# Patient Record
Sex: MALE | Race: WHITE
[De-identification: names, ages, dates, MRNs, and addresses within clinical notes are randomized per-mention and may not be internally consistent; named-entity substitution may affect disease eponyms.]

---

## 2017-03-17 ENCOUNTER — HOSPITAL ENCOUNTER (EMERGENCY)
Dept: HOSPITAL 75 - ER | Age: 37
Discharge: HOME | End: 2017-03-17
Payer: MEDICARE

## 2017-03-17 VITALS — SYSTOLIC BLOOD PRESSURE: 112 MMHG | DIASTOLIC BLOOD PRESSURE: 69 MMHG

## 2017-03-17 VITALS — HEIGHT: 72 IN | BODY MASS INDEX: 33.05 KG/M2 | WEIGHT: 244 LBS

## 2017-03-17 DIAGNOSIS — R59.0: ICD-10-CM

## 2017-03-17 DIAGNOSIS — R10.31: Primary | ICD-10-CM

## 2017-03-17 DIAGNOSIS — K76.0: ICD-10-CM

## 2017-03-17 DIAGNOSIS — Z87.442: ICD-10-CM

## 2017-03-17 LAB
BASOPHILS # BLD AUTO: 0.1 10^3/UL (ref 0–0.1)
BASOPHILS NFR BLD AUTO: 1 % (ref 0–10)
BILIRUB UR QL STRIP: NEGATIVE
EOSINOPHIL # BLD AUTO: 0.2 10^3/UL (ref 0–0.3)
EOSINOPHIL NFR BLD AUTO: 4 % (ref 0–10)
ERYTHROCYTE [DISTWIDTH] IN BLOOD BY AUTOMATED COUNT: 12.2 % (ref 10–14.5)
KETONES UR QL STRIP: NEGATIVE
LEUKOCYTE ESTERASE UR QL STRIP: (no result)
LYMPHOCYTES # BLD AUTO: 1.6 X 10^3 (ref 1–4)
LYMPHOCYTES NFR BLD AUTO: 26 % (ref 12–44)
MCH RBC QN AUTO: 31 PG (ref 25–34)
MCHC RBC AUTO-ENTMCNC: 34 G/DL (ref 32–36)
MCV RBC AUTO: 92 FL (ref 80–99)
MONOCYTES # BLD AUTO: 0.5 X 10^3 (ref 0–1)
MONOCYTES NFR BLD AUTO: 9 % (ref 0–12)
NEUTROPHILS # BLD AUTO: 3.7 X 10^3 (ref 1.8–7.8)
NEUTROPHILS NFR BLD AUTO: 61 % (ref 42–75)
NITRITE UR QL STRIP: NEGATIVE
PH UR STRIP: 7 [PH] (ref 5–9)
PLATELET # BLD: 277 10^3/UL (ref 130–400)
PMV BLD AUTO: 9.2 FL (ref 7.4–10.4)
PROT UR QL STRIP: NEGATIVE
RBC # BLD AUTO: 5.09 10^6/UL (ref 4.35–5.85)
SP GR UR STRIP: 1.01 (ref 1.02–1.02)
UROBILINOGEN UR-MCNC: NORMAL MG/DL
WBC # BLD AUTO: 6 10^3/UL (ref 4.3–11)
WBC #/AREA URNS HPF: (no result) /HPF

## 2017-03-17 PROCEDURE — 96374 THER/PROPH/DIAG INJ IV PUSH: CPT

## 2017-03-17 PROCEDURE — 74176 CT ABD & PELVIS W/O CONTRAST: CPT

## 2017-03-17 PROCEDURE — 96375 TX/PRO/DX INJ NEW DRUG ADDON: CPT

## 2017-03-17 PROCEDURE — 36415 COLL VENOUS BLD VENIPUNCTURE: CPT

## 2017-03-17 PROCEDURE — 85025 COMPLETE CBC W/AUTO DIFF WBC: CPT

## 2017-03-17 PROCEDURE — 81000 URINALYSIS NONAUTO W/SCOPE: CPT

## 2017-03-17 NOTE — DIAGNOSTIC IMAGING REPORT
PROCEDURE: CT urinary tract, rule out kidney stone.



TECHNIQUE: Multiple contiguous axial images were obtained

through the abdomen and pelvis without the use of intravenous

contrast.



INDICATION: Right flank pain x2 days.



COMPARISON: 10/28/2011



FINDINGS: Included views the lung bases show mild dependent

atelectasis, but are otherwise unremarkable.



CT ABDOMEN:

No renal or ureteral calculi are seen on either side.

Additionally, there is no hydroureteronephrosis or other

evidence of obstruction. No focal renal mass-type lesions are

identified on this noncontrast exam.



The liver is diffusely hypodense. Otherwise, the liver, spleen,

pancreas and adrenal glands have an unremarkable noncontrast CT

appearance, as well. Small bowel loops are nondistended. Normal

appendix is identified. There is no loculated fluid collection,

free fluid or free air within the abdomen. No abnormal

mesenteric or retroperitoneal adenopathy is seen. There is mild

scattered calcified aortic atherosclerosis. Bony structures show

no acute abnormalities.



CT PELVIS:

There is a hazy nodular density within the anterior pelvis just

to the left lateral of midline that measures approximately 1.5 x

1.3 x 1 cm (image 125, series 2 and image 39, series 4).



Prominent right inguinal lymph node measures 2.6 x 1.6 cm. This

is stable compared to 2.7 x 1.8 cm on prior exam. Previously

described second large inguinal lymph node is now much smaller

and measures approximately 5 mm in diameter, in comparison to

2.5 cm previously. No other abnormal adenopathy is seen.



Urinary bladder is unopacified. No calculi are seen within the

urinary bladder. There is no loculated fluid collection, free

fluid or free air. Bony structures show no acute abnormalities.



IMPRESSION:

1. No acute abnormalities in the abdomen or pelvis.

2. Hepatic steatosis.

3. Nodular subtle density in the lower pelvis to the left

lateral of midline adjacent to the sigmoid colon. This is

nonspecific, but may be on the basis of prior omental infarct.

4. Right inguinal adenopathy, as described above.



Dictated by:



Dictated on workstation # [id number]

## 2017-03-17 NOTE — ED GU-MALE
General


Chief Complaint:  -Male


Stated Complaint:  BLOOD IN URINE RIGHT SIDE PAIN FEVER


Nursing Triage Note:  


Pt reports fever and chills since yesterday. Pt reports r sided abdominal pain 


and blood in his urine. Pt states he saw Dr. Sandoval yesterday and was told 

it 


was viral and prescribed zofran.


Source:  patient





History of Present Illness


Time seen by provider:  10:55


Initial Comments


The patient is a 36-year-old white male who presents with a complaint of right 

sided abdominal and flank pain.  He reports that he saw his provider Dr. Sandoval yesterday and was told that this was a viral illness.  He reports 

that when he awakened this morning he felt much worse and when he urinated 

there was red blood.  He has a past history of kidney stones.  He also now has 

some nausea and has vomited.


Timing/Duration:  yesterday


Severity/Quality:  moderate


Location:  right flank, other (right hemiabdomen)


Activities at Onset:  none


Associated Symptoms:   fever/chills nausea/vomiting





Allergies and Home Medications


Allergies


Coded Allergies:  


     Aspirin (Unverified  Allergy, Mild, 11/10/07)


 PT STATES TROUBLE BREATHING AND SWOLLEN GLANDS





Constitutional:   see HPI


EENTM:   no symptoms reported


Respiratory:   no symptoms reported


Cardiovascular:   no symptoms reported


Gastrointestinal:   see HPI


Genitourinary:   see HPI flank pain


Musculoskeletal:   no symptoms reported


Skin:   no symptoms reported


Psychiatric/Neurological:   No Symptoms Reported


Endocrine:   No Symptoms Reported


Hematologic/Lymphatic:   No Symptoms Reported





Past Medical-Social-Family Hx


Patient Social History


Alcohol Use:  Occasionally Uses


Recreational Drug Use:  No


Smoking Status:  Never a Smoker


Recent Foreign Travel:  No


Contact w/Someone Who Travel:  No


Recent Infectious Disease Expo:  No


Recent Hopitalizations:  No





Surgeries


HX Surgeries:  Yes


Surgeries:  Vasectomy





Respiratory


Hx Respiratory Disorders:  No





Cardiovascular


Hx Cardiac Disorders:  No





Neurological


Hx Neurological Disorders:  No





Reproductive System


Hx Reproductive Disorders:  No





Genitourinary


Hx Genitourinary Disorders:  Yes


Genitourinary Disorders:  Kidney Stones





Gastrointestinal


Hx Gastrointestinal Disorders:  No





Musculoskeletal


Hx Musculoskeletal Disorders:  Yes





Endocrine


Hx Endocrine Disorders:  Yes (hypoglycemia)





HEENT


HX ENT Disorders:  No





Cancer


Hx Cancer:  No





Psychosocial


Hx Psychiatric Problems:  No





Blood Transfusions


Hx Blood Disorders:  No





Physical Exam


Vital Signs





 Vital Sign - Last 12Hours








 3/17/17





 09:21


 


Temp 98.7


 


Pulse 68


 


Resp 18


 


B/P 138/97


 


Pulse Ox 98





Capillary Refill : Less Than 3 Seconds


General Appearance:   mild distress


HEENT:   normal ENT inspection


Neck:   full range of motion


Cardiovascular:   normal peripheral pulses regular rate, rhythm no edema no 

gallop no JVD no murmur


Respiratory:   chest non-tender lungs clear normal breath sounds no respiratory 

distress no accessory muscle use


Gastrointestinal:   normal bowel sounds non tender soft no organomegaly no 

pulsatile mass


Back:   CVA tenderness (R)


Extremities:   normal range of motion non-tender normal inspection no pedal 

edema no calf tenderness normal capillary refill pelvis stable


Neurologic/Psychiatric:   CNs II-XII nml as tested no motor/sensory deficits 

alert normal mood/affect oriented x 3


Skin:   normal color warm/dry


Lymphatic:  No no adenopathy, No axilla node tender (R), No axilla node tender (

L), No inguinal node tender (R), No inguinal node tender (L), No other





Progress/Results/Core Measures


Results/Orders


Lab Results





 Laboratory Tests








Test


  3/17/17


09:23 3/17/17


09:31 Range/Units


 


 


Urine Bacteria NEGATIVE    /HPF


 


Urine Bilirubin NEGATIVE   NEGATIVE  


 


Urine Casts NONE    /LPF


 


Urine Clarity CLEAR    


 


Urine Color YELLOW    


 


Urine Crystals NONE    /LPF


 


Urine Culture Indicated NO    


 


Urine Glucose (UA) NEGATIVE   NEGATIVE  


 


Urine Ketones NEGATIVE   NEGATIVE  


 


Urine Leukocyte Esterase 1+ H  NEGATIVE  


 


Urine Mucus SMALL H   /LPF


 


Urine Nitrite NEGATIVE   NEGATIVE  


 


Urine Protein NEGATIVE   NEGATIVE  


 


Urine RBC NONE    /HPF


 


Urine RBC (Auto) NEGATIVE   NEGATIVE  


 


Urine Specific Gravity 1.010 L  1.016-1.022  


 


Urine Squamous Epithelial


Cells  


  


   /HPF


 


 


Urine Urobilinogen NORMAL   NORMAL  MG/DL


 


Urine WBC RARE    /HPF


 


Urine pH 7   5-9  


 


Basophils # (Auto)


  


  0.1 


  0.0-0.1


10^3/uL


 


Basophils (%) (Auto)  1  0-10  %


 


Eosinophils # (Auto)


  


  0.2 


  0.0-0.3


10^3/uL


 


Eosinophils (%) (Auto)  4  0-10  %


 


Hematocrit  47  40-54  %


 


Hemoglobin  15.8  13.3-17.7  G/DL


 


Lymphocytes # (Auto)  1.6  1.0-4.0  X 10^3


 


Lymphocytes (%) (Auto)  26  12-44  %


 


Mean Corpuscular Hemoglobin  31  25-34  PG


 


Mean Corpuscular Hemoglobin


Concent 


  34 


  32-36  G/DL


 


 


Mean Corpuscular Volume  92  80-99  FL


 


Mean Platelet Volume  9.2  7.4-10.4  FL


 


Monocytes # (Auto)  0.5  0.0-1.0  X 10^3


 


Monocytes (%) (Auto)  9  0-12  %


 


Neutrophils # (Auto)  3.7  1.8-7.8  X 10^3


 


Neutrophils (%) (Auto)  61  42-75  %


 


Platelet Count


  


  277 


  130-400


10^3/uL


 


Red Blood Count


  


  5.09 


  4.35-5.85


10^6/uL


 


Red Cell Distribution Width  12.2  10.0-14.5  %


 


White Blood Count


  


  6.0 


  4.3-11.0


10^3/uL








My Orders





 Orders-EUNICE SINGER MD


Cbc With Automated Diff (3/17/17 09:17)


Ua Culture If Indicated (3/17/17 09:17)


Ketorolac Injection (Toradol Injection) (3/17/17 10:45)


Ondansetron Injection (Zofran Injectio (3/17/17 10:45)


Ct Abd/Pelvis Wo(Kidney Stone) (3/17/17 10:41)





Medications Given in ED








 Current Medications








 Medications  Dose


 Ordered  Sig/Duy


 Route  Start Time


 Stop Time Status Last Admin


Dose Admin


 


 Ketorolac


 Tromethamine  30 mg  ONCE  ONCE


 IVP  3/17/17 10:45


 3/17/17 10:46 DC 3/17/17 10:49


30 MG


 


 Ondansetron HCl  8 mg  ONCE  ONCE


 IVP  3/17/17 10:45


 3/17/17 10:46 DC 3/17/17 10:49


8 MG











Vital Signs/I&O





 Vital Sign - Last 12Hours








 3/17/17





 09:21


 


Temp 98.7


 


Pulse 68


 


Resp 18


 


B/P 138/97


 


Pulse Ox 98








Blood Pressure Mean:  111





Departure


Impression


Impression:  


 Primary Impression:  


 Right sided abdominal pain


Disposition:  01 HOME, SELF-CARE


Condition:  Stable/Unchanged


Decision to Admit Reason:  Admit from ER (General)


Decision to Admit/Date:  Mar 17, 2017


Time/Decision to Admit Time:  12:20





Departure-Patient Inst.


Referrals:  


NO,LOCAL PHYSICIAN (PCP/Family)


Primary Care Physician





Add. Discharge Instructions:


All discharge instructions reviewed with patient and/or family. Voiced 

understanding.


Clear liquid diet.


Lortab as prescribed


See your provider if problems


Return to ER if change in condition


Scripts


Hydrocodone/Acetaminophen (Lortab  mg Tablet)1 Each Tablet1 Each PO 4 

TIMES A DAY #20 TAB


   Prov:EUNICE SINGER MD         3/17/17








EUNICE SINGER MD Mar 17, 2017 10:56

## 2017-05-06 ENCOUNTER — HOSPITAL ENCOUNTER (EMERGENCY)
Dept: HOSPITAL 75 - ER | Age: 37
Discharge: HOME | End: 2017-05-06
Payer: MEDICARE

## 2017-05-06 VITALS — DIASTOLIC BLOOD PRESSURE: 81 MMHG | SYSTOLIC BLOOD PRESSURE: 142 MMHG

## 2017-05-06 VITALS — WEIGHT: 242 LBS | HEIGHT: 72 IN | BODY MASS INDEX: 32.78 KG/M2

## 2017-05-06 DIAGNOSIS — L03.116: Primary | ICD-10-CM

## 2017-05-06 LAB
ALBUMIN SERPL-MCNC: 4.3 G/DL (ref 3.2–4.5)
ALT SERPL-CCNC: 27 U/L (ref 0–55)
ANION GAP SERPL CALC-SCNC: 9 MMOL/L (ref 5–14)
AST SERPL-CCNC: 18 U/L (ref 5–34)
BASOPHILS # BLD AUTO: 0 10^3/UL (ref 0–0.1)
BASOPHILS NFR BLD AUTO: 1 % (ref 0–10)
BILIRUB SERPL-MCNC: 0.4 MG/DL (ref 0.1–1)
BILIRUB UR QL STRIP: NEGATIVE
BUN SERPL-MCNC: 7 MG/DL (ref 7–18)
BUN/CREAT SERPL: 5
CALCIUM SERPL-MCNC: 9.1 MG/DL (ref 8.5–10.1)
CHLORIDE SERPL-SCNC: 105 MMOL/L (ref 98–107)
CK SERPL-CCNC: 309 U/L (ref 30–200)
CO2 SERPL-SCNC: 27 MMOL/L (ref 21–32)
CREAT SERPL-MCNC: 1.29 MG/DL (ref 0.6–1.3)
EOSINOPHIL # BLD AUTO: 0.1 10^3/UL (ref 0–0.3)
EOSINOPHIL NFR BLD AUTO: 3 % (ref 0–10)
ERYTHROCYTE [DISTWIDTH] IN BLOOD BY AUTOMATED COUNT: 11.9 % (ref 10–14.5)
GFR SERPLBLD BASED ON 1.73 SQ M-ARVRAT: > 60 ML/MIN
GLUCOSE SERPL-MCNC: 82 MG/DL (ref 70–105)
KETONES UR QL STRIP: NEGATIVE
LEUKOCYTE ESTERASE UR QL STRIP: (no result)
LYMPHOCYTES # BLD AUTO: 1.4 X 10^3 (ref 1–4)
LYMPHOCYTES NFR BLD AUTO: 27 % (ref 12–44)
MCH RBC QN AUTO: 31 PG (ref 25–34)
MCHC RBC AUTO-ENTMCNC: 34 G/DL (ref 32–36)
MCV RBC AUTO: 92 FL (ref 80–99)
MONOCYTES # BLD AUTO: 0.5 X 10^3 (ref 0–1)
MONOCYTES NFR BLD AUTO: 10 % (ref 0–12)
NEUTROPHILS # BLD AUTO: 3 X 10^3 (ref 1.8–7.8)
NEUTROPHILS NFR BLD AUTO: 60 % (ref 42–75)
NITRITE UR QL STRIP: NEGATIVE
PH UR STRIP: 6 [PH] (ref 5–9)
PLATELET # BLD: 256 10^3/UL (ref 130–400)
PMV BLD AUTO: 8.8 FL (ref 7.4–10.4)
POTASSIUM SERPL-SCNC: 3.8 MMOL/L (ref 3.6–5)
PROT SERPL-MCNC: 6.7 G/DL (ref 6.4–8.2)
PROT UR QL STRIP: NEGATIVE
RBC # BLD AUTO: 4.64 10^6/UL (ref 4.35–5.85)
SODIUM SERPL-SCNC: 141 MMOL/L (ref 135–145)
SP GR UR STRIP: 1.01 (ref 1.02–1.02)
SQUAMOUS #/AREA URNS HPF: (no result) /HPF
UROBILINOGEN UR-MCNC: NORMAL MG/DL
WBC # BLD AUTO: 5 10^3/UL (ref 4.3–11)
WBC #/AREA URNS HPF: (no result) /HPF

## 2017-05-06 PROCEDURE — 85025 COMPLETE CBC W/AUTO DIFF WBC: CPT

## 2017-05-06 PROCEDURE — 80053 COMPREHEN METABOLIC PANEL: CPT

## 2017-05-06 PROCEDURE — 81000 URINALYSIS NONAUTO W/SCOPE: CPT

## 2017-05-06 PROCEDURE — 36415 COLL VENOUS BLD VENIPUNCTURE: CPT

## 2017-05-06 PROCEDURE — 82550 ASSAY OF CK (CPK): CPT

## 2017-05-06 NOTE — ED LOWER EXTREMITY
General


Chief Complaint:  Lower Extremity


Stated Complaint:  WEAKNESS, DIZZY, SUDDEN BODYACHES. SPIDER BITE


Nursing Triage Note:  


ARRIVED VIA AMB WITH KNOWN SPIDER BITE ON LEFT LOWER LEG.  WAS SEEN AT [facility] 


AND REALEASED TO GO BACK TO WORK.  AT WORK BECAME DIZZY, PAIN IN LEG, AND NOT 


FEELING WELL.


Nursing Sepsis Screen:  No Definite Risk


Source:  patient


Exam Limitations:  no limitations





History of Present Illness


Time seen by provider:  12:55


Initial Comments


To ER with a suspected insect bite to the left lateral leg.  This began a few 

days ago he was seen at [facility] and given an antibiotic which included 

Flagyl and Levaquin.  Patient states he was released to go back to work today 

and while at work became dizzy and lightheaded and believes this to be 

secondary to the insect bite on his leg.


Onset:  last week


Severity:  moderate


Pain/Injury Location:  left leg


Method of Injury:  unknown





Allergies and Home Medications


Allergies


Coded Allergies:  


     No Known Drug Allergies (Unverified , 5/6/17)





Home Medications


Levofloxacin 750 Mg Tablet, #10 (Reported)


Metronidazole 500 Mg Tablet, #21 (Reported)


Mupirocin 22 Gm Oint...g., 22 GM TP BID, #1


   Prescribed by: BRYAN AGUILAR on 5/6/17 1258


Tramadol HCl 50 Mg Tablet, #40 (Reported)





Constitutional:  see HPI


EENTM:  see HPI


Respiratory:  no symptoms reported


Cardiovascular:  no symptoms reported


Genitourinary:  no symptoms reported


Musculoskeletal:  no symptoms reported


Skin:  no symptoms reported


Psychiatric/Neurological:  No Symptoms Reported





Past Medical-Social-Family Hx


Patient Social History


Alcohol Use:  Denies Use


Recreational Drug Use:  No


Smoking Status:  Never a Smoker


Recent Foreign Travel:  No


Contact w/Someone Who Travel:  No


Recent Infectious Disease Expo:  No


Recent Hopitalizations:  No





Surgeries


HX Surgeries:  Yes (KIDNEY STONES, )


Surgeries:  Tonsillectomy, Vasectomy





Respiratory


Hx Respiratory Disorders:  No





Cardiovascular


Hx Cardiac Disorders:  No





Neurological


Hx Neurological Disorders:  No





Reproductive System


Hx Reproductive Disorders:  No





Genitourinary


Hx Genitourinary Disorders:  Yes


Genitourinary Disorders:  Kidney Stones





Gastrointestinal


Hx Gastrointestinal Disorders:  No





Musculoskeletal


Hx Musculoskeletal Disorders:  Yes





Endocrine


Hx Endocrine Disorders:  Yes (hypoglycemia)





HEENT


HX ENT Disorders:  No





Cancer


Hx Cancer:  No





Psychosocial


Hx Psychiatric Problems:  No





Blood Transfusions


Hx Blood Disorders:  No





Physical Exam


Vital Signs





Vital Sign - Last 12Hours








 5/6/17





 12:30


 


Temp 97.5


 


Pulse 65


 


Resp 16


 


B/P (MAP) 143/94


 


Pulse Ox 93





Capillary Refill : Less Than 3 Seconds


General Appearance:  WD/WN, no apparent distress


HEENT:  PERRL/EOMI, normal ENT inspection


Neck:  non-tender, full range of motion


Respiratory:  no respiratory distress, no accessory muscle use


Gastrointestinal:  normal bowel sounds, non tender, soft


Hips:  bilateral hip non-tender, bilateral hip normal inspection, bilateral hip 

normal range of motion


Legs:  bilateral leg non-tender, bilateral leg normal inspection, bilateral leg 

normal range of motion, left leg other (there is a small area of erythema to 

the lateral left leg without surrounding cellulitis, lymphangitis.  There is no 

fluctuance to this, there is however some induration.)


Knees:  bilateral knee non-tender, bilateral knee normal inspection, bilateral 

knee normal range of motion


Ankles:  bilateral ankle non-tender, bilateral ankle normal inspection


Feet:  bilateral foot non-tender, bilateral foot normal inspection, bilateral 

foot normal range of motion





Progress/Results/Core Measures


Results/Orders


Lab Results





Laboratory Tests








Test


  5/6/17


12:52 5/6/17


13:04 Range/Units


 


 


White Blood Count


  5.0 


  


  4.3-11.0


10^3/uL


 


Red Blood Count


  4.64 


  


  4.35-5.85


10^6/uL


 


Hemoglobin 14.3   13.3-17.7  G/DL


 


Hematocrit 43   40-54  %


 


Mean Corpuscular Volume 92   80-99  FL


 


Mean Corpuscular Hemoglobin 31   25-34  PG


 


Mean Corpuscular Hemoglobin


Concent 34 


  


  32-36  G/DL


 


 


Red Cell Distribution Width 11.9   10.0-14.5  %


 


Platelet Count


  256 


  


  130-400


10^3/uL


 


Mean Platelet Volume 8.8   7.4-10.4  FL


 


Neutrophils (%) (Auto) 60   42-75  %


 


Lymphocytes (%) (Auto) 27   12-44  %


 


Monocytes (%) (Auto) 10   0-12  %


 


Eosinophils (%) (Auto) 3   0-10  %


 


Basophils (%) (Auto) 1   0-10  %


 


Neutrophils # (Auto) 3.0   1.8-7.8  X 10^3


 


Lymphocytes # (Auto) 1.4   1.0-4.0  X 10^3


 


Monocytes # (Auto) 0.5   0.0-1.0  X 10^3


 


Eosinophils # (Auto)


  0.1 


  


  0.0-0.3


10^3/uL


 


Basophils # (Auto)


  0.0 


  


  0.0-0.1


10^3/uL


 


Sodium Level 141   135-145  MMOL/L


 


Potassium Level 3.8   3.6-5.0  MMOL/L


 


Chloride Level 105     MMOL/L


 


Carbon Dioxide Level 27   21-32  MMOL/L


 


Anion Gap 9   5-14  MMOL/L


 


Blood Urea Nitrogen 7   7-18  MG/DL


 


Creatinine


  1.29 


  


  0.60-1.30


MG/DL


 


Estimat Glomerular Filtration


Rate > 60 


  


   


 


 


BUN/Creatinine Ratio 5    


 


Glucose Level 82     MG/DL


 


Calcium Level 9.1   8.5-10.1  MG/DL


 


Total Bilirubin 0.4   0.1-1.0  MG/DL


 


Aspartate Amino Transf


(AST/SGOT) 18 


  


  5-34  U/L


 


 


Alanine Aminotransferase


(ALT/SGPT) 27 


  


  0-55  U/L


 


 


Alkaline Phosphatase 73     U/L


 


Total Creatine Kinase 309 H    U/L


 


Total Protein 6.7   6.4-8.2  G/DL


 


Albumin 4.3   3.2-4.5  G/DL








My Orders





Orders - BRYAN AGUILAR


Cbc With Automated Diff (5/6/17 12:44)


Comprehensive Metabolic Panel (5/6/17 12:44)


Ua Culture If Indicated (5/6/17 12:44)


Creatine Kinase (5/6/17 12:44)





Vital Signs/I&O





Vital Sign - Last 12Hours








 5/6/17





 12:30


 


Temp 97.5


 


Pulse 65


 


Resp 16


 


B/P (MAP) 143/94


 


Pulse Ox 93














Blood Pressure Mean:  110











Departure


Impression


Impression:  


 Primary Impression:  


 Soft tissue infection


Disposition:  01 HOME, SELF-CARE


Condition:  Stable





Departure-Patient Inst.


Decision time for Depature:  12:57


Referrals:  


NO,LOCAL PHYSICIAN (PCP/Family)


Primary Care Physician


Patient Instructions:  NO INSTRUCTIONS GIVEN





Add. Discharge Instructions:  


1.  Stop the antibiotic pills and start the antibiotic ointment


2.  Follow-up with your doctor next week


3.  Drink plenty of fluids


All discharge instructions reviewed with patient and/or family. Voiced 

understanding.


Scripts


Mupirocin (Mupirocin) 22 Gm Oint...g.


22 GM TP BID, #1 TUBE


   Prov: BRYAN AGUILAR         5/6/17


Work/School Note:  Work Release Form   Date Seen in the Emergency Department:  

May 6, 2017


   Return to Work:  May 7, 2017











BRYAN AGUILAR May 6, 2017 12:59

## 2020-08-24 ENCOUNTER — HOSPITAL ENCOUNTER (EMERGENCY)
Dept: HOSPITAL 75 - ER | Age: 40
Discharge: HOME | End: 2020-08-24
Payer: MEDICARE

## 2020-08-24 VITALS — HEIGHT: 71.65 IN | BODY MASS INDEX: 32.91 KG/M2 | WEIGHT: 240.3 LBS

## 2020-08-24 VITALS — SYSTOLIC BLOOD PRESSURE: 126 MMHG | DIASTOLIC BLOOD PRESSURE: 83 MMHG
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
## 2020-08-24 DIAGNOSIS — E87.6: ICD-10-CM

## 2020-08-24 DIAGNOSIS — S90.32XA: Primary | ICD-10-CM

## 2020-08-24 LAB
ALBUMIN SERPL-MCNC: 4 GM/DL (ref 3.2–4.5)
ALP SERPL-CCNC: 93 U/L (ref 40–136)
ALT SERPL-CCNC: 45 U/L (ref 0–55)
APTT BLD: 29 SEC (ref 24–35)
BASOPHILS # BLD AUTO: 0.1 10^3/UL (ref 0–0.1)
BASOPHILS NFR BLD AUTO: 1 % (ref 0–10)
BILIRUB SERPL-MCNC: 0.3 MG/DL (ref 0.1–1)
BUN/CREAT SERPL: 7
CALCIUM SERPL-MCNC: 8.8 MG/DL (ref 8.5–10.1)
CHLORIDE SERPL-SCNC: 104 MMOL/L (ref 98–107)
CO2 SERPL-SCNC: 27 MMOL/L (ref 21–32)
CREAT SERPL-MCNC: 1 MG/DL (ref 0.6–1.3)
D DIMER PPP FEU-MCNC: 0.66 UG/ML (ref 0–0.49)
EOSINOPHIL # BLD AUTO: 0.4 10^3/UL (ref 0–0.3)
EOSINOPHIL NFR BLD AUTO: 5 % (ref 0–10)
ERYTHROCYTE [DISTWIDTH] IN BLOOD BY AUTOMATED COUNT: 13.3 % (ref 10–14.5)
GFR SERPLBLD BASED ON 1.73 SQ M-ARVRAT: > 60 ML/MIN
GLUCOSE SERPL-MCNC: 129 MG/DL (ref 70–105)
HCT VFR BLD CALC: 45 % (ref 40–54)
HGB BLD-MCNC: 14.9 G/DL (ref 13.3–17.7)
INR PPP: 1 (ref 0.8–1.4)
LYMPHOCYTES # BLD AUTO: 1.4 X 10^3 (ref 1–4)
LYMPHOCYTES NFR BLD AUTO: 19 % (ref 12–44)
MANUAL DIFFERENTIAL PERFORMED BLD QL: NO
MCH RBC QN AUTO: 31 PG (ref 25–34)
MCHC RBC AUTO-ENTMCNC: 33 G/DL (ref 32–36)
MCV RBC AUTO: 92 FL (ref 80–99)
MONOCYTES # BLD AUTO: 0.4 X 10^3 (ref 0–1)
MONOCYTES NFR BLD AUTO: 6 % (ref 0–12)
NEUTROPHILS # BLD AUTO: 5 X 10^3 (ref 1.8–7.8)
NEUTROPHILS NFR BLD AUTO: 69 % (ref 42–75)
PLATELET # BLD: 317 10^3/UL (ref 130–400)
PMV BLD AUTO: 8.7 FL (ref 7.4–10.4)
POTASSIUM SERPL-SCNC: 2.9 MMOL/L (ref 3.6–5)
PROT SERPL-MCNC: 7 GM/DL (ref 6.4–8.2)
PROTHROMBIN TIME: 13.1 SEC (ref 12.2–14.7)
SODIUM SERPL-SCNC: 141 MMOL/L (ref 135–145)
WBC # BLD AUTO: 7.1 10^3/UL (ref 4.3–11)

## 2020-08-24 PROCEDURE — 85730 THROMBOPLASTIN TIME PARTIAL: CPT

## 2020-08-24 PROCEDURE — 85379 FIBRIN DEGRADATION QUANT: CPT

## 2020-08-24 PROCEDURE — 36415 COLL VENOUS BLD VENIPUNCTURE: CPT

## 2020-08-24 PROCEDURE — 99283 EMERGENCY DEPT VISIT LOW MDM: CPT

## 2020-08-24 PROCEDURE — 80053 COMPREHEN METABOLIC PANEL: CPT

## 2020-08-24 PROCEDURE — 85025 COMPLETE CBC W/AUTO DIFF WBC: CPT

## 2020-08-24 PROCEDURE — 85610 PROTHROMBIN TIME: CPT

## 2020-08-24 NOTE — ED LOWER EXTREMITY
General


Chief Complaint:  Lower Extremity


Stated Complaint:  time, unlikely. All


Nursing Triage Note:  


Pt here with bruising to his posterior left thigh. Pt states he woke up with the




bruising and denies any trauma or new activity. Pt reports pain 10/10.


Nursing Sepsis Screen:  No Definite Risk


Source:  patient


Exam Limitations:  no limitations





History of Present Illness


Date Seen by Provider:  Aug 24, 2020


Time Seen by Provider:  17:53


Initial Comments


To ER with bruising to the posterior left upper leg. He awakened with this this 

morning. He does report chills but no fever. Concerned he might have had a blood

clot.


Onset:  this morning


Severity:  moderate


Pain/Injury Location:  left leg


Method of Injury:  unknown


Modifying Factors:  Worse With Movement





Allergies and Home Medications


Allergies


Coded Allergies:  


     No Known Drug Allergies (Unverified , 5/6/17)





Home Medications


Mupirocin 22 Gm Oint...g., 22 GM TP BID


   Prescribed by: BRYAN AGUILAR on 5/6/17 1258





Patient Home Medication List


Home Medication List Reviewed:  Yes





Review of Systems


Constitutional:  see HPI


EENTM:  see HPI


Respiratory:  no symptoms reported


Cardiovascular:  no symptoms reported


Genitourinary:  no symptoms reported


Musculoskeletal:  see HPI


Skin:  see HPI





Past Medical-Social-Family Hx


Patient Social History


Recent Foreign Travel:  No


Contact w/Someone Who Travel:  No


Recent Infectious Disease Expo:  No


Recent Hopitalizations:  No





Past Medical History


Tonsillectomy, Vasectomy


Reproductive Disorders:  No


Kidney Stones





Physical Exam


Vital Signs





Vital Signs - First Documented








 8/24/20





 17:30


 


Temp 36.7


 


Pulse 68


 


Resp 18


 


B/P (MAP) 149/102 (118)


 


Pulse Ox 98


 


O2 Delivery Room Air





Capillary Refill : Less Than 3 Seconds


Height, Weight, BMI


Height: 6'"


Weight: 242lbs. oz. 109.245190pu; 32.00 BMI


Method:Stated


General Appearance:  WD/WN, no apparent distress


HEENT:  PERRL/EOMI, normal ENT inspection


Respiratory:  no respiratory distress, no accessory muscle use


Hips:  bilateral hip non-tender, bilateral hip normal inspection, bilateral hip 

normal range of motion


Legs:  bilateral leg non-tender, bilateral leg normal inspection, bilateral leg 

normal range of motion; left leg other (there is some palm size dark purplish 

bruising just above the knee posteriorly on the left. There is no erythema or 

swelling of either extremity. He does report some tenderness to the left 

inguinal region. No wounds or sores on his leg.)


Knees:  bilateral knee non-tender, bilateral knee normal inspection


Ankles:  bilateral ankle non-tender, bilateral ankle normal inspection


Feet:  bilateral foot non-tender, bilateral foot normal inspection, bilateral 

foot normal range of motion; left foot other (equal posterior tibial and 

dorsalis pulses strength 2+ bilaterally)


Neurologic/Psychiatric:  alert, normal mood/affect, oriented x 3


Skin:  normal color, warm/dry





Progress/Results/Core Measures


Results/Orders


Lab Results





Laboratory Tests








Test


 8/24/20


18:13 Range/Units


 


 


White Blood Count


 7.1 


 4.3-11.0


10^3/uL


 


Red Blood Count


 4.87 


 4.35-5.85


10^6/uL


 


Hemoglobin 14.9  13.3-17.7  G/DL


 


Hematocrit 45  40-54  %


 


Mean Corpuscular Volume 92  80-99  FL


 


Mean Corpuscular Hemoglobin 31  25-34  PG


 


Mean Corpuscular Hemoglobin


Concent 33 


 32-36  G/DL





 


Red Cell Distribution Width 13.3  10.0-14.5  %


 


Platelet Count


 317 


 130-400


10^3/uL


 


Mean Platelet Volume 8.7  7.4-10.4  FL


 


Neutrophils (%) (Auto) 69  42-75  %


 


Lymphocytes (%) (Auto) 19  12-44  %


 


Monocytes (%) (Auto) 6  0-12  %


 


Eosinophils (%) (Auto) 5  0-10  %


 


Basophils (%) (Auto) 1  0-10  %


 


Neutrophils # (Auto) 5.0  1.8-7.8  X 10^3


 


Lymphocytes # (Auto) 1.4  1.0-4.0  X 10^3


 


Monocytes # (Auto) 0.4  0.0-1.0  X 10^3


 


Eosinophils # (Auto)


 0.4 H


 0.0-0.3


10^3/uL


 


Basophils # (Auto)


 0.1 


 0.0-0.1


10^3/uL


 


Prothrombin Time 13.1  12.2-14.7  SEC


 


INR Comment 1.0  0.8-1.4  


 


Activated Partial


Thromboplast Time 29 


 24-35  SEC





 


D-Dimer


 0.66 H


 0.00-0.49


UG/ML


 


Sodium Level 141  135-145  MMOL/L


 


Potassium Level 2.9 L 3.6-5.0  MMOL/L


 


Chloride Level 104    MMOL/L


 


Carbon Dioxide Level 27  21-32  MMOL/L


 


Anion Gap 10  5-14  MMOL/L


 


Blood Urea Nitrogen 7  7-18  MG/DL


 


Creatinine


 1.00 


 0.60-1.30


MG/DL


 


Estimat Glomerular Filtration


Rate > 60 


  





 


BUN/Creatinine Ratio 7   


 


Glucose Level 129 H   MG/DL


 


Calcium Level 8.8  8.5-10.1  MG/DL


 


Corrected Calcium 8.8  8.5-10.1  MG/DL


 


Total Bilirubin 0.3  0.1-1.0  MG/DL


 


Aspartate Amino Transf


(AST/SGOT) 23 


 5-34  U/L





 


Alanine Aminotransferase


(ALT/SGPT) 45 


 0-55  U/L





 


Alkaline Phosphatase 93    U/L


 


Total Protein 7.0  6.4-8.2  GM/DL


 


Albumin 4.0  3.2-4.5  GM/DL








My Orders





Orders - BRYAN AGUILAR APRN


Cbc With Automated Diff (8/24/20 17:51)


Protime With Inr (8/24/20 17:51)


Partial Thromboplastin Time (8/24/20 17:51)


Fibrin Degradation Products (8/24/20 17:51)


Comprehensive Metabolic Panel (8/24/20 17:51)


Ed Iv/Invasive Line Start (8/24/20 17:51)


Hydrocodone/Apap 5/325 Tablet (Lortab 5 (8/24/20 18:00)


Potassium Chloride (Tablet) (Klor Con Ta (8/24/20 19:00)


Enoxaparin Injection (Lovenox Injection) (8/24/20 19:00)





Medications Given in ED





Current Medications








 Medications  Dose


 Ordered  Sig/Duy


 Route  Start Time


 Stop Time Status Last Admin


Dose Admin


 


 Acetaminophen/


 Hydrocodone Bitart  1 tab  ONCE  ONCE


 PO  8/24/20 18:00


 8/24/20 18:01 DC 8/24/20 18:07


1 TAB








Vital Signs/I&O











 8/24/20





 17:30


 


Temp 36.7


 


Pulse 68


 


Resp 18


 


B/P (MAP) 149/102 (118)


 


Pulse Ox 98


 


O2 Delivery Room Air














Blood Pressure Mean:                    118











Departure


Communication (Admissions)


His d-dimer is only 0.66, his bruising appears more of just a simple bruise and 

knot a DVT. If this is truly just and ecchymosis I don't want to put him on 

Lovenox and I'm not convinced enough that this is a DVT to order a prophylactic 

dose of Lovenox while awaiting ultrasound results tomorrow.





Impression





   Primary Impression:  


   Hypokalemia


   Additional Impression:  


   ecchymosis of leg


Disposition:  01 HOME, SELF-CARE


Condition:  Stable





Departure-Patient Inst.


Decision time for Depature:  18:53


Referrals:  


MARVA CHOI MD





NO,LOCAL PHYSICIAN (PCP)


Primary Care Physician


Patient Instructions:  Hypokalemia (DC)





Add. Discharge Instructions:  


1. Call Dr. Choi tomorrow to make an appointment to be seen for follow-up. Call

the phone number for scheduling department to find out what time to show up for 

the ultrasound of your left leg. Pain medication as directed. Return to ER for 

any concerns.


Scripts


Potassium Chloride (Potassium Chloride) 20 Meq Tablet.er


40 MEQ PO BID, #6 TAB


   Prov: BRYAN AGUILAR         8/24/20











BRYAN AGUILAR             Aug 24, 2020 17:55

## 2020-08-25 ENCOUNTER — HOSPITAL ENCOUNTER (OUTPATIENT)
Dept: HOSPITAL 75 - RAD | Age: 40
End: 2020-08-25
Attending: NURSE PRACTITIONER
Payer: MEDICARE

## 2020-08-25 DIAGNOSIS — S80.12XA: Primary | ICD-10-CM

## 2020-08-25 NOTE — DIAGNOSTIC IMAGING REPORT
PROCEDURE: US left lower extremity venous.



TECHNIQUE: Multiple real-time grayscale images were obtained over

the left lower extremity in various projections. Additional

duplex Doppler and color Doppler images were also obtained.



INDICATION: Left leg bruising.



FINDINGS: There is no evidence of left lower extremity DVT. Left

lower extremity deep venous system shows normal compressibility

with normal response to augmentation and Valsalva. No fluid

collection or mass is detected.



IMPRESSION: No evidence of left lower extremity DVT.



Dictated by: 



  Dictated on workstation # [id number]

## 2020-08-31 ENCOUNTER — HOSPITAL ENCOUNTER (EMERGENCY)
Dept: HOSPITAL 75 - ER | Age: 40
Discharge: HOME | End: 2020-08-31
Payer: MEDICARE

## 2020-08-31 VITALS — DIASTOLIC BLOOD PRESSURE: 82 MMHG | SYSTOLIC BLOOD PRESSURE: 141 MMHG

## 2020-08-31 VITALS — HEIGHT: 72.01 IN | BODY MASS INDEX: 33.44 KG/M2 | WEIGHT: 246.92 LBS

## 2020-08-31 DIAGNOSIS — F17.210: ICD-10-CM

## 2020-08-31 DIAGNOSIS — N45.1: Primary | ICD-10-CM

## 2020-08-31 DIAGNOSIS — R10.32: ICD-10-CM

## 2020-08-31 DIAGNOSIS — E66.9: ICD-10-CM

## 2020-08-31 LAB
ALBUMIN SERPL-MCNC: 4.2 GM/DL (ref 3.2–4.5)
ALP SERPL-CCNC: 98 U/L (ref 40–136)
ALT SERPL-CCNC: 46 U/L (ref 0–55)
APTT BLD: 29 SEC (ref 24–35)
APTT PPP: YELLOW S
BACTERIA #/AREA URNS HPF: NEGATIVE /HPF
BASOPHILS # BLD AUTO: 0.1 10^3/UL (ref 0–0.1)
BASOPHILS NFR BLD AUTO: 1 % (ref 0–10)
BILIRUB SERPL-MCNC: 0.3 MG/DL (ref 0.1–1)
BILIRUB UR QL STRIP: NEGATIVE
BUN/CREAT SERPL: 6
CALCIUM SERPL-MCNC: 8.9 MG/DL (ref 8.5–10.1)
CHLORIDE SERPL-SCNC: 106 MMOL/L (ref 98–107)
CO2 SERPL-SCNC: 24 MMOL/L (ref 21–32)
CREAT SERPL-MCNC: 1.09 MG/DL (ref 0.6–1.3)
EOSINOPHIL # BLD AUTO: 0.3 10^3/UL (ref 0–0.3)
EOSINOPHIL NFR BLD AUTO: 4 % (ref 0–10)
ERYTHROCYTE [DISTWIDTH] IN BLOOD BY AUTOMATED COUNT: 13.5 % (ref 10–14.5)
FIBRINOGEN PPP-MCNC: CLEAR MG/DL
GFR SERPLBLD BASED ON 1.73 SQ M-ARVRAT: > 60 ML/MIN
GLUCOSE SERPL-MCNC: 110 MG/DL (ref 70–105)
GLUCOSE UR STRIP-MCNC: NEGATIVE MG/DL
HCT VFR BLD CALC: 47 % (ref 40–54)
HGB BLD-MCNC: 15.7 G/DL (ref 13.3–17.7)
INR PPP: 1 (ref 0.8–1.4)
KETONES UR QL STRIP: NEGATIVE
LEUKOCYTE ESTERASE UR QL STRIP: NEGATIVE
LYMPHOCYTES # BLD AUTO: 1.8 X 10^3 (ref 1–4)
LYMPHOCYTES NFR BLD AUTO: 21 % (ref 12–44)
MANUAL DIFFERENTIAL PERFORMED BLD QL: NO
MCH RBC QN AUTO: 31 PG (ref 25–34)
MCHC RBC AUTO-ENTMCNC: 34 G/DL (ref 32–36)
MCV RBC AUTO: 91 FL (ref 80–99)
MONOCYTES # BLD AUTO: 0.6 X 10^3 (ref 0–1)
MONOCYTES NFR BLD AUTO: 7 % (ref 0–12)
NEUTROPHILS # BLD AUTO: 6.1 X 10^3 (ref 1.8–7.8)
NEUTROPHILS NFR BLD AUTO: 69 % (ref 42–75)
NITRITE UR QL STRIP: NEGATIVE
PH UR STRIP: 6 [PH] (ref 5–9)
PLATELET # BLD: 311 10^3/UL (ref 130–400)
PMV BLD AUTO: 9.2 FL (ref 7.4–10.4)
POTASSIUM SERPL-SCNC: 3.4 MMOL/L (ref 3.6–5)
PROT SERPL-MCNC: 7.2 GM/DL (ref 6.4–8.2)
PROT UR QL STRIP: (no result)
PROTHROMBIN TIME: 13.4 SEC (ref 12.2–14.7)
RBC #/AREA URNS HPF: (no result) /HPF
SODIUM SERPL-SCNC: 140 MMOL/L (ref 135–145)
SP GR UR STRIP: >=1.03 (ref 1.02–1.02)
WBC # BLD AUTO: 8.9 10^3/UL (ref 4.3–11)
WBC #/AREA URNS HPF: (no result) /HPF

## 2020-08-31 PROCEDURE — 76870 US EXAM SCROTUM: CPT

## 2020-08-31 PROCEDURE — 36415 COLL VENOUS BLD VENIPUNCTURE: CPT

## 2020-08-31 PROCEDURE — 81000 URINALYSIS NONAUTO W/SCOPE: CPT

## 2020-08-31 PROCEDURE — 80053 COMPREHEN METABOLIC PANEL: CPT

## 2020-08-31 PROCEDURE — 85730 THROMBOPLASTIN TIME PARTIAL: CPT

## 2020-08-31 PROCEDURE — 85610 PROTHROMBIN TIME: CPT

## 2020-08-31 PROCEDURE — 74177 CT ABD & PELVIS W/CONTRAST: CPT

## 2020-08-31 PROCEDURE — 85025 COMPLETE CBC W/AUTO DIFF WBC: CPT

## 2020-08-31 NOTE — DIAGNOSTIC IMAGING REPORT
PROCEDURE: CT abdomen and pelvis with contrast.



TECHNIQUE: Multiple contiguous axial images were obtained through

the abdomen and pelvis after administration of intravenous

contrast. Auto Exposure Controls were utilized during the CT exam

to meet ALARA standards for radiation dose reduction. 



INDICATION: Left testicular and groin pain.



COMPARISON: Comparison is made to prior examination of

02/27/2020.



FINDINGS: The heart size is normal. The lung bases are clear.

Liver is normal in size without focal lesions. There is no

biliary ductal dilatation. Spleen is normal. Pancreas and adrenal

glands are unremarkable. There is no evidence of nephrolithiasis

or obstructive uropathy. The aorta is nonaneurysmal. Bowel gas

pattern is nonspecific. The appendix is normal. There is no free

air. There is no ascites. There are no focal inflammatory

changes. There is no pelvic mass, adenopathy, or free fluid.

There are mild degenerative changes in spine.



IMPRESSION: No acute abnormality in the abdomen or pelvis.



Mild degenerative changes in the spine.



Dictated by: 



  Dictated on workstation # [id number]

## 2020-08-31 NOTE — ED LOWER EXTREMITY
General


Chief Complaint:  Lower Extremity


Stated Complaint:  L LEG/GROIN PAIN


Nursing Triage Note:  


PT AMBULATE TO ROOM 03 WITH C/O LEFT LEG, GROIN PAIN.


Nursing Sepsis Screen:  No Definite Risk


Source:  patient


Exam Limitations:  no limitations





History of Present Illness


Date Seen by Provider:  Aug 31, 2020


Time Seen by Provider:  14:00


Initial Comments


Patient complaints of continues LLE pain. States pain is unchanged since last ED

visit. Pain has traveled up LLE and now includes scrotal pain. Patient reports 

no pain relief from hydrocodone, ice, or heat


Onset:  last week


Severity:  moderate


Pain/Injury Location:  left leg


Method of Injury:  unknown


Modifying Factors:  Worse With Cold Therapy, Worse With Pain Medication





Allergies and Home Medications


Allergies


Coded Allergies:  


     No Known Drug Allergies (Unverified , 5/6/17)





Home Medications


Doxycycline Hyclate 100 Mg Tablet, 100 MG PO BID


   Prescribed by: BRYAN AGUILAR on 8/31/20 1625


Hydrocodone/Acetaminophen 1 Each Tablet, 1 EACH PO Q4-6HR PRN for PAIN-MODERATE


   Prescribed by: BRYAN AGUILAR on 8/24/20 1858


Mupirocin 22 Gm Oint...g., 22 GM TP BID


   Prescribed by: BRYAN AGUILAR on 5/6/17 1258


Potassium Chloride 20 Meq Tablet.er, 40 MEQ PO BID


   Prescribed by: BRYAN AGUILAR on 8/24/20 1857


Tramadol HCl 50 Mg Tablet, 50 MG PO Q6H


   Prescribed by: BRYAN AGUILAR on 8/31/20 1626





Patient Home Medication List


Home Medication List Reviewed:  Yes





Review of Systems


Constitutional:  see HPI, chills, diaphoresis


EENTM:  no symptoms reported; No ear discharge, No hearing loss


Respiratory:  see HPI


Cardiovascular:  no symptoms reported, see HPI


Gastrointestinal:  no symptoms reported, see HPI


Genitourinary:  see HPI, pain


Psychiatric/Neurological:  See HPI, Anxiety





Past Medical-Social-Family Hx


Patient Social History


Alcohol Use:  Occasionally Uses


Recreational Drug Use:  No


Smoking Status:  Current Everyday Smoker


Type Used:  Cigarettes


2nd Hand Smoke Exposure:  Yes


Recent Foreign Travel:  No


Contact w/Someone Who Travel:  No


Recent Infectious Disease Expo:  No


Recent Hopitalizations:  No





Past Medical History


Surgeries:  Yes (KIDNEY STONES, )


Tonsillectomy, Vasectomy


Respiratory:  No


Cardiac:  No


Neurological:  No


Reproductive Disorders:  No


Kidney Stones


Gastrointestinal:  No


Musculoskeletal:  Yes


Endocrine:  Yes (hypoglycemia)


Cancer:  No


Psychosocial:  No


Blood Disorders:  No





Physical Exam


Vital Signs





Vital Signs - First Documented








 8/31/20 8/31/20





 14:59 17:06


 


Temp 36.8 


 


Pulse 86 


 


Resp 21 


 


B/P (MAP) 135/94 (108) 


 


Pulse Ox  99


 


O2 Delivery Room Air 





Capillary Refill : Less Than 3 Seconds


Height, Weight, BMI


Height: 6'"


Weight: 242lbs. oz. 109.521402zh; 33.00 BMI


Method:Stated


General Appearance:  WD/WN, mild distress, obese


Neck:  full range of motion, normal inspection


Cardiovascular:  normal peripheral pulses, no edema


Respiratory:  no accessory muscle use


Gastrointestinal:  non tender, soft


Back:  normal inspection


Legs:  right leg non-tender; bilateral leg normal inspection; left leg 

ecchymosis, left leg pain, left leg soft tissue tenderness


Neurologic/Tendon:  normal sensation, responds to pain


Neurologic/Psychiatric:  alert, oriented x 3


Skin:  warm/dry; No cyanosis, No cool; ecchymosis; No pallor, No rash





Progress/Results/Core Measures


Results/Orders


Lab Results





Laboratory Tests








Test


 8/31/20


15:00 8/31/20


15:22 Range/Units


 


 


White Blood Count


 8.9 


 


 4.3-11.0


10^3/uL


 


Red Blood Count


 5.10 


 


 4.35-5.85


10^6/uL


 


Hemoglobin 15.7   13.3-17.7  G/DL


 


Hematocrit 47   40-54  %


 


Mean Corpuscular Volume 91   80-99  FL


 


Mean Corpuscular Hemoglobin 31   25-34  PG


 


Mean Corpuscular Hemoglobin


Concent 34 


 


 32-36  G/DL





 


Red Cell Distribution Width 13.5   10.0-14.5  %


 


Platelet Count


 311 


 


 130-400


10^3/uL


 


Mean Platelet Volume 9.2   7.4-10.4  FL


 


Neutrophils (%) (Auto) 69   42-75  %


 


Lymphocytes (%) (Auto) 21   12-44  %


 


Monocytes (%) (Auto) 7   0-12  %


 


Eosinophils (%) (Auto) 4   0-10  %


 


Basophils (%) (Auto) 1   0-10  %


 


Neutrophils # (Auto) 6.1   1.8-7.8  X 10^3


 


Lymphocytes # (Auto) 1.8   1.0-4.0  X 10^3


 


Monocytes # (Auto) 0.6   0.0-1.0  X 10^3


 


Eosinophils # (Auto)


 0.3 


 


 0.0-0.3


10^3/uL


 


Basophils # (Auto)


 0.1 


 


 0.0-0.1


10^3/uL


 


Prothrombin Time 13.4   12.2-14.7  SEC


 


INR Comment 1.0   0.8-1.4  


 


Activated Partial


Thromboplast Time 29 


 


 24-35  SEC





 


Sodium Level 140   135-145  MMOL/L


 


Potassium Level 3.4 L  3.6-5.0  MMOL/L


 


Chloride Level 106     MMOL/L


 


Carbon Dioxide Level 24   21-32  MMOL/L


 


Anion Gap 10   5-14  MMOL/L


 


Blood Urea Nitrogen 6 L  7-18  MG/DL


 


Creatinine


 1.09 


 


 0.60-1.30


MG/DL


 


Estimat Glomerular Filtration


Rate > 60 


 


  





 


BUN/Creatinine Ratio 6    


 


Glucose Level 110 H    MG/DL


 


Calcium Level 8.9   8.5-10.1  MG/DL


 


Corrected Calcium 8.7   8.5-10.1  MG/DL


 


Total Bilirubin 0.3   0.1-1.0  MG/DL


 


Aspartate Amino Transf


(AST/SGOT) 30 


 


 5-34  U/L





 


Alanine Aminotransferase


(ALT/SGPT) 46 


 


 0-55  U/L





 


Alkaline Phosphatase 98     U/L


 


Total Protein 7.2   6.4-8.2  GM/DL


 


Albumin 4.2   3.2-4.5  GM/DL


 


Urine Color  YELLOW   


 


Urine Clarity  CLEAR   


 


Urine pH  6.0  5-9  


 


Urine Specific Gravity  >=1.030  1.016-1.022  


 


Urine Protein  TRACE H NEGATIVE  


 


Urine Glucose (UA)  NEGATIVE  NEGATIVE  


 


Urine Ketones  NEGATIVE  NEGATIVE  


 


Urine Nitrite  NEGATIVE  NEGATIVE  


 


Urine Bilirubin  NEGATIVE  NEGATIVE  


 


Urine Urobilinogen  0.2  < = 1.0  MG/DL


 


Urine Leukocyte Esterase  NEGATIVE  NEGATIVE  


 


Urine RBC (Auto)  NEGATIVE  NEGATIVE  


 


Urine RBC  0-2   /HPF


 


Urine WBC  0-2   /HPF


 


Urine Crystals  NONE   /LPF


 


Urine Bacteria  NEGATIVE   /HPF


 


Urine Casts  NONE   /LPF


 


Urine Mucus  SMALL H  /LPF


 


Urine Culture Indicated  NO   








My Orders





Orders - BRYAN AGUILAR


Ua Culture If Indicated (8/31/20 15:05)


Cbc With Automated Diff (8/31/20 15:05)


Comprehensive Metabolic Panel (8/31/20 15:05)


Us Scrotum (Testicle) [id number] (8/31/20 15:05)


Ct Abdomen/Pelvis W (8/31/20 15:05)


Protime With Inr (8/31/20 15:05)


Partial Thromboplastin Time (8/31/20 15:05)


Ketorolac Injection (Toradol Injection) (8/31/20 15:15)


Iohexol Injection (Omnipaque 350 Mg/Ml 1 (8/31/20 15:15)


Di Iv Start (Assessment) .IV start (8/31/20 15:09)


Received Contrast (Hold Metformin- Contr (8/31/20 15:15)


Sodium Chloride Flush (Catheter Flush Sy (8/31/20 15:15)


Ns (Ivpb) (Sodium Chloride 0.9% Ivpb Bag (8/31/20 15:15)


Lactated Ringers (Lr 1000 Ml Iv Solution (8/31/20 15:45)


Ceftriaxone  For Iv Use (Rocephin  For I (8/31/20 16:30)


Azithromycin Tablet (Zithromax Tablet) (8/31/20 16:30)





Medications Given in ED





Current Medications








 Medications  Dose


 Ordered  Sig/Duy


 Route  Start Time


 Stop Time Status Last Admin


Dose Admin


 


 Azithromycin  1,000 mg  ONCE  ONCE


 PO  8/31/20 16:30


 8/31/20 16:31 DC 8/31/20 16:35


1,000 MG


 


 Ceftriaxone


 Sodium 1000 mg/


 Sterile Water  10 ml @ 


 200 mls/hr  ONCE  ONCE


 IV  8/31/20 16:30


 8/31/20 16:32 DC 8/31/20 16:35


200 MLS/HR


 


 Iohexol  100 ml  ONCE  ONCE


 IV  8/31/20 15:15


 8/31/20 15:16 DC 8/31/20 16:05


100 ML


 


 Ketorolac


 Tromethamine  15 mg  ONCE  ONCE


 IVP  8/31/20 15:15


 8/31/20 15:16 DC 8/31/20 15:18


15 MG


 


 Sodium Chloride  100 ml  ONCE  ONCE


 IV  8/31/20 15:15


 8/31/20 15:16 DC 8/31/20 16:05


80 ML








Vital Signs/I&O











 8/31/20 8/31/20





 14:59 17:06


 


Temp 36.8 


 


Pulse 86 78


 


Resp 21 18


 


B/P (MAP) 135/94 (108) 141/82


 


Pulse Ox  99


 


O2 Delivery Room Air Room Air














Blood Pressure Mean:                    108











Departure


Impression





   Primary Impression:  


   Acute epididymitis


   Additional Impression:  


   Left groin pain


Disposition:  01 HOME, SELF-CARE


Condition:  Stable





Departure-Patient Inst.


Decision time for Depature:  16:22


Referrals:  


NO,LOCAL PHYSICIAN (PCP)


Primary Care Physician


Patient Instructions:  Epididymitis (DC)





Add. Discharge Instructions:  


Ice pack to scrotum with scrotal elevation as necessary. Wear tight underwear. 

Follow up with primary care provider as soon as possible





All discharge instructions reviewed with patient and/or family. Voiced 

understanding.


Scripts


Tramadol HCl (Ultram) 50 Mg Tablet


50 MG PO Q6H, #14 TAB


   Prov: BRYAN AGUILAR         8/31/20 


Doxycycline Hyclate (Doxycycline Hyclate) 100 Mg Tablet


100 MG PO BID, #20 TAB 0 Refills


   Prov: BRYAN AGUILAR         8/31/20











BRYAN AGUILAR             Aug 31, 2020 15:13

## 2020-08-31 NOTE — DIAGNOSTIC IMAGING REPORT
PROCEDURE: 

US Scrotum.



TECHNIQUE: 

Multiple Real-time grayscale images were obtained over the

scrotum in various projections bilaterally.



INDICATION: 

Testicular pain.



FINDINGS: 

The right testicle measures 4.7 x 1.9 x 2.5 cm and the left

testicle measures 4.7 x 1.9 x 3 cm. Both testicles demonstrate

homogeneous echogenicity and normal blood flow. The right

epididymis is normal. The left epididymis is mildly enlarged and

heterogeneous. It also appears mildly hyperemic. There are no

hydroceles. There are no varicoceles.



IMPRESSION: 

No evidence of discrete testicular mass or torsion.



Findings suspect for mild epididymitis on the left.



Dictated by: 



  Dictated on workstation # [id number]

## 2020-09-02 ENCOUNTER — HOSPITAL ENCOUNTER (EMERGENCY)
Dept: HOSPITAL 75 - ER | Age: 40
Discharge: HOME | End: 2020-09-02
Payer: MEDICARE

## 2020-09-02 VITALS — DIASTOLIC BLOOD PRESSURE: 69 MMHG | SYSTOLIC BLOOD PRESSURE: 123 MMHG

## 2020-09-02 VITALS — BODY MASS INDEX: 27.38 KG/M2 | HEIGHT: 71.65 IN | WEIGHT: 199.96 LBS

## 2020-09-02 DIAGNOSIS — N45.1: Primary | ICD-10-CM

## 2020-09-02 DIAGNOSIS — Z77.22: ICD-10-CM

## 2020-09-02 DIAGNOSIS — Z88.1: ICD-10-CM

## 2020-09-02 PROCEDURE — 99283 EMERGENCY DEPT VISIT LOW MDM: CPT

## 2020-09-02 NOTE — ED GU-MALE
General


Chief Complaint:  General Problems/Pain


Stated Complaint:  L TESTICLE INFECTION


Nursing Triage Note:  


ARRIVED VIA AMB TO TRIAGE WITH MULTIPLE CONCERNS.  STATES HE WAS SEEN YESTERDAY 


FOR LEFT TESTICLE  PAIN THAT IS STILL HURTING.  THINKS HE IS HAVING A REACTION 


TO THE DOXYCYCLINE BECAUSE IT MAKES HIM SWEAT AND SICK TO HIS STOMACH.  BOSS 


THINKS HE NEEDS OFF MORE DAYS DUE TO THE REACTION TO THE ANTIBIOTIC.





History of Present Illness


Date Seen by Provider:  Sep 2, 2020


Time Seen by Provider:  11:55


Initial Comments


80-year-old  male seen here 2 days ago for epididymitis and started on 

doxycycline returns for continued symptoms and possible reaction to his 

antibiotic. Reports nausea and diaphoresis after taking the doxycycline, no 

swelling of his tongue/lips or rash and no SOA.  He is also concerned about 

trying to return to work. He has had no fevers. He is able to urinate without 

complaints.


Timing/Duration:  changing over time


Severity/Quality:  mild





Allergies and Home Medications


Allergies


Coded Allergies:  


     levofloxacin (Verified  Allergy, Unknown, 9/2/20)


     doxycycline (Verified  Adverse Reaction, Mild, Vomiting, 9/2/20)





Home Medications


Amoxicillin/Potassium Clav 1 Each Tablet, 1 EACH PO BID


   Prescribed by: FAUSTO STEWART on 9/2/20 1244


Doxycycline Hyclate 100 Mg Tablet, 100 MG PO BID


   Prescribed by: BRYAN AGUILAR on 8/31/20 1625


Hydrocodone/Acetaminophen 1 Each Tablet, 1 EACH PO Q4-6HR PRN for PAIN-MODERATE


   Prescribed by: BRYAN AGUILAR on 8/24/20 1858


Levofloxacin 500 Mg Tablet, 500 MG PO DAILY


   Prescribed by: FAUSTO STEWART on 9/2/20 1226


Mupirocin 22 Gm Oint...g., 22 GM TP BID


   Prescribed by: BRYAN AGUILAR on 5/6/17 1258


Potassium Chloride 20 Meq Tablet.er, 40 MEQ PO BID


   Prescribed by: BRYAN AGUILAR on 8/24/20 1857


Tramadol HCl 50 Mg Tablet, 50 MG PO Q6H


   Prescribed by: BRYAN AGUILAR on 8/31/20 1626





Patient Home Medication List


Home Medication List Reviewed:  Yes





Review of Systems


Review of Systems


Constitutional:  no symptoms reported, see HPI


Genitourinary:  see HPI; denies flank pain; pain (left testicle, slight 

improvement); denies urgency





All Other Systemes Reviewed


Negative Unless Noted:  Yes





Past Medical-Social-Family Hx


Past Med/Social Hx:  Reviewed Nursing Past Med/Soc Hx


Patient Social History


Type Used:  Cigarettes


2nd Hand Smoke Exposure:  Yes


Recent Foreign Travel:  No


Contact w/Someone Who Travel:  No


Recent Infectious Disease Expo:  No


Recent Hopitalizations:  No





Past Medical History


Surgeries:  Yes (KIDNEY STONES, )


Tonsillectomy, Vasectomy


Respiratory:  No


Cardiac:  No


Neurological:  No


Reproductive Disorders:  No


Kidney Stones


Gastrointestinal:  No


Musculoskeletal:  Yes


Endocrine:  Yes (hypoglycemia)


Cancer:  No


Psychosocial:  No


Blood Disorders:  No





Physical Exam


Vital Signs





Vital Signs - First Documented








 9/2/20





 11:55


 


Temp 37.0


 


Pulse 120


 


Resp 16


 


B/P (MAP) 123/69 (87)


 


Pulse Ox 96


 


O2 Delivery Room Air





Capillary Refill : Less Than 3 Seconds


Height, Weight, BMI


Height: 6'"


Weight: 242lbs. oz. 109.686102ad; 27.00 BMI


Method:Stated


General Appearance:  WD/WN, no apparent distress


Cardiovascular:  normal peripheral pulses, regular rate, rhythm


Respiratory:  chest non-tender, lungs clear, normal breath sounds


Gastrointestinal:  normal bowel sounds, non tender, soft


Genital/Rectal:  normal genital exam, tenderness (sligth tenderness left 

testicle, no mass or nodules palpated, no swelling. )


Extremities:  normal range of motion, non-tender, normal inspection


Neurologic/Psychiatric:  no motor/sensory deficits, alert, normal mood/affect, 

oriented x 3


Skin:  normal color, warm/dry; No diaphoresis, No rash





Progress/Results/Core Measures


Suspected Sepsis


Recent Fever Within 48 Hours:  No


Infection Criteria Present:  None


New/Unexplained  Altered Menta:  No


Sepsis Screen:  No Definite Risk


SIRS


Temperature: 


Pulse: 120 


Respiratory Rate: 16


 


Blood Pressure 123 /69 


Mean: 87





Results/Orders


My Orders





Orders - FAUSTO STEWART


Hydrocodone/Apap 5/325 Tablet (Lortab 5 (9/2/20 12:30)


Ondansetron  Oral Dissolve Tab (Zofran (9/2/20 12:16)





Medications Given in ED





Current Medications








 Medications  Dose


 Ordered  Sig/Duy


 Route  Start Time


 Stop Time Status Last Admin


Dose Admin


 


 Acetaminophen/


 Hydrocodone Bitart  1 tab  ONCE  ONCE


 PO  9/2/20 12:30


 9/2/20 12:31 DC 9/2/20 12:25


1 TAB








Vital Signs/I&O











 9/2/20





 11:55


 


Temp 37.0


 


Pulse 120


 


Resp 16


 


B/P (MAP) 123/69 (87)


 


Pulse Ox 96


 


O2 Delivery Room Air





Capillary Refill : Less Than 3 Seconds








Blood Pressure Mean:                    87











Departure


Impression





   Primary Impression:  


   Epididymitis, left


Disposition:  01 HOME, SELF-CARE


Condition:  Improved





Departure-Patient Inst.


Decision time for Depature:  12:25


Referrals:  


[facility]





NO,LOCAL PHYSICIAN (PCP)


Primary Care Physician


Patient Instructions:  Epididymitis (DC)





Add. Discharge Instructions:  


Stop taking the doxycycline and take the new antibiotic as directed.


Continue to alternate between heat and ice on your left testicle.


Continue to take tramadol 1 tablet every 8 hours.


Alternate between Tylenol 650 mg and ibuprofen 600 mg every 4 hours for 

additional pain.


Take food with your medication to prevent nausea.


Follow-up with your [facility]Y [facility] at [facility] if symptoms are not improving or 

worsen.


Return to the emergency department for new, urgent health care needs.





All discharge instructions reviewed with patient and/or family. Voiced 

understanding.


Scripts


Amoxicillin/Potassium Clav (Augmentin 875-125 Tablet) 1 Each Tablet


1 EACH PO BID, #20 TAB 0 Refills


   Prov: FAUSTO STEWART         9/2/20 


Levofloxacin (Levaquin) 500 Mg Tablet


500 MG PO DAILY, #10 TAB 0 Refills


   Prov: FAUSTO STEWART         9/2/20


Work/School Note:  Work Release Form   Date Seen in the Emergency Department:  

Sep 2, 2020


   Return to Work:  Sep 4, 2020


   Restrictions:  No Restrictions


   Other Restrictions Listed Below:  break to ice for 15 min every 4-6 hours











FAUSTO STEWART                   Sep 2, 2020 12:22

## 2020-11-04 ENCOUNTER — HOSPITAL ENCOUNTER (EMERGENCY)
Dept: HOSPITAL 75 - ER | Age: 40
Discharge: HOME | End: 2020-11-04
Payer: MEDICARE

## 2020-11-04 VITALS — HEIGHT: 66.93 IN | WEIGHT: 224.87 LBS | BODY MASS INDEX: 35.29 KG/M2

## 2020-11-04 VITALS — DIASTOLIC BLOOD PRESSURE: 92 MMHG | SYSTOLIC BLOOD PRESSURE: 145 MMHG

## 2020-11-04 DIAGNOSIS — Z88.2: ICD-10-CM

## 2020-11-04 DIAGNOSIS — J40: Primary | ICD-10-CM

## 2020-11-04 DIAGNOSIS — Z88.1: ICD-10-CM

## 2020-11-04 DIAGNOSIS — Z20.828: ICD-10-CM

## 2020-11-04 DIAGNOSIS — F17.210: ICD-10-CM

## 2020-11-04 PROCEDURE — 87804 INFLUENZA ASSAY W/OPTIC: CPT

## 2020-11-04 PROCEDURE — 71045 X-RAY EXAM CHEST 1 VIEW: CPT

## 2020-11-04 PROCEDURE — 87635 SARS-COV-2 COVID-19 AMP PRB: CPT

## 2020-11-04 PROCEDURE — 99282 EMERGENCY DEPT VISIT SF MDM: CPT

## 2020-11-04 NOTE — DIAGNOSTIC IMAGING REPORT
INDICATION: Cough, congestion and sore throat 



COMPARISON: 10/28/2011



FINDINGS:



There is some perihilar interstitial opacity greater right with

thickening of the central airways. This may reflect reactive

airway disease or a viral pattern. No airspace or alveolar

consolidation. No effusion or pneumothorax.



IMPRESSION:

Some perihilar airway thickening and bronchial cuffing may

reflect viral pattern or reactive airway disease. No

consolidating pneumonia, pleural pathology or failure pattern.



Dictated by: 



  Dictated on workstation # [id number]

## 2020-11-04 NOTE — ED COUGH/URI
General


Chief Complaint:  Cough/Cold/Flu Symptoms


Stated Complaint:  COUGH,CHILLS, LOSS OF TASTE


Nursing Triage Note:  


ARRIVED VIA AMB TO ROOM 08 AS A PUI.  COMPLAINS OF COUGH, CONGESTION, AND SORE 


THROAT X2 DAYS.


Sepsis Screen:  No Definite Risk


Source:  patient


Exam Limitations:  no limitations





History of Present Illness


Date Seen by Provider:  Nov 4, 2020


Time Seen by Provider:  12:02


Initial Comments


To ER with c/o cough loss of taste congestion and sore throat for two days. 

Smokes. Works at [employer].  Questionable fevers. Has been 

out of his zyprexa x4 months since moving here. States he formerly took it for 

PTSD. Since being off of it he has had worsening of mood and "anger outbreaks".


Timing/Duration:  getting worse


Severity/Quality:  productive cough


Associated Symptoms:  cough





Allergies and Home Medications


Allergies


Coded Allergies:  


     Sulfa (Sulfonamide Antibiotics) (Verified  Allergy, Severe, HIVES, 11/4/20)


     levofloxacin (Verified  Allergy, Unknown, 9/2/20)


     doxycycline (Verified  Adverse Reaction, Mild, Vomiting, 9/2/20)





Patient Home Medication List


Home Medication List Reviewed:  Yes





Review of Systems


Review of Systems


Constitutional:  see HPI


EENTM:  see HPI


Respiratory:  see HPI, cough


Cardiovascular:  no symptoms reported


Genitourinary:  no symptoms reported


Musculoskeletal:  no symptoms reported


Skin:  no symptoms reported


Psychiatric/Neurological:  No Symptoms Reported


Hematologic/Lymphatic:  No Symptoms Reported


Immunological/Allergic:  no symptoms reported





Past Medical-Social-Family Hx


Patient Social History


Alcohol Use:  Occasionally Uses


Recreational Drug Use:  No


Smoking Status:  Current Everyday Smoker


Type Used:  Cigarettes


2nd Hand Smoke Exposure:  Yes


Recent Foreign Travel:  No


Contact w/Someone Who Travel:  No


Recent Infectious Disease Expo:  No


Recent Hopitalizations:  No





Past Medical History


Surgeries:  Yes (KIDNEY STONES, )


Tonsillectomy, Vasectomy


Respiratory:  No


Cardiac:  No


Neurological:  No


Reproductive Disorders:  No


Kidney Stones


Gastrointestinal:  No


Musculoskeletal:  Yes


Endocrine:  Yes (hypoglycemia)


Cancer:  No


Psychosocial:  No


Blood Disorders:  No





Physical Exam





Vital Signs - First Documented








 11/4/20





 11:45


 


Temp 36.2


 


Pulse 69


 


Resp 16


 


B/P (MAP) 145/92 (109)


 


Pulse Ox 97


 


O2 Delivery Room Air





Capillary Refill : Less Than 3 Seconds


Height: 6'"


Weight: 242lbs. oz. 109.912187km; 35.00 BMI


Method:Stated


General Appearance:  WD/WN, no apparent distress, other (no distress, resps 

nonlabored and no tachypnea. lungs without wheezing or crackles. sats 97% room 

air, HR 78. )


Eyes:  Bilateral Eye Normal Inspection, Bilateral Eye PERRL, Bilateral Eye EOMI


HEENT:  PERRL/EOMI, normal ENT inspection


Respiratory:  normal breath sounds, no respiratory distress, no accessory muscle

use


Gastrointestinal:  normal bowel sounds, non tender, soft


Extremities:  normal range of motion, non-tender


Neurologic/Psychiatric:  alert, normal mood/affect, oriented x 3


Skin:  normal color, warm/dry





Progress/Results/Core Measures


Suspected Sepsis


Recent Fever Within 48 Hours:  No


Infection Criteria Present:  None


New/Unexplained  Altered Menta:  No


Sepsis Screen:  No Definite Risk


SIRS


Temperature: 


Pulse: 69 


Respiratory Rate: 16


 


Blood Pressure 145 /92 


Mean: 109





Results/Orders


My Orders





Orders - BRYAN AGUILAR


Coronavirus Sars-Cov-2 So 2019 (11/4/20 12:00)


Influenza A And B Antigens (11/4/20 12:00)


Chest 1 View, Ap/Pa Only (11/4/20 12:00)





Vital Signs/I&O











 11/4/20





 11:45


 


Temp 36.2


 


Pulse 69


 


Resp 16


 


B/P (MAP) 145/92 (109)


 


Pulse Ox 97


 


O2 Delivery Room Air





Capillary Refill : Less Than 3 Seconds








Blood Pressure Mean:                    109











Departure


Communication (Admissions)


will do rx for abx and steroids given his smoking hx and poor mucociliary 

funciton. Will do short rx for zyprexa.





Impression





   Primary Impression:  


   Bronchitis


Disposition:  01 HOME, SELF-CARE


Condition:  Stable





Departure-Patient Inst.


Decision time for Depature:  12:07


Referrals:  


NO,LOCAL PHYSICIAN (PCP/Family)


Primary Care Physician


Patient Instructions:  Acute Bronchitis





Add. Discharge Instructions:  


1. Tylenol and motrin for fevers. 


2. Antibiotics and steroids as directed


3. follow up with your doctor next week. 





All discharge instructions reviewed with patient and/or family. Voiced 

understanding.


Scripts


Prednisone (Prednisone) 20 Mg Tab


40 MG PO DAILY, #4 TAB 0 Refills


   Prov: BRYAN AGUILAR         11/4/20 


Cefuroxime Axetil (Cefuroxime) 250 Mg Tablet


250 MG PO BID, #10 TAB


   Prov: BRYAN AGUILAR         11/4/20 


Olanzapine (Zyprexa) 5 Mg Tablet


5 MG PO DAILY, #10 TAB


   Prov: BRYAN AGUILAR         11/4/20


Work/School Note:  Work Release Form   Date Seen in the Emergency Department:  

Nov 4, 2020


   Return to Work:  Nov 4, 2020


   Restrictions:  Need Release from Doctor











BRYAN AGUILAR              Nov 4, 2020 12:07

## 2020-11-09 ENCOUNTER — HOSPITAL ENCOUNTER (EMERGENCY)
Dept: HOSPITAL 75 - ER | Age: 40
Discharge: HOME | End: 2020-11-09
Payer: MEDICARE

## 2020-11-09 VITALS — DIASTOLIC BLOOD PRESSURE: 90 MMHG | SYSTOLIC BLOOD PRESSURE: 121 MMHG

## 2020-11-09 VITALS — HEIGHT: 71.65 IN | WEIGHT: 229.28 LBS | BODY MASS INDEX: 31.4 KG/M2

## 2020-11-09 DIAGNOSIS — Z79.52: ICD-10-CM

## 2020-11-09 DIAGNOSIS — F43.10: ICD-10-CM

## 2020-11-09 DIAGNOSIS — F41.9: ICD-10-CM

## 2020-11-09 DIAGNOSIS — F17.210: ICD-10-CM

## 2020-11-09 DIAGNOSIS — J40: Primary | ICD-10-CM

## 2020-11-09 DIAGNOSIS — Z88.1: ICD-10-CM

## 2020-11-09 DIAGNOSIS — Z88.2: ICD-10-CM

## 2020-11-09 DIAGNOSIS — Z20.828: ICD-10-CM

## 2020-11-09 LAB
ALBUMIN SERPL-MCNC: 4.1 GM/DL (ref 3.2–4.5)
ALP SERPL-CCNC: 88 U/L (ref 40–136)
ALT SERPL-CCNC: 35 U/L (ref 0–55)
BASOPHILS # BLD AUTO: 0.1 10^3/UL (ref 0–0.1)
BASOPHILS NFR BLD AUTO: 1 % (ref 0–10)
BILIRUB SERPL-MCNC: 0.3 MG/DL (ref 0.1–1)
BUN/CREAT SERPL: 5
CALCIUM SERPL-MCNC: 8.6 MG/DL (ref 8.5–10.1)
CHLORIDE SERPL-SCNC: 102 MMOL/L (ref 98–107)
CO2 SERPL-SCNC: 24 MMOL/L (ref 21–32)
CREAT SERPL-MCNC: 1.01 MG/DL (ref 0.6–1.3)
EOSINOPHIL # BLD AUTO: 0.3 10^3/UL (ref 0–0.3)
EOSINOPHIL NFR BLD AUTO: 4 % (ref 0–10)
ERYTHROCYTE [SEDIMENTATION RATE] IN BLOOD: 9 MM/HR (ref 0–15)
GFR SERPLBLD BASED ON 1.73 SQ M-ARVRAT: > 60 ML/MIN
GLUCOSE SERPL-MCNC: 98 MG/DL (ref 70–105)
HCT VFR BLD CALC: 44 % (ref 40–54)
HGB BLD-MCNC: 14.7 G/DL (ref 13.3–17.7)
LYMPHOCYTES # BLD AUTO: 2.1 10^3/UL (ref 1–4)
LYMPHOCYTES NFR BLD AUTO: 27 % (ref 12–44)
MANUAL DIFFERENTIAL PERFORMED BLD QL: NO
MCH RBC QN AUTO: 32 PG (ref 25–34)
MCHC RBC AUTO-ENTMCNC: 33 G/DL (ref 32–36)
MCV RBC AUTO: 95 FL (ref 80–99)
MONOCYTES # BLD AUTO: 0.6 10^3/UL (ref 0–1)
MONOCYTES NFR BLD AUTO: 7 % (ref 0–12)
NEUTROPHILS # BLD AUTO: 4.7 10^3/UL (ref 1.8–7.8)
NEUTROPHILS NFR BLD AUTO: 61 % (ref 42–75)
PLATELET # BLD: 308 10^3/UL (ref 130–400)
PMV BLD AUTO: 8.7 FL (ref 9–12.2)
POTASSIUM SERPL-SCNC: 3.1 MMOL/L (ref 3.6–5)
PROT SERPL-MCNC: 6.8 GM/DL (ref 6.4–8.2)
SODIUM SERPL-SCNC: 139 MMOL/L (ref 135–145)
WBC # BLD AUTO: 7.7 10^3/UL (ref 4.3–11)

## 2020-11-09 PROCEDURE — 80053 COMPREHEN METABOLIC PANEL: CPT

## 2020-11-09 PROCEDURE — 86141 C-REACTIVE PROTEIN HS: CPT

## 2020-11-09 PROCEDURE — 83605 ASSAY OF LACTIC ACID: CPT

## 2020-11-09 PROCEDURE — 99284 EMERGENCY DEPT VISIT MOD MDM: CPT

## 2020-11-09 PROCEDURE — 93041 RHYTHM ECG TRACING: CPT

## 2020-11-09 PROCEDURE — 71045 X-RAY EXAM CHEST 1 VIEW: CPT

## 2020-11-09 PROCEDURE — 83615 LACTATE (LD) (LDH) ENZYME: CPT

## 2020-11-09 PROCEDURE — 85379 FIBRIN DEGRADATION QUANT: CPT

## 2020-11-09 PROCEDURE — 85025 COMPLETE CBC W/AUTO DIFF WBC: CPT

## 2020-11-09 PROCEDURE — 36415 COLL VENOUS BLD VENIPUNCTURE: CPT

## 2020-11-09 PROCEDURE — 84145 PROCALCITONIN (PCT): CPT

## 2020-11-09 PROCEDURE — 85652 RBC SED RATE AUTOMATED: CPT

## 2020-11-09 PROCEDURE — 87635 SARS-COV-2 COVID-19 AMP PRB: CPT

## 2020-11-09 PROCEDURE — 87040 BLOOD CULTURE FOR BACTERIA: CPT

## 2020-11-09 PROCEDURE — 87804 INFLUENZA ASSAY W/OPTIC: CPT

## 2020-11-09 PROCEDURE — 93005 ELECTROCARDIOGRAM TRACING: CPT

## 2020-11-09 NOTE — DIAGNOSTIC IMAGING REPORT
INDICATION: 

Shortness of breath, cough, and headache.



TECHNIQUE/COMPARISON: 

A frontal chest was obtained at 1:44 PM and compared to

11/04/2020.



FINDINGS:

The heart and mediastinal silhouette are normal in appearance.

There is no acute infiltrate, pneumothorax, or pleural fluid.



IMPRESSION:

No acute process the chest with no change from 11/04/2020.



Dictated by: 



  Dictated on workstation # [id number]

## 2020-11-09 NOTE — ED RESPIRATORY
General


Stated Complaint:  RESP ISSUES


Source:  patient, old records





History of Present Illness


Date Seen by Provider:  Nov 9, 2020


Time Seen by Provider:  10:55


Initial Comments


PT ARRIVES VIA POV FROM HOME


STATES HE HAS BEEN SICK FOR ABOUT A WEEK WITH COUGH/CONGESTION, SORE THROAT, 

LOSS OF TASTE AND SMELL, HEADACHE, BODY ACHES, FEVER UP , CHILLS, 

NAUSEA/VOMITING/DIARRHEA, FATIGUE, GENERALIZED WEAKNESS


BEGAN HAVING SHORTNESS OF BREATH LAST NIGHT


HURTS TO COUGH OR BREATHE





TOOK SOME ASPIRIN TODAY FOR HIS FEVER





SEEN HERE 11/04/20 FOR SAME, BUT ALSO WAS HERE FOR REFILLS ON HIS ZYPREXA.  

COVID TEST NEGATIVE AT THAT TIME, GIVEN RX'S FOR CEFUROXIME AND PREDNISONE. 


STATES HE IS NOT BETTER AND FEELING WORSE


HAS NOT ATTEMPTED TO FOLLOW UP WITH [facility] AT ANY TIME





CLAIMS NO SICK CONTACTS OR KNOWN EXPOSURE TO COVID-19, BUT PT WORKS AT [employer]





PCP: FERNANDO. NP TAMIA JULIAN





Allergies and Home Medications


Allergies


Coded Allergies:  


     Sulfa (Sulfonamide Antibiotics) (Verified  Allergy, Severe, HIVES, 11/4/20)


     levofloxacin (Verified  Allergy, Unknown, 9/2/20)


     doxycycline (Verified  Adverse Reaction, Mild, Vomiting, 9/2/20)





Home Medications


Azithromycin 500 Mg Tablet, 500 MG PO DAILY


   Prescribed by: RIDDHI VAUGHN on 11/9/20 1354


Cefuroxime Axetil 250 Mg Tablet, 250 MG PO BID


   Prescribed by: BRYAN AGUILAR on 11/4/20 1210


Guaifenesin/Dextromethorphan 1 Each Tbmp.12hr, 1 EACH PO BID


   Prescribed by: RIDDHI VAUGHN on 11/9/20 1354


Olanzapine 5 Mg Tablet, 5 MG PO DAILY


   Prescribed by: BRYAN AGUILAR on 11/4/20 1210


Prednisone 20 Mg Tab, 40 MG PO DAILY


   Prescribed by: BRYAN AGUILAR on 11/4/20 1210





Patient Home Medication List


Home Medication List Reviewed:  Yes





Review of Systems


Review of Systems


Constitutional:  see HPI, chills, diaphoresis, fever, malaise, weakness


EENTM:  see HPI, nose congestion, throat pain


Respiratory:  see HPI, cough; No phlegm; short of breath


Cardiovascular:  chest pain (HURTS TO COUGH OR BREATH)


Genitourinary:  no symptoms reported


Musculoskeletal:  see HPI


Skin:  no symptoms reported


Psychiatric/Neurological:  See HPI, Headache


Hematologic/Lymphatic:  No Symptoms Reported


Immunological/Allergic:  no symptoms reported





Past Medical-Social-Family Hx


Past Med/Social Hx:  Reviewed and Corrections made


Patient Social History


Alcohol Use:  Occasionally Uses


Recreational Drug Use:  No (DENIES)


Smoking Status:  Current Everyday Smoker (1 PPD)


Type Used:  Cigarettes


2nd Hand Smoke Exposure:  Yes


Recent Hopitalizations:  No





Past Medical History


Surgeries:  Yes (KIDNEY STONES--BASKET REMOVAL, )


Tonsillectomy, Vasectomy


Respiratory:  No


Cardiac:  No


Neurological:  No


Reproductive Disorders:  No


Genitourinary:  Yes (EPIDIDYMITIS)


Kidney Stones


Gastrointestinal:  No


Musculoskeletal:  Yes


Chronic Back Pain


Endocrine:  Yes (hypoglycemia)


HEENT:  Yes (TONSILLECTOMY)


Tonsilitis


Cancer:  No


Psychosocial:  Yes


Anxiety, PTSD


Integumentary:  No


Blood Disorders:  No





Physical Exam





Vital Signs - First Documented








 11/9/20





 13:00


 


Temp 37.1


 


Pulse 79


 


Resp 16


 


B/P (MAP) 153/100 (117)


 


Pulse Ox 94


 


O2 Delivery Room Air





Capillary Refill :


Height: 6'"


Weight: 242lbs. oz. 109.136619ad; 35.00 BMI


Method:Stated


General Appearance:  WD/WN, no apparent distress, other (DIRTY, MALODOROUS)


HEENT:  PERRL/EOMI


Neck:  normal inspection


Respiratory:  normal breath sounds, no respiratory distress, no accessory muscle

use


Cardiovascular:  regular rate, rhythm, no murmur


Gastrointestinal:  non tender, soft


Extremities:  normal inspection, no pedal edema, normal capillary refill


Neurologic/Psychiatric:  CNs II-XII nml as tested, no motor/sensory deficits, 

alert, normal mood/affect, oriented x 3


Skin:  normal color (FACE FLUSHED), warm/dry





Focused Exam


Lactate Level


11/9/20 13:10: Lactic Acid Level 1.75





Lactic Acid Level





Laboratory Tests








Test


 11/9/20


13:10


 


Lactic Acid Level


 1.75 MMOL/L


(0.50-2.00)











Progress/Results/Core Measures


Suspected Sepsis


SIRS


Temperature: 


Pulse:  


Respiratory Rate: 


 


Laboratory Tests


11/9/20 13:10: White Blood Count 7.7


Blood Pressure  / 


Mean: 


 





11/9/20 13:10: Lactic Acid Level 1.75


Laboratory Tests


11/9/20 13:10: 


Creatinine 1.01, Platelet Count 308, Total Bilirubin 0.3








Results/Orders


Lab Results





Laboratory Tests








Test


 11/9/20


13:10 11/9/20


13:15 11/9/20


14:30 Range/Units


 


 


White Blood Count


 7.7 


 


 


 4.3-11.0


10^3/uL


 


Red Blood Count


 4.64 


 


 


 4.30-5.52


10^6/uL


 


Hemoglobin 14.7    13.3-17.7  g/dL


 


Hematocrit 44    40-54  %


 


Mean Corpuscular Volume 95    80-99  fL


 


Mean Corpuscular Hemoglobin 32    25-34  pg


 


Mean Corpuscular Hemoglobin


Concent 33 


 


 


 32-36  g/dL





 


Red Cell Distribution Width 13.8    10.0-14.5  %


 


Platelet Count


 308 


 


 


 130-400


10^3/uL


 


Mean Platelet Volume 8.7 L   9.0-12.2  fL


 


Immature Granulocyte % (Auto) 0     %


 


Neutrophils (%) (Auto) 61    42-75  %


 


Lymphocytes (%) (Auto) 27    12-44  %


 


Monocytes (%) (Auto) 7    0-12  %


 


Eosinophils (%) (Auto) 4    0-10  %


 


Basophils (%) (Auto) 1    0-10  %


 


Neutrophils # (Auto)


 4.7 


 


 


 1.8-7.8


10^3/uL


 


Lymphocytes # (Auto)


 2.1 


 


 


 1.0-4.0


10^3/uL


 


Monocytes # (Auto)


 0.6 


 


 


 0.0-1.0


10^3/uL


 


Eosinophils # (Auto)


 0.3 


 


 


 0.0-0.3


10^3/uL


 


Basophils # (Auto)


 0.1 


 


 


 0.0-0.1


10^3/uL


 


Immature Granulocyte # (Auto)


 0.0 


 


 


 0.0-0.1


10^3/uL


 


Erythrocyte Sedimentation Rate 9    0-15  MM/HR


 


D-Dimer


 0.32 


 


 


 0.00-0.49


UG/ML


 


Sodium Level 139    135-145  MMOL/L


 


Potassium Level 3.1 L   3.6-5.0  MMOL/L


 


Chloride Level 102      MMOL/L


 


Carbon Dioxide Level 24    21-32  MMOL/L


 


Anion Gap 13    5-14  MMOL/L


 


Blood Urea Nitrogen 5 L   7-18  MG/DL


 


Creatinine


 1.01 


 


 


 0.60-1.30


MG/DL


 


Estimat Glomerular Filtration


Rate > 60 


 


 


  





 


BUN/Creatinine Ratio 5     


 


Glucose Level 98      MG/DL


 


Lactic Acid Level


 1.75 


 


 


 0.50-2.00


MMOL/L


 


Calcium Level 8.6    8.5-10.1  MG/DL


 


Corrected Calcium 8.5    8.5-10.1  MG/DL


 


Total Bilirubin 0.3    0.1-1.0  MG/DL


 


Aspartate Amino Transf


(AST/SGOT) 28 


 


 


 5-34  U/L





 


Alanine Aminotransferase


(ALT/SGPT) 35 


 


 


 0-55  U/L





 


Alkaline Phosphatase 88      U/L


 


Lactate Dehydrogenase 218    125-220  U/L


 


C-Reactive Protein High


Sensitivity 0.41 


 


 


 0.00-0.50


MG/DL


 


Total Protein 6.8    6.4-8.2  GM/DL


 


Albumin 4.1    3.2-4.5  GM/DL


 


Procalcitonin 0.04    <0.10  NG/ML


 


Coronavirus 2019 (AVANI)  Negative   Negative  








Micro Results





Microbiology


11/9/20 Influenza Types A,B Antigen (JOSE EDUARDO) - Final, Complete


          





My Orders





Orders - RIDDHI VAUGHN DO


Ed Iv/Invasive Line Start (11/9/20 12:50)


Ekg Tracing (11/9/20 12:50)


O2 (11/9/20 12:50)


Monitor-Rhythm Ecg Trace Only (11/9/20 12:50)


Cbc With Automated Diff (11/9/20 12:50)


Comprehensive Metabolic Panel (11/9/20 12:50)


Fibrin Degradation Products (11/9/20 12:50)


Procalcitonin (Pct) (11/9/20 12:50)


Hs C Reactive Protein (11/9/20 12:50)


Erythrocyte Sedimentation Rate (11/9/20 12:50)


LDH (11/9/20 12:50)


Blood Culture (11/9/20 12:50)


Influenza A And B Antigens (11/9/20 12:50)


Chest 1 View, Ap/Pa Only (11/9/20 12:50)


Covid 19 Inhouse Test (11/9/20 12:50)


Lactic Acid Analyzer (11/9/20 13:38)


Coronavirus Sars-Cov-2 So 2019 (11/9/20 13:51)





Vital Signs/I&O











 11/9/20 11/9/20





 13:00 14:38


 


Temp 37.1 


 


Pulse 79 74


 


Resp 16 16


 


B/P (MAP) 153/100 (117) 121/90


 


Pulse Ox 94 93


 


O2 Delivery Room Air Room Air





Capillary Refill :


Progress Note :  


Progress Note


PLACED IN ISOLATION ROOM


PPE WORN AT ALL TIMES


COVIDJ-19 TESTING PERFORMED


PT ADVISED OF NEED FOR QUARANTINE 





NO DYSPNEA, NO HYPOXIA OR COUGH NOTED DURING ER STAY


VITALS STABLE





ECG


Initial ECG Impression Date:  Nov 9, 2020


Initial ECG Impression Time:  13:06


Initial ECG Rate:  89


Initial ECG Rhythm:  Normal Sinus





Diagnostic Imaging





Comments


CXR--PER RADIOLOGIST REPORT AT 1350


FINDINGS:


The heart and mediastinal silhouette are normal in appearance.


There is no acute infiltrate, pneumothorax, or pleural fluid.





IMPRESSION:


No acute process the chest with no change from 11/04/2020.


   Reviewed:  Reviewed by Me





Departure


Impression





   Primary Impression:  


   Bronchitis


   Additional Impression:  


   Person under investigation for COVID-19


Disposition:  01 HOME, SELF-CARE


Condition:  Stable





Departure-Patient Inst.


Referrals:  


NO,LOCAL PHYSICIAN (PCP)


Primary Care Physician








[facility]


Patient Instructions:  Preventing the Spread of an Infectious Disease, 

Coronavirus Disease 2019 (COVID-19) Overview, Acute Bronchitis, Adult (DC)





Add. Discharge Instructions:  


FINISH CEFUROXIME ANTIBIOTIC AND PREDNISONE AS PRESCRIBED





TYLENOL 1 GRAM/ MOTRIN 800 MG 4 TIMES A DAY FOR PAIN AND FEVER OVER 101





LOTS OF CLEAR LIQUIDS





QUARANTINE FOR YOURSELF AND ALL HOUSEHOLD MEMBERS AND CLOSE CONTACTS UNTIL 

CLEARED BY  OR HEALTH DEPT. 





[facility]-5 DAYS IF NO BETTER


Scripts


Guaifenesin/Dextromethorphan (Mucinex Dm ER 1,200-60 mg Tab) 1 Each Tbmp.12hr


1 EACH PO BID, #20 EA


   Prov: RIDDHI VAUGHN DO         11/9/20 


Azithromycin (Zithromax) 500 Mg Tablet


500 MG PO DAILY for 5 Days, #5 TAB


   Prov: RIDDHI VAUGHN DO         11/9/20


Work/School Note:  Work Release Form   Date Seen in the Emergency Department:  

Nov 9, 2020


   Return to Work:  Nov 23, 2020











RIDDHI VAUGHN DO                  Nov 9, 2020 13:19

## 2020-11-27 ENCOUNTER — HOSPITAL ENCOUNTER (EMERGENCY)
Dept: HOSPITAL 75 - ER | Age: 40
Discharge: HOME | End: 2020-11-27
Payer: MEDICARE

## 2020-11-27 VITALS — WEIGHT: 222.89 LBS | HEIGHT: 69.61 IN | BODY MASS INDEX: 32.27 KG/M2

## 2020-11-27 VITALS — SYSTOLIC BLOOD PRESSURE: 125 MMHG | DIASTOLIC BLOOD PRESSURE: 87 MMHG

## 2020-11-27 DIAGNOSIS — R07.9: Primary | ICD-10-CM

## 2020-11-27 DIAGNOSIS — Z88.2: ICD-10-CM

## 2020-11-27 DIAGNOSIS — F17.210: ICD-10-CM

## 2020-11-27 DIAGNOSIS — F41.9: ICD-10-CM

## 2020-11-27 DIAGNOSIS — Z79.52: ICD-10-CM

## 2020-11-27 DIAGNOSIS — Z88.1: ICD-10-CM

## 2020-11-27 DIAGNOSIS — R20.2: ICD-10-CM

## 2020-11-27 LAB
ALBUMIN SERPL-MCNC: 3.9 GM/DL (ref 3.2–4.5)
ALP SERPL-CCNC: 77 U/L (ref 40–136)
ALT SERPL-CCNC: 25 U/L (ref 0–55)
AMYLASE SERPL-CCNC: 39 U/L (ref 25–125)
APTT BLD: 31 SEC (ref 24–35)
APTT PPP: YELLOW S
BACTERIA #/AREA URNS HPF: NEGATIVE /HPF
BASOPHILS # BLD AUTO: 0.1 10^3/UL (ref 0–0.1)
BASOPHILS NFR BLD AUTO: 1 % (ref 0–10)
BILIRUB SERPL-MCNC: 0.8 MG/DL (ref 0.1–1)
BILIRUB UR QL STRIP: NEGATIVE
BUN/CREAT SERPL: 8
CALCIUM SERPL-MCNC: 8.2 MG/DL (ref 8.5–10.1)
CHLORIDE SERPL-SCNC: 106 MMOL/L (ref 98–107)
CK MB SERPL-MCNC: 1.2 NG/ML (ref ?–6.6)
CK SERPL-CCNC: 280 U/L (ref 30–200)
CO2 SERPL-SCNC: 23 MMOL/L (ref 21–32)
CREAT SERPL-MCNC: 1.07 MG/DL (ref 0.6–1.3)
EOSINOPHIL # BLD AUTO: 0.3 10^3/UL (ref 0–0.3)
EOSINOPHIL NFR BLD AUTO: 5 % (ref 0–10)
FIBRINOGEN PPP-MCNC: CLEAR MG/DL
GFR SERPLBLD BASED ON 1.73 SQ M-ARVRAT: > 60 ML/MIN
GLUCOSE SERPL-MCNC: 96 MG/DL (ref 70–105)
GLUCOSE UR STRIP-MCNC: NEGATIVE MG/DL
HCT VFR BLD CALC: 43 % (ref 40–54)
HGB BLD-MCNC: 14.1 G/DL (ref 13.3–17.7)
INR PPP: 1 (ref 0.8–1.4)
KETONES UR QL STRIP: NEGATIVE
LEUKOCYTE ESTERASE UR QL STRIP: NEGATIVE
LIPASE SERPL-CCNC: 13 U/L (ref 8–78)
LYMPHOCYTES # BLD AUTO: 1.5 10^3/UL (ref 1–4)
LYMPHOCYTES NFR BLD AUTO: 24 % (ref 12–44)
MAGNESIUM SERPL-MCNC: 2.2 MG/DL (ref 1.6–2.4)
MANUAL DIFFERENTIAL PERFORMED BLD QL: NO
MCH RBC QN AUTO: 32 PG (ref 25–34)
MCHC RBC AUTO-ENTMCNC: 33 G/DL (ref 32–36)
MCV RBC AUTO: 98 FL (ref 80–99)
MONOCYTES # BLD AUTO: 0.5 10^3/UL (ref 0–1)
MONOCYTES NFR BLD AUTO: 8 % (ref 0–12)
NEUTROPHILS # BLD AUTO: 3.7 10^3/UL (ref 1.8–7.8)
NEUTROPHILS NFR BLD AUTO: 61 % (ref 42–75)
NITRITE UR QL STRIP: NEGATIVE
PH UR STRIP: 7 [PH] (ref 5–9)
PLATELET # BLD: 277 10^3/UL (ref 130–400)
PMV BLD AUTO: 9 FL (ref 9–12.2)
POTASSIUM SERPL-SCNC: 3.6 MMOL/L (ref 3.6–5)
PROT SERPL-MCNC: 6.5 GM/DL (ref 6.4–8.2)
PROT UR QL STRIP: NEGATIVE
PROTHROMBIN TIME: 13.1 SEC (ref 12.2–14.7)
RBC #/AREA URNS HPF: (no result) /HPF
SODIUM SERPL-SCNC: 139 MMOL/L (ref 135–145)
SP GR UR STRIP: 1.01 (ref 1.02–1.02)
SQUAMOUS #/AREA URNS HPF: (no result) /HPF
WBC # BLD AUTO: 6.1 10^3/UL (ref 4.3–11)
WBC #/AREA URNS HPF: (no result) /HPF

## 2020-11-27 PROCEDURE — 82150 ASSAY OF AMYLASE: CPT

## 2020-11-27 PROCEDURE — 83690 ASSAY OF LIPASE: CPT

## 2020-11-27 PROCEDURE — 83880 ASSAY OF NATRIURETIC PEPTIDE: CPT

## 2020-11-27 PROCEDURE — 71045 X-RAY EXAM CHEST 1 VIEW: CPT

## 2020-11-27 PROCEDURE — 70450 CT HEAD/BRAIN W/O DYE: CPT

## 2020-11-27 PROCEDURE — 85730 THROMBOPLASTIN TIME PARTIAL: CPT

## 2020-11-27 PROCEDURE — 82550 ASSAY OF CK (CPK): CPT

## 2020-11-27 PROCEDURE — 82553 CREATINE MB FRACTION: CPT

## 2020-11-27 PROCEDURE — 81000 URINALYSIS NONAUTO W/SCOPE: CPT

## 2020-11-27 PROCEDURE — 85610 PROTHROMBIN TIME: CPT

## 2020-11-27 PROCEDURE — 36415 COLL VENOUS BLD VENIPUNCTURE: CPT

## 2020-11-27 PROCEDURE — 85025 COMPLETE CBC W/AUTO DIFF WBC: CPT

## 2020-11-27 PROCEDURE — 83735 ASSAY OF MAGNESIUM: CPT

## 2020-11-27 PROCEDURE — 93041 RHYTHM ECG TRACING: CPT

## 2020-11-27 PROCEDURE — 80053 COMPREHEN METABOLIC PANEL: CPT

## 2020-11-27 PROCEDURE — 84484 ASSAY OF TROPONIN QUANT: CPT

## 2020-11-27 PROCEDURE — 83874 ASSAY OF MYOGLOBIN: CPT

## 2020-11-27 NOTE — DIAGNOSTIC IMAGING REPORT
EXAM: CHEST 1 VIEW, AP/PA ONLY



INDICATION: Chest pain.



COMPARISON: Chest radiograph 11/09/2020.



FINDINGS: Normal heart size and pulmonary vascularity. No dense

consolidation, pleural effusion or pneumothorax. No acute osseous

findings. No significant change.



IMPRESSION: No acute cardiopulmonary findings.



Dictated by: 



  Dictated on workstation # [id number]

## 2020-11-27 NOTE — DIAGNOSTIC IMAGING REPORT
PROCEDURE: CT head without contrast.



TECHNIQUE: Multiple contiguous axial images were obtained through

the brain without the use of intravenous contrast. Auto Exposure

Controls were utilized during the CT exam to meet ALARA standards

for radiation dose reduction. 



INDICATION:  Left-sided numbness. 



COMPARISON: None.



FINDINGS: No intracranial hemorrhage, mass effect, hydrocephalus

or extra-axial fluid collections. No CT evidence of a territorial

infarction. Small air-fluid level in the left maxillary sinus is

partially visualized. The mastoids are clear. Osseous structures

are intact.



IMPRESSION: 

1. No acute intracranial CT findings.

2. Small air-fluid level in the left maxillary sinus is partially

visualized.



Dictated by: 



  Dictated on workstation # [id number]

## 2020-11-27 NOTE — ED CHEST PAIN
General


Chief Complaint:  Cardiac/General Problems


Stated Complaint:  CP


Nursing Triage Note:  


COMPLAINT OF PAIN STARTED AT MIDNIGHT STATES LEFT ARM PAIN.


Nursing Sepsis Screen:  No Definite Risk


Source:  patient


Exam Limitations:  no limitations





History of Present Illness


Date Seen by Provider:  2020


Time Seen by Provider:  06:00


Initial Comments


Patient arrives ER by EMS from home with chief complaint that he started having 

some chest pain at 11:30 last night. He went to bed and tried to sleep but he 

said the pain was too great. He did not take anything for it. He states the year

ago he was in [place] at [place] via [place] and had a small stroke. He has

no lingering deficits. Did not follow up with any neurologist afterwards. Does 

not have a cardiologist. No history of heart disease coronary angiograms. He 

does not have diabetes but claims he has hypoglycemia sometimes. He denies 

hypertension, hyperlipidemia or primary family history of early-onset coronary 

disease. He does smoke 5 cigarettes a day and occasionally drinks beer. He bayron

es any recreational drug use. He does take Zyprexa for his PTSD. He also claims 

that he started having numbness all over the left side of his body left face 

left arm and left leg at the same time. He does not take any other medications 

besides the Zyprexa. He did not try anything for the chest pain. EMS gave him 

aspirin en route and a dose of nitroglycerin which did nothing for his symptoms.

Patient is under the care of Mike Echeverria at [facility].





Allergies and Home Medications


Allergies


Coded Allergies:  


     Sulfa (Sulfonamide Antibiotics) (Verified  Allergy, Severe, HIVES, 20)


     levofloxacin (Verified  Allergy, Unknown, 20)


     doxycycline (Verified  Adverse Reaction, Mild, Vomiting, 20)





Home Medications


Azithromycin 500 Mg Tablet, 500 MG PO DAILY


   Prescribed by: RIDDHI VAUGHN on 20 1354


Cefuroxime Axetil 250 Mg Tablet, 250 MG PO BID


   Prescribed by: BRYAN AGUILAR on 20 1210


Guaifenesin/Dextromethorphan 1 Each Tbmp.12hr, 1 EACH PO BID


   Prescribed by: RIDDHI VAUGHN on 20 1354


Olanzapine 5 Mg Tablet, 5 MG PO DAILY


   Prescribed by: BRYAN AGUILAR on 20 1210


Prednisone 20 Mg Tab, 40 MG PO DAILY


   Prescribed by: BRYAN AGUILAR on 20 1210





Patient Home Medication List


Home Medication List Reviewed:  Yes





Review of Systems


Review of Systems


Constitutional:  No chills, No fever


EENTM:  No Blurred Vision, No Double Vision


Respiratory:  Cough (baseline); Denies Shortness of Air


Cardiovascular:  See HPI, Chest Pain; Denies Palpitations, Denies Syncope


Gastrointestinal:  Denies Abdominal Pain, Denies Constipated, Denies Diarrhea, 

Denies Nausea


Genitourinary:  Denies Burning, Denies Discharge


Musculoskeletal:  No back pain, No joint pain





All Other Systems Reviewed


Negative Unless Noted:  Yes





Past Medical-Social-Family Hx


Patient Social History


Alcohol Use:  Occasionally Uses


Alcohol Beverage of Choice:  Beer


Recreational Drug Use:  No


Smoking Status:  Current Everyday Smoker


Type Used:  Cigarettes


2nd Hand Smoke Exposure:  Yes


Recent Foreign Travel:  No


Contact w/Someone Who Travel:  No


Recent Infectious Disease Expo:  No


Recent Hopitalizations:  No


Physical Abuse:  No


Sexual Abuse:  No


Mistreated:  No


Fear:  No





Immunizations Up To Date


Date of Influenza Vaccine:  Oct 1, 2020





Past Medical History


Surgeries:  Yes (KIDNEY STONES--BASKET REMOVAL, )


Tonsillectomy, Vasectomy


Respiratory:  No


Cardiac:  No


Neurological:  No


Reproductive Disorders:  No


Genitourinary:  Yes (EPIDIDYMITIS)


Kidney Stones


Gastrointestinal:  No


Musculoskeletal:  Yes


Chronic Back Pain


Endocrine:  Yes (hypoglycemia)


HEENT:  Yes (TONSILLECTOMY)


Tonsilitis


Cancer:  No


Psychosocial:  Yes


Anxiety, PTSD


Integumentary:  No


Blood Disorders:  No





Physical Exam


Vital Signs





Vital Signs - First Documented








 20





 05:42


 


Pulse 54


 


Resp 18


 


B/P (MAP) 127/93 (104)


 


Pulse Ox 98


 


O2 Delivery Room Air





Capillary Refill : Less Than 3 Seconds


Height, Weight, BMI


Height: 6'"


Weight: 242lbs. oz. 109.747221op; 32.00 BMI


Method:Stated


General Appearance:  Anxious, Mild Distress


HEENT:  PERRL/EOMI, Pharynx Normal, Moist Mucous Membranes


Neck:  Full Range of Motion, Normal Inspection


Respiratory:  Chest Non Tender, Lungs Clear, Normal Breath Sounds, No Accessory 

Muscle Use, No Respiratory Distress


Cardiovascular:  Regular Rate, Rhythm, Normal Peripheral Pulses


Gastrointestinal:  Normal Bowel Sounds, Non Tender, Soft


Extremity:  Normal Capillary Refill, Normal Inspection, Non Tender, No Pedal 

Edema


Neurologic/Psychiatric:  Alert, Oriented x3, Sensory Deficit (decreased 

sensation left side of his face left arm. States he cannot feel anything in his 

left leg but when distracted he does follow commands to move his left leg with 

his eyes closed by tapping his left leg only)


Skin:  Normal Color, Warm/Dry





Progress/Results/Core Measures


Results/Orders


Lab Results





Laboratory Tests








Test


 20


05:45 20


07:40 20


08:18 Range/Units


 


 


White Blood Count


 6.1 


 


 


 4.3-11.0


10^3/uL


 


Red Blood Count


 4.40 


 


 


 4.30-5.52


10^6/uL


 


Hemoglobin 14.1    13.3-17.7  g/dL


 


Hematocrit 43    40-54  %


 


Mean Corpuscular Volume 98    80-99  fL


 


Mean Corpuscular Hemoglobin 32    25-34  pg


 


Mean Corpuscular Hemoglobin


Concent 33 


 


 


 32-36  g/dL





 


Red Cell Distribution Width 13.2    10.0-14.5  %


 


Platelet Count


 277 


 


 


 130-400


10^3/uL


 


Mean Platelet Volume 9.0    9.0-12.2  fL


 


Immature Granulocyte % (Auto) 0     %


 


Neutrophils (%) (Auto) 61    42-75  %


 


Lymphocytes (%) (Auto) 24    12-44  %


 


Monocytes (%) (Auto) 8    0-12  %


 


Eosinophils (%) (Auto) 5    0-10  %


 


Basophils (%) (Auto) 1    0-10  %


 


Neutrophils # (Auto)


 3.7 


 


 


 1.8-7.8


10^3/uL


 


Lymphocytes # (Auto)


 1.5 


 


 


 1.0-4.0


10^3/uL


 


Monocytes # (Auto)


 0.5 


 


 


 0.0-1.0


10^3/uL


 


Eosinophils # (Auto)


 0.3 


 


 


 0.0-0.3


10^3/uL


 


Basophils # (Auto)


 0.1 


 


 


 0.0-0.1


10^3/uL


 


Immature Granulocyte # (Auto)


 0.0 


 


 


 0.0-0.1


10^3/uL


 


Prothrombin Time 13.1    12.2-14.7  SEC


 


INR Comment 1.0    0.8-1.4  


 


Activated Partial


Thromboplast Time 31 


 


 


 24-35  SEC





 


Sodium Level 139    135-145  MMOL/L


 


Potassium Level 3.6    3.6-5.0  MMOL/L


 


Chloride Level 106      MMOL/L


 


Carbon Dioxide Level 23    21-32  MMOL/L


 


Anion Gap 10    5-14  MMOL/L


 


Blood Urea Nitrogen 9    7-18  MG/DL


 


Creatinine


 1.07 


 


 


 0.60-1.30


MG/DL


 


Estimat Glomerular Filtration


Rate > 60 


 


 


  





 


BUN/Creatinine Ratio 8     


 


Glucose Level 96      MG/DL


 


Calcium Level 8.2 L   8.5-10.1  MG/DL


 


Corrected Calcium 8.3 L   8.5-10.1  MG/DL


 


Magnesium Level 2.2    1.6-2.4  MG/DL


 


Total Bilirubin 0.8    0.1-1.0  MG/DL


 


Aspartate Amino Transf


(AST/SGOT) 18 


 


 


 5-34  U/L





 


Alanine Aminotransferase


(ALT/SGPT) 25 


 


 


 0-55  U/L





 


Alkaline Phosphatase 77      U/L


 


Total Creatine Kinase 280 H     U/L


 


Creatine Kinase MB 1.2    <6.6  NG/ML


 


Myoglobin


 52.4 


 


 


 10.0-92.0


NG/ML


 


Troponin I < 0.028   < 0.028  <0.028  NG/ML


 


B-Type Natriuretic Peptide 45.0    <100.0  PG/ML


 


Total Protein 6.5    6.4-8.2  GM/DL


 


Albumin 3.9    3.2-4.5  GM/DL


 


Amylase Level 39      U/L


 


Lipase 13    8-78  U/L


 


Urine Color  YELLOW    


 


Urine Clarity  CLEAR    


 


Urine pH  7.0   5-9  


 


Urine Specific Gravity  1.010 L  1.016-1.022  


 


Urine Protein  NEGATIVE   NEGATIVE  


 


Urine Glucose (UA)  NEGATIVE   NEGATIVE  


 


Urine Ketones  NEGATIVE   NEGATIVE  


 


Urine Nitrite  NEGATIVE   NEGATIVE  


 


Urine Bilirubin  NEGATIVE   NEGATIVE  


 


Urine Urobilinogen  1.0   < = 1.0  MG/DL


 


Urine Leukocyte Esterase  NEGATIVE   NEGATIVE  


 


Urine RBC (Auto)  NEGATIVE   NEGATIVE  


 


Urine RBC  NONE    /HPF


 


Urine WBC  RARE    /HPF


 


Urine Squamous Epithelial


Cells 


 NONE 


 


  /HPF





 


Urine Crystals  NONE    /LPF


 


Urine Bacteria  NEGATIVE    /HPF


 


Urine Casts  NONE    /LPF


 


Urine Mucus  NEGATIVE    /LPF


 


Urine Culture Indicated  NO    








My Orders





Orders - PRESTON ROD


Ct Head Wo (20 06:14)


Morphine  Injection (Morphine  Injection (20 07:34)


Lorazepam Injection (Ativan Injection) (20 08:15)


Troponin I (20 08:12)





Medications Given in ED





Current Medications








 Medications  Dose


 Ordered  Sig/Duy


 Route  Start Time


 Stop Time Status Last Admin


Dose Admin


 


 Lorazepam  0.5 mg  ONCE  ONCE


 IVP  20 08:15


 20 08:16 DC 20 08:16


0.5 MG








Vital Signs/I&O











 20





 05:42


 


Pulse 54


 


Resp 18


 


B/P (MAP) 127/93 (104)


 


Pulse Ox 98


 


O2 Delivery Room Air














Blood Pressure Mean:                    104











Progress


Progress Note #1:  


   Time:  06:27


Progress Note


Patient has response to light tapping of his left leg when he is distracted with

his eyes closed casting some doubt on the totality of the numbness. His 

paresthesias could be related to intracranial pathology versus 

anxiety/conversion. Plan to scan his head but he is well outside the window if 

he was having a stroke. Chest pain did not respond to nitroglycerin. Normal EKG.


Progress Note #2:  


   Time:  08:08


Progress Note


Suspect the patient's symptoms may be more related to anxiety certainly give him

half a milligram IV Ativan and reassess him. We will repeat a troponin now which

would be 2.5 hours after his first one.


He says his numbness and neurologic symptoms have resolved. Still having the 

chest pain and the morphine while he felt it did not help his chest pain.


Progress Note #3:  


   Time:  09:25


Progress Note


The patient's second troponin is still negative. He has total resolution of 

symptoms with Ativan. We are going to allow him to transport home with 

instructions to follow-up with [facility]ne and discuss psychiatric referral. We have 

also given referral to cardiology for outpatient workup. He is in agreement with

this plan.


Initial ECG Impression Date:  2020


Initial ECG Impression Time:  05:45


Initial ECG Rate:  49


Initial ECG Rhythm:  Normal Sinus


Initial ECG Intervals:  Normal


Initial ECG Impression:  Normal


Comment


Physiologic sinus bradycardia without clinically relevant ST changes.





Diagnostic Imaging





   Diagonstic Imaging:  Xray


   Plain Films/CT/US/NM/MRI:  chest


Comments


                 [facility] [place], [place]





NAME:   DALLASTABATHA W 


MED REC#:   [id number]


ACCOUNT#:   [id number]


PT STATUS:   REG ER


:   1980


PHYSICIAN:   RIDDHI VAUGHN DO


ADMIT DATE:   20/ER


                                   ***Draft***


Date of Exam:20





CHEST 1 VIEW, AP/PA ONLY








EXAM: CHEST 1 VIEW, AP/PA ONLY





INDICATION: Chest pain.





COMPARISON: Chest radiograph 2020.





FINDINGS: Normal heart size and pulmonary vascularity. No dense


consolidation, pleural effusion or pneumothorax. No acute osseous


findings. No significant change.





IMPRESSION: No acute cardiopulmonary findings.





  Dictated on workstation # [id number]








Dict:   [id number] [id number]


Trans:   [id number]


[facility] [id number]





Interpreted by:     PATY CARLOS MD


Electronically signed by:


   Reviewed:  Reviewed by Me








   Diagonstic Imaging:  CT


   Plain Films/CT/US/NM/MRI:  c-spine, head


Comments


                 [facility] [place], [place]





NAME:   TABATHA HAYNES 


MED REC#:   [id number]


ACCOUNT#:   [id number]


PT STATUS:   REG ER


:   1980


PHYSICIAN:   PRESTON ROD MD


ADMIT DATE:   20/ER


                                   ***Draft***


Date of Exam:20





CT HEAD WO








PROCEDURE: CT head without contrast.





TECHNIQUE: Multiple contiguous axial images were obtained through


the brain without the use of intravenous contrast. Auto Exposure


Controls were utilized during the CT exam to meet ALARA standards


for radiation dose reduction. 





INDICATION:  Left-sided numbness. 





COMPARISON: None.





FINDINGS: No intracranial hemorrhage, mass effect, hydrocephalus


or extra-axial fluid collections. No CT evidence of a territorial


infarction. Small air-fluid level in the left maxillary sinus is


partially visualized. The mastoids are clear. Osseous structures


are intact.





IMPRESSION: 


1. No acute intracranial CT findings.


2. Small air-fluid level in the left maxillary sinus is partially


visualized.





  Dictated on workstation # [id number]








Dict:   20 [id number]


Trans:   [id number]


[facility] [id number]





Interpreted by:     PATY CARLOS MD


Electronically signed by:


   Reviewed:  Reviewed by Me


Consults :  


   Consulting Physician:  BRENDA STONE MD


Consults Notes


Heart score 2 points. Low risk; 0.9-1.7% 30-day MACE. 


We discussed the persistent chest pain and bradycardia is probably physiologic. 

He says he agrees there could be something going on but it does not sound like 

it is cardiac related today. He would recommend seeing the patient in the clinic

next week early.





Departure


Impression





   Primary Impression:  


   Chest pain


   Qualified Codes:  R07.9 - Chest pain, unspecified


   Additional Impressions:  


   Complaint of paresthesia


   History of anxiety disorder


Disposition:  01 HOME, SELF-CARE


Condition:  Stable





Departure-Patient Inst.


Decision time for Depature:  09:26


Referrals:  


[facility]


Primary Care Physician





BRENDA STONE MD


Patient Instructions:  Chest Pain (DC), Paresthesia (DC)





Add. Discharge Instructions:  


We could not find anything dangerous about your chest pain today however there 

may be something going on with your heart that can be discovered by the 

cardiologist.


I would like to call Dr. Stone's clinic Monday morning and request early follow-

up.


Return to the ER if you have recurring, persistent chest pain, shortness of air 

or other worrisome symptoms.


Talk to your primary care doctor about a referral to psychiatry to discuss 

medication management.


All discharge instructions reviewed with patient and/or family. Voiced 

understanding.


Work/School Note:  Work Release Form   Date Seen in the Emergency Department:  

2020


   Return to Work:  2020


   Restrictions:  No Restrictions





Copy


Copies To 1:   YEE PATEL DO; BRENDA STONE MD, TITUS J                 2020 06:21

## 2020-11-28 ENCOUNTER — HOSPITAL ENCOUNTER (EMERGENCY)
Dept: HOSPITAL 75 - ER | Age: 40
Discharge: HOME | End: 2020-11-28
Payer: MEDICARE

## 2020-11-28 VITALS — DIASTOLIC BLOOD PRESSURE: 92 MMHG | SYSTOLIC BLOOD PRESSURE: 139 MMHG

## 2020-11-28 VITALS — HEIGHT: 72.01 IN | WEIGHT: 223.11 LBS | BODY MASS INDEX: 30.22 KG/M2

## 2020-11-28 DIAGNOSIS — F17.210: ICD-10-CM

## 2020-11-28 DIAGNOSIS — L02.11: Primary | ICD-10-CM

## 2020-11-28 DIAGNOSIS — Z88.2: ICD-10-CM

## 2020-11-28 DIAGNOSIS — Z88.1: ICD-10-CM

## 2020-11-28 DIAGNOSIS — Z79.52: ICD-10-CM

## 2020-11-28 DIAGNOSIS — F41.9: ICD-10-CM

## 2020-11-28 PROCEDURE — 99284 EMERGENCY DEPT VISIT MOD MDM: CPT

## 2020-11-28 PROCEDURE — 87205 SMEAR GRAM STAIN: CPT

## 2020-11-28 PROCEDURE — 87070 CULTURE OTHR SPECIMN AEROBIC: CPT

## 2020-11-28 NOTE — ED GENERAL
General


Chief Complaint:  Bite-Animal/Human/Insect


Stated Complaint:  BUG BITE - NECK


Nursing Triage Note:  


PT AMBULATE TO TRIAGE WITH C/O POSSIBLE BUG BITE TO RIGHT SIDE NECK. PT REPORTS 


PAIN TO RIGHT SIDE HEAD AND NECK.


Nursing Sepsis Screen:  No Definite Risk





History of Present Illness


Date Seen by Provider:  Nov 28, 2020


Time Seen by Provider:  20:20


Initial Comments


This is a healthy-appearing 40-year-old male who presents to the ER with 

complaints of right-sided neck pain from what he believes to be an insect bite. 

States he has been having increasing pain and tenderness on the right side of 

his neck for 2 days, however, it is worse today. Rates 10/10, sharp, constant 

and localized to the raised area on his neck. Intermittent episodes of nausea 

without emesis. States he has been taking Tylenol and ibuprofen without relief. 

Denies fevers, chills, cough, shortness of breath, vomiting, abdominal pain.





Allergies and Home Medications


Allergies


Coded Allergies:  


     Sulfa (Sulfonamide Antibiotics) (Verified  Allergy, Severe, HIVES, 11/4/20)


     levofloxacin (Verified  Allergy, Unknown, 9/2/20)


     doxycycline (Verified  Adverse Reaction, Mild, Vomiting, 9/2/20)





Home Medications


Azithromycin 500 Mg Tablet, 500 MG PO DAILY


   Prescribed by: RIDDHI VAUGHN on 11/9/20 1354


Cefuroxime Axetil 250 Mg Tablet, 250 MG PO BID


   Prescribed by: BRYAN AGUILAR on 11/4/20 1210


Cephalexin 500 Mg Tablet, 500 MG PO TID


   Prescribed by: CARMELA ALBERTO on 11/28/20 2036


Guaifenesin/Dextromethorphan 1 Each Tbmp.12hr, 1 EACH PO BID


   Prescribed by: RIDDHI VAUGHN on 11/9/20 1354


Olanzapine 5 Mg Tablet, 5 MG PO DAILY


   Prescribed by: BRYAN AGUILAR on 11/4/20 1210


Prednisone 20 Mg Tab, 40 MG PO DAILY


   Prescribed by: BRYAN AGUILAR on 11/4/20 1210





Patient Home Medication List


Home Medication List Reviewed:  Yes





Review of Systems


Review of Systems


Constitutional:  no symptoms reported


EENTM:  see HPI


Respiratory:  no symptoms reported


Cardiovascular:  no symptoms reported


Gastrointestinal:  no symptoms reported


Genitourinary:  no symptoms reported


Musculoskeletal:  no symptoms reported


Skin:  see HPI


Psychiatric/Neurological:  No Symptoms Reported


Hematologic/Lymphatic:  No Symptoms Reported


Immunological/Allergic:  no symptoms reported





Past Medical-Social-Family Hx


Patient Social History


Alcohol Use:  Occasionally Uses


Number of Drinks Today:  AA


Alcohol Beverage of Choice:  Beer


Recreational Drug Use:  No


Smoking Status:  Current Everyday Smoker


Type Used:  Cigarettes


2nd Hand Smoke Exposure:  Yes


Recent Foreign Travel:  No


Contact w/Someone Who Travel:  No


Recent Infectious Disease Expo:  No


Recent Hopitalizations:  No


Physical Abuse:  No


Sexual Abuse:  No


Mistreated:  No


Fear:  No





Immunizations Up To Date


Date of Influenza Vaccine:  Oct 1, 2020





Past Medical History


Surgeries:  Yes (KIDNEY STONES--BASKET REMOVAL, )


Tonsillectomy, Vasectomy


Respiratory:  No


Cardiac:  No


Neurological:  No


Reproductive Disorders:  No


Genitourinary:  Yes (EPIDIDYMITIS)


Kidney Stones


Gastrointestinal:  No


Musculoskeletal:  Yes


Chronic Back Pain


Endocrine:  Yes (hypoglycemia)


HEENT:  Yes (TONSILLECTOMY)


Tonsilitis


Cancer:  No


Psychosocial:  Yes


Anxiety, PTSD


Integumentary:  No


Blood Disorders:  No





Physical Exam


Vital Signs





Vital Signs - First Documented








 11/28/20





 20:09


 


Temp 36.7


 


Pulse 67


 


Resp 17


 


B/P (MAP) 139/92 (108)


 


O2 Delivery Room Air





Capillary Refill : Less Than 3 Seconds


Height, Weight, BMI


Height: 6'"


Weight: 242lbs. oz. 109.431191dl; 30.00 BMI


Method:Stated


General Appearance:  No Apparent Distress, WD/WN


Eyes:  Bilateral Eye Normal Inspection, Bilateral Eye PERRL, Bilateral Eye EOMI


HEENT:  PERRL/EOMI, TMs Normal, Pharynx Normal


Neck:  Full Range of Motion, Supple, Other (small 1.5 x 1 cm abscess on right 

side of neck, fluctuant)


Respiratory:  Lungs Clear, Normal Breath Sounds


Cardiovascular:  Regular Rate, Rhythm, No Murmur


Gastrointestinal:  Normal Bowel Sounds, Non Tender, Soft


Neurologic/Psychiatric:  Alert, Oriented x3, No Motor/Sensory Deficits, Normal 

Mood/Affect


Skin:  Normal Color, Warm/Dry


Lymphatic:  No Adenopathy





Progress/Results/Core Measures


Suspected Sepsis


Recent Fever Within 48 Hours:  No


Infection Criteria Present:  None


New/Unexplained  Altered Menta:  No


Sepsis Screen:  No Definite Risk


SIRS


Temperature: 


Pulse: 67 


Respiratory Rate: 17


 


Blood Pressure 139 /92 


Mean: 108





Results/Orders


My Orders





Orders - CARMELA ALBERTO


Wound Culture (11/28/20 20:28)


Cephalexin Capsule (Keflex Capsule) (11/28/20 20:30)


Ketorolac Injection (Toradol Injection) (11/28/20 20:30)


Cephalexin Capsule (Keflex Capsule) (11/28/20 20:45)


Rx-Ondansetron Po (Rx-Zofran Po) (11/28/20 20:34)





Medications Given in ED





Current Medications








 Medications  Dose


 Ordered  Sig/Duy


 Route  Start Time


 Stop Time Status Last Admin


Dose Admin


 


 Cephalexin HCl  500 mg  ONCE  ONCE


 PO  11/28/20 20:45


 11/28/20 20:46 DC 11/28/20 20:40


500 MG


 


 Ketorolac


 Tromethamine  30 mg  ONCE  ONCE


 IM  11/28/20 20:30


 11/28/20 20:31 DC 11/28/20 20:39


30 MG


 


 Ondansetron HCl  4 mg  STK-MED ONCE


 .ROUTE  11/28/20 20:34


 11/28/20 20:37 DC 11/28/20 20:42


4 MG








Vital Signs/I&O











 11/28/20





 20:09


 


Temp 36.7


 


Pulse 67


 


Resp 17


 


B/P (MAP) 139/92 (108)


 


O2 Delivery Room Air





Capillary Refill : Less Than 3 Seconds








Blood Pressure Mean:                    108








Progress Note :  


Progress Note


Small 1.5 cm x 1 cm abscess noted on the right side of his neck. Upon arrival 

was rating pain 10/10. However, he was relaxed, resting in bed during 

examination. Area was fluctuant, with white head. Was able to open with pressure

and express moderate amount of white/gray purulent discharge, culture obtained. 

Site cleansed with alcohol, covered with RAND. Tolerated procedure well. Reviewed

discharge plan of care with him and he is agreeable with plan.





First dose of Keflex in ED, Rx provided for Keflex instead of Bactrim due to 

sulfa allergy.





Departure


Impression





   Primary Impression:  


   Abscess


Disposition:  01 HOME, SELF-CARE


Condition:  Improved





Departure-Patient Inst.


Decision time for Depature:  20:31


Referrals:  


NO,LOCAL PHYSICIAN (PCP/Family)


Primary Care Physician


Patient Instructions:  Abscess Drainage, Percutaneous (DC)





Add. Discharge Instructions:  


Plan: 


1. May use ice 20 minutes at a time as needed for discomfort. 


2. Tylenol or Ibuprofen as needed per package for pain. 


3. Keflex 500mg by mouth three times a day as directed. Complete full course. 


4. May take Zofran 4mg by mouth every 4 hours as needed for nausea. 


5. Return for any new, concerning, or worsening symptoms. 





All discharge instructions reviewed with patient and/or family. Voiced 

understanding.


Scripts


Cephalexin (Cephalexin) 500 Mg Tablet


500 MG PO TID for 7 Days, #20 TAB 0 Refills


   Prov: CARMELA ALBERTO         11/28/20











CARMELA ALBERTO           Nov 28, 2020 20:33

## 2020-12-07 ENCOUNTER — HOSPITAL ENCOUNTER (EMERGENCY)
Dept: HOSPITAL 75 - ER | Age: 40
Discharge: HOME | End: 2020-12-07
Payer: MEDICARE

## 2020-12-07 VITALS — HEIGHT: 71.65 IN | WEIGHT: 222.67 LBS | BODY MASS INDEX: 30.49 KG/M2

## 2020-12-07 VITALS — DIASTOLIC BLOOD PRESSURE: 72 MMHG | SYSTOLIC BLOOD PRESSURE: 117 MMHG

## 2020-12-07 DIAGNOSIS — Z88.2: ICD-10-CM

## 2020-12-07 DIAGNOSIS — Z77.22: ICD-10-CM

## 2020-12-07 DIAGNOSIS — Z88.1: ICD-10-CM

## 2020-12-07 DIAGNOSIS — Z20.828: ICD-10-CM

## 2020-12-07 DIAGNOSIS — R07.9: ICD-10-CM

## 2020-12-07 DIAGNOSIS — R07.89: Primary | ICD-10-CM

## 2020-12-07 LAB
ALBUMIN SERPL-MCNC: 3.9 GM/DL (ref 3.2–4.5)
ALP SERPL-CCNC: 75 U/L (ref 40–136)
ALT SERPL-CCNC: 22 U/L (ref 0–55)
APTT BLD: 30 SEC (ref 24–35)
BASOPHILS # BLD AUTO: 0.1 10^3/UL (ref 0–0.1)
BASOPHILS NFR BLD AUTO: 1 % (ref 0–10)
BILIRUB SERPL-MCNC: 0.5 MG/DL (ref 0.1–1)
BUN/CREAT SERPL: 5
CALCIUM SERPL-MCNC: 8.3 MG/DL (ref 8.5–10.1)
CHLORIDE SERPL-SCNC: 106 MMOL/L (ref 98–107)
CO2 SERPL-SCNC: 23 MMOL/L (ref 21–32)
CREAT SERPL-MCNC: 1.02 MG/DL (ref 0.6–1.3)
EOSINOPHIL # BLD AUTO: 0.2 10^3/UL (ref 0–0.3)
EOSINOPHIL NFR BLD AUTO: 3 % (ref 0–10)
GFR SERPLBLD BASED ON 1.73 SQ M-ARVRAT: > 60 ML/MIN
GLUCOSE SERPL-MCNC: 100 MG/DL (ref 70–105)
HCT VFR BLD CALC: 42 % (ref 40–54)
HGB BLD-MCNC: 13.9 G/DL (ref 13.3–17.7)
INR PPP: 1 (ref 0.8–1.4)
LYMPHOCYTES # BLD AUTO: 1.1 10^3/UL (ref 1–4)
LYMPHOCYTES NFR BLD AUTO: 18 % (ref 12–44)
MAGNESIUM SERPL-MCNC: 2.1 MG/DL (ref 1.6–2.4)
MANUAL DIFFERENTIAL PERFORMED BLD QL: NO
MCH RBC QN AUTO: 33 PG (ref 25–34)
MCHC RBC AUTO-ENTMCNC: 33 G/DL (ref 32–36)
MCV RBC AUTO: 98 FL (ref 80–99)
MONOCYTES # BLD AUTO: 0.4 10^3/UL (ref 0–1)
MONOCYTES NFR BLD AUTO: 7 % (ref 0–12)
NEUTROPHILS # BLD AUTO: 4.3 10^3/UL (ref 1.8–7.8)
NEUTROPHILS NFR BLD AUTO: 71 % (ref 42–75)
PLATELET # BLD: 288 10^3/UL (ref 130–400)
PMV BLD AUTO: 9.3 FL (ref 9–12.2)
POTASSIUM SERPL-SCNC: 3.7 MMOL/L (ref 3.6–5)
PROT SERPL-MCNC: 6.5 GM/DL (ref 6.4–8.2)
PROTHROMBIN TIME: 14 SEC (ref 12.2–14.7)
SODIUM SERPL-SCNC: 140 MMOL/L (ref 135–145)
WBC # BLD AUTO: 6.1 10^3/UL (ref 4.3–11)

## 2020-12-07 PROCEDURE — 36415 COLL VENOUS BLD VENIPUNCTURE: CPT

## 2020-12-07 PROCEDURE — 85610 PROTHROMBIN TIME: CPT

## 2020-12-07 PROCEDURE — 71045 X-RAY EXAM CHEST 1 VIEW: CPT

## 2020-12-07 PROCEDURE — 83735 ASSAY OF MAGNESIUM: CPT

## 2020-12-07 PROCEDURE — 85025 COMPLETE CBC W/AUTO DIFF WBC: CPT

## 2020-12-07 PROCEDURE — 80053 COMPREHEN METABOLIC PANEL: CPT

## 2020-12-07 PROCEDURE — 93005 ELECTROCARDIOGRAM TRACING: CPT

## 2020-12-07 PROCEDURE — 84484 ASSAY OF TROPONIN QUANT: CPT

## 2020-12-07 PROCEDURE — 86141 C-REACTIVE PROTEIN HS: CPT

## 2020-12-07 PROCEDURE — 83874 ASSAY OF MYOGLOBIN: CPT

## 2020-12-07 PROCEDURE — 85730 THROMBOPLASTIN TIME PARTIAL: CPT

## 2020-12-07 PROCEDURE — 87635 SARS-COV-2 COVID-19 AMP PRB: CPT

## 2020-12-07 PROCEDURE — 87804 INFLUENZA ASSAY W/OPTIC: CPT

## 2020-12-07 NOTE — ED CHEST PAIN
General


Stated Complaint:  CP


Source:  patient, EMS


Exam Limitations:  no limitations





History of Present Illness


Date Seen by Provider:  Dec 7, 2020


Time Seen by Provider:  08:28


Initial Comments


40-year-old male presents with chest pain. Patient reports it started around 7. 

Patient works at the [employer], he was moving boxes when the pain started. 

Reports that he's got pain in his left chest wall that has been constant for 

about the last hour or hour and a half. He has some numbness to his left side of

his face numbness to left arm. Reports maybe some mild shortness of breath, 

cough, feels chilled. He reports that those symptoms just started in route. 

Patient was seen with similar symptoms about a week ago and it was recommended a

follow-up with Dr. Ferro for an outpatient stress test. Patient reports he's 

called but has not heard back to schedule the stress test.. Patient reports he 

had a stroke around a year ago and was in the hospital for 14 days.





Allergies and Home Medications


Allergies


Coded Allergies:  


     Sulfa (Sulfonamide Antibiotics) (Verified  Allergy, Severe, HIVES, 11/4/20)


     levofloxacin (Verified  Allergy, Unknown, 9/2/20)


     doxycycline (Verified  Adverse Reaction, Mild, Vomiting, 9/2/20)





Patient Home Medication List


Home Medication List Reviewed:  Yes





Review of Systems


Review of Systems


Constitutional:  chills; No dizziness, No fever; malaise


Respiratory:  Cough; Denies Shortness of Air


Cardiovascular:  Chest Pain; Denies Irregular Heart Rate


Gastrointestinal:  Denies Abdominal Pain, Denies Diarrhea, Denies Nausea, Denies

Vomiting


Genitourinary:  No Symptoms Reported


Musculoskeletal:  see HPI


Skin:  no symptoms reported


Psychiatric/Neurological:  No Symptoms Reported


Endocrine:  No Symptoms Reported





Past Medical-Social-Family Hx


Past Med/Social Hx:  Reviewed Nursing Past Med/Soc Hx


Patient Social History


Alcohol Beverage of Choice:  Beer


Type Used:  Cigarettes


2nd Hand Smoke Exposure:  Yes


Recent Hopitalizations:  No





Immunizations Up To Date


Date of Influenza Vaccine:  Oct 1, 2020





Past Medical History


Surgeries:  Yes (KIDNEY STONES--BASKET REMOVAL, )


Tonsillectomy, Vasectomy


Respiratory:  No


Cardiac:  No


Neurological:  No


Reproductive Disorders:  No


Genitourinary:  Yes (EPIDIDYMITIS)


Kidney Stones


Gastrointestinal:  No


Musculoskeletal:  Yes


Chronic Back Pain


Endocrine:  Yes (hypoglycemia)


HEENT:  Yes (TONSILLECTOMY)


Tonsilitis


Cancer:  No


Psychosocial:  Yes


Anxiety, PTSD


Integumentary:  No


Blood Disorders:  No





Physical Exam


Vital Signs





Vital Signs - First Documented








 12/7/20





 08:26


 


Temp 36.0


 


Pulse 56


 


Resp 14


 


B/P (MAP) 138/88 (105)


 


Pulse Ox 98


 


O2 Delivery Room Air





Capillary Refill :


Height, Weight, BMI


Height: 6'"


Weight: 242lbs. oz. 109.250786zr; 30.00 BMI


Method:Stated


General Appearance:  No Apparent Distress, WD/WN


Neck:  Supple, Tender Lateral (left lateral neck, posterior trapezious, TTP )


Respiratory:  Lungs Clear, Normal Breath Sounds, No Accessory Muscle Use, No 

Respiratory Distress, Other (anterior left chest wall, TTP )


Cardiovascular:  Regular Rate, Rhythm, No Edema


Gastrointestinal:  Non Tender, Soft


Extremity:  Normal Capillary Refill, Normal Range of Motion


Neurologic/Psychiatric:  Alert, Oriented x3, Normal Mood/Affect, CNs II-XII Norm

as Tested


Skin:  Normal Color, Warm/Dry; No Diaphoresis





Progress/Results/Core Measures


Results/Orders


Lab Results





Laboratory Tests








Test


 12/7/20


08:36 12/7/20


08:40 12/7/20


10:18 Range/Units


 


 


White Blood Count


 6.1 


 


 


 4.3-11.0


10^3/uL


 


Red Blood Count


 4.28 L


 


 


 4.30-5.52


10^6/uL


 


Hemoglobin 13.9    13.3-17.7  g/dL


 


Hematocrit 42    40-54  %


 


Mean Corpuscular Volume 98    80-99  fL


 


Mean Corpuscular Hemoglobin 33    25-34  pg


 


Mean Corpuscular Hemoglobin


Concent 33 


 


 


 32-36  g/dL





 


Red Cell Distribution Width 12.9    10.0-14.5  %


 


Platelet Count


 288 


 


 


 130-400


10^3/uL


 


Mean Platelet Volume 9.3    9.0-12.2  fL


 


Immature Granulocyte % (Auto) 0     %


 


Neutrophils (%) (Auto) 71    42-75  %


 


Lymphocytes (%) (Auto) 18    12-44  %


 


Monocytes (%) (Auto) 7    0-12  %


 


Eosinophils (%) (Auto) 3    0-10  %


 


Basophils (%) (Auto) 1    0-10  %


 


Neutrophils # (Auto)


 4.3 


 


 


 1.8-7.8


10^3/uL


 


Lymphocytes # (Auto)


 1.1 


 


 


 1.0-4.0


10^3/uL


 


Monocytes # (Auto)


 0.4 


 


 


 0.0-1.0


10^3/uL


 


Eosinophils # (Auto)


 0.2 


 


 


 0.0-0.3


10^3/uL


 


Basophils # (Auto)


 0.1 


 


 


 0.0-0.1


10^3/uL


 


Immature Granulocyte # (Auto)


 0.0 


 


 


 0.0-0.1


10^3/uL


 


Prothrombin Time 14.0    12.2-14.7  SEC


 


INR Comment 1.0    0.8-1.4  


 


Activated Partial


Thromboplast Time 30 


 


 


 24-35  SEC





 


Sodium Level 140    135-145  MMOL/L


 


Potassium Level 3.7    3.6-5.0  MMOL/L


 


Chloride Level 106      MMOL/L


 


Carbon Dioxide Level 23    21-32  MMOL/L


 


Anion Gap 11    5-14  MMOL/L


 


Blood Urea Nitrogen 5 L   7-18  MG/DL


 


Creatinine


 1.02 


 


 


 0.60-1.30


MG/DL


 


Estimat Glomerular Filtration


Rate > 60 


 


 


  





 


BUN/Creatinine Ratio 5     


 


Glucose Level 100      MG/DL


 


Calcium Level 8.3 L   8.5-10.1  MG/DL


 


Corrected Calcium 8.4 L   8.5-10.1  MG/DL


 


Magnesium Level 2.1    1.6-2.4  MG/DL


 


Total Bilirubin 0.5    0.1-1.0  MG/DL


 


Aspartate Amino Transf


(AST/SGOT) 20 


 


 


 5-34  U/L





 


Alanine Aminotransferase


(ALT/SGPT) 22 


 


 


 0-55  U/L





 


Alkaline Phosphatase 75      U/L


 


Myoglobin


 79.0 


 


 


 10.0-92.0


NG/ML


 


Troponin I < 0.028   < 0.028  <0.028  NG/ML


 


C-Reactive Protein High


Sensitivity 0.27 


 


 


 0.00-0.50


MG/DL


 


Total Protein 6.5    6.4-8.2  GM/DL


 


Albumin 3.9    3.2-4.5  GM/DL


 


Coronavirus 2019 ([facility])  Negative   Negative  








Micro Results





Microbiology


12/7/20 Influenza Types A,B Antigen (JOSE EDUARDO) - Final, Complete


          





My Orders





Orders - REGINA CONDE DO


Cbc With Automated Diff (12/7/20 08:28)


Magnesium (12/7/20 08:28)


Chest 1 View, Ap/Pa Only (12/7/20 08:28)


Ekg Tracing (12/7/20 08:28)


Comprehensive Metabolic Panel (12/7/20 08:28)


Myoglobin Serum (12/7/20 08:28)


Protime With Inr (12/7/20 08:28)


Partial Thromboplastin Time (12/7/20 08:28)


Ed Iv/Invasive Line Start (12/7/20 08:28)


Troponin I (12/7/20 08:28)


Hs C Reactive Protein (12/7/20 08:28)


Influenza A And B Antigens (12/7/20 08:28)


Covid 19 Inhouse Test (12/7/20 08:28)


Ketorolac Injection (Toradol Injection) (12/7/20 08:28)


Coronavirus Sars-Cov-2 So 2019 (12/7/20 08:40)


Orphenadrine Inj (Ed Only) (Norflex Inje (12/7/20 09:33)


Troponin I (12/7/20 10:15)





Medications Given in ED





Current Medications








 Medications  Dose


 Ordered  Sig/Duy


 Route  Start Time


 Stop Time Status Last Admin


Dose Admin


 


 Orphenadrine


 Citrate  60 mg  STK-MED ONCE


 .ROUTE  12/7/20 09:33


 12/7/20 09:36 DC 12/7/20 09:41


60 MG








Vital Signs/I&O











 12/7/20 12/7/20





 08:26 08:26


 


Temp  36.0


 


Pulse  56


 


Resp  14


 


B/P (MAP)  138/88 (105)


 


Pulse Ox  98


 


O2 Delivery Room Air Room Air











Progress


Progress Note :  


   Time:  10:57


Progress Note


Patient with negative EKG, negative troponin 2. Patient symptoms are much more 

consistent with a overuse/strain versus cardiac pain. Pain is reproducible and 

the chest wall. I recommend however he continues to follow-up with cardiologist 

and obtain an outpatient stress test. I will prescribe him meloxicam and 

Flexeril. Patient stable only discharged home


Initial ECG Impression Date:  Dec 7, 2020


Initial ECG Impression Time:  08:32


Initial ECG Rate:  56


Initial ECG Rhythm:  Normal Sinus


Initial ECG Intervals:  Normal


Initial ECG Impression:  Normal


Initial ECG Comparisson:  Unchanged


Comment


HR 56, NSR, no acute findings





Departure


Impression





   Primary Impression:  


   Chest wall pain


   Additional Impression:  


   Chest pain


   Qualified Codes:  R07.9 - Chest pain, unspecified


Disposition:  01 HOME, SELF-CARE


Condition:  Stable





Departure-Patient Inst.


Referrals:  


NO,LOCAL PHYSICIAN (PCP/Family)


Primary Care Physician


Patient Instructions:  Chest Pain That Is Not Caused by the Heart (DC), Chest 

Pain





Add. Discharge Instructions:  


Please follow up with cardiologist and schedule a stress test as recommended and

her previous visit


Scripts


Meloxicam (Meloxicam) 7.5 Mg Tablet


7.5 MG PO DAILY, #10 TAB


   Prov: REGINA CONDE DO         12/7/20 


Cyclobenzaprine HCl (Cyclobenzaprine HCl) 10 Mg Tablet


10 MG PO Q8H PRN for SPASMS, #15 TAB 0 Refills


   Prov: REGINA CONDE DO         12/7/20











REGINA CONDE DO                Dec 7, 2020 08:37

## 2020-12-08 ENCOUNTER — HOSPITAL ENCOUNTER (EMERGENCY)
Dept: HOSPITAL 75 - ER | Age: 40
Discharge: HOME | End: 2020-12-08
Payer: MEDICARE

## 2020-12-08 VITALS — BODY MASS INDEX: 30.25 KG/M2 | HEIGHT: 71.97 IN | WEIGHT: 223.33 LBS

## 2020-12-08 VITALS — SYSTOLIC BLOOD PRESSURE: 125 MMHG | DIASTOLIC BLOOD PRESSURE: 75 MMHG

## 2020-12-08 DIAGNOSIS — Z88.2: ICD-10-CM

## 2020-12-08 DIAGNOSIS — Z77.22: ICD-10-CM

## 2020-12-08 DIAGNOSIS — Z88.1: ICD-10-CM

## 2020-12-08 DIAGNOSIS — R07.9: Primary | ICD-10-CM

## 2020-12-08 LAB
ALBUMIN SERPL-MCNC: 4 GM/DL (ref 3.2–4.5)
ALP SERPL-CCNC: 85 U/L (ref 40–136)
ALT SERPL-CCNC: 25 U/L (ref 0–55)
APTT BLD: 29 SEC (ref 24–35)
BASOPHILS # BLD AUTO: 0.1 10^3/UL (ref 0–0.1)
BASOPHILS NFR BLD AUTO: 1 % (ref 0–10)
BILIRUB SERPL-MCNC: 0.2 MG/DL (ref 0.1–1)
BUN/CREAT SERPL: 6
CALCIUM SERPL-MCNC: 8.3 MG/DL (ref 8.5–10.1)
CHLORIDE SERPL-SCNC: 106 MMOL/L (ref 98–107)
CO2 SERPL-SCNC: 22 MMOL/L (ref 21–32)
CREAT SERPL-MCNC: 0.95 MG/DL (ref 0.6–1.3)
EOSINOPHIL # BLD AUTO: 0.3 10^3/UL (ref 0–0.3)
EOSINOPHIL NFR BLD AUTO: 6 % (ref 0–10)
GFR SERPLBLD BASED ON 1.73 SQ M-ARVRAT: > 60 ML/MIN
GLUCOSE SERPL-MCNC: 97 MG/DL (ref 70–105)
HCT VFR BLD CALC: 43 % (ref 40–54)
HGB BLD-MCNC: 14.2 G/DL (ref 13.3–17.7)
INR PPP: 1 (ref 0.8–1.4)
LYMPHOCYTES # BLD AUTO: 1.5 10^3/UL (ref 1–4)
LYMPHOCYTES NFR BLD AUTO: 23 % (ref 12–44)
MAGNESIUM SERPL-MCNC: 2.2 MG/DL (ref 1.6–2.4)
MANUAL DIFFERENTIAL PERFORMED BLD QL: NO
MCH RBC QN AUTO: 32 PG (ref 25–34)
MCHC RBC AUTO-ENTMCNC: 33 G/DL (ref 32–36)
MCV RBC AUTO: 98 FL (ref 80–99)
MONOCYTES # BLD AUTO: 0.4 10^3/UL (ref 0–1)
MONOCYTES NFR BLD AUTO: 7 % (ref 0–12)
NEUTROPHILS # BLD AUTO: 3.9 10^3/UL (ref 1.8–7.8)
NEUTROPHILS NFR BLD AUTO: 63 % (ref 42–75)
PLATELET # BLD: 281 10^3/UL (ref 130–400)
PMV BLD AUTO: 8.7 FL (ref 9–12.2)
POTASSIUM SERPL-SCNC: 3.5 MMOL/L (ref 3.6–5)
PROT SERPL-MCNC: 6.7 GM/DL (ref 6.4–8.2)
PROTHROMBIN TIME: 13.3 SEC (ref 12.2–14.7)
SODIUM SERPL-SCNC: 140 MMOL/L (ref 135–145)
WBC # BLD AUTO: 6.2 10^3/UL (ref 4.3–11)

## 2020-12-08 PROCEDURE — 93041 RHYTHM ECG TRACING: CPT

## 2020-12-08 PROCEDURE — 85025 COMPLETE CBC W/AUTO DIFF WBC: CPT

## 2020-12-08 PROCEDURE — 80053 COMPREHEN METABOLIC PANEL: CPT

## 2020-12-08 PROCEDURE — 36415 COLL VENOUS BLD VENIPUNCTURE: CPT

## 2020-12-08 PROCEDURE — 85610 PROTHROMBIN TIME: CPT

## 2020-12-08 PROCEDURE — 83880 ASSAY OF NATRIURETIC PEPTIDE: CPT

## 2020-12-08 PROCEDURE — 83874 ASSAY OF MYOGLOBIN: CPT

## 2020-12-08 PROCEDURE — 84484 ASSAY OF TROPONIN QUANT: CPT

## 2020-12-08 PROCEDURE — 82962 GLUCOSE BLOOD TEST: CPT

## 2020-12-08 PROCEDURE — 93005 ELECTROCARDIOGRAM TRACING: CPT

## 2020-12-08 PROCEDURE — 85730 THROMBOPLASTIN TIME PARTIAL: CPT

## 2020-12-08 PROCEDURE — 83735 ASSAY OF MAGNESIUM: CPT

## 2020-12-08 PROCEDURE — 71045 X-RAY EXAM CHEST 1 VIEW: CPT

## 2020-12-08 NOTE — ED CHEST PAIN
General


Stated Complaint:  SOA/CHEST PAIN/DIZZINESS/NECK & L SIDE PAIN


Source:  patient


Exam Limitations:  no limitations





History of Present Illness


Date Seen by Provider:  Dec 8, 2020


Time Seen by Provider:  16:40


Initial Comments


To ER with persistent left-sided neck pain and left-sided chest pain.  This is 

been intermittent since yesterday.  Worsened by deep breathing and movement.


Timing/Duration:  intermittent


Severity/Quality:  moderate


Activities at Onset:  none


ASA po PTA:  No


NTG SL PTA:  No





Allergies and Home Medications


Allergies


Coded Allergies:  


     Sulfa (Sulfonamide Antibiotics) (Verified  Allergy, Severe, HIVES, 11/4/20)


     levofloxacin (Verified  Allergy, Unknown, 9/2/20)


     doxycycline (Verified  Adverse Reaction, Mild, Vomiting, 9/2/20)





Home Medications


Cyclobenzaprine HCl 10 Mg Tablet, 10 MG PO Q8H PRN for SPASMS


   Prescribed by: REGINA CONDE on 12/7/20 1101


Meloxicam 7.5 Mg Tablet, 7.5 MG PO DAILY


   Prescribed by: REGINA CONDE on 12/7/20 1101





Patient Home Medication List


Home Medication List Reviewed:  Yes





Review of Systems


Review of Systems


Constitutional:  see HPI


EENTM:  No Symptoms Reported


Respiratory:  No Symptoms Reported


Cardiovascular:  See HPI, Chest Pain


Gastrointestinal:  No Symptoms Reported


Genitourinary:  No Symptoms Reported


Musculoskeletal:  no symptoms reported


Skin:  no symptoms reported


Psychiatric/Neurological:  No Symptoms Reported


Endocrine:  No Symptoms Reported


Hematologic/Lymphatic:  No Symptoms Reported





Past Medical-Social-Family Hx


Patient Social History


Alcohol Beverage of Choice:  Beer


Type Used:  Cigarettes


2nd Hand Smoke Exposure:  Yes


Recent Foreign Travel:  No


Contact w/Someone Who Travel:  No


Recent Hopitalizations:  No





Immunizations Up To Date


Date of Influenza Vaccine:  Oct 1, 2020





Past Medical History


Surgeries:  Yes (KIDNEY STONES--BASKET REMOVAL, )


Tonsillectomy, Vasectomy


Respiratory:  No


Cardiac:  No


Neurological:  No


Reproductive Disorders:  No


Genitourinary:  Yes (EPIDIDYMITIS)


Kidney Stones


Gastrointestinal:  No


Musculoskeletal:  Yes


Chronic Back Pain


Endocrine:  Yes (hypoglycemia)


HEENT:  Yes (TONSILLECTOMY)


Tonsilitis


Cancer:  No


Psychosocial:  Yes


Anxiety, PTSD


Integumentary:  No


Blood Disorders:  No





Physical Exam


Vital Signs





Vital Signs - First Documented








 12/8/20





 16:34


 


Temp 36.5


 


Pulse 76


 


Resp 18


 


B/P (MAP) 146/99 (115)


 


Pulse Ox 95


 


O2 Delivery Room Air





Capillary Refill :


Height, Weight, BMI


Height: 6'"


Weight: 242lbs. oz. 109.179238hl; 30.00 BMI


Method:Stated


General Appearance:  No Apparent Distress, WD/WN


HEENT:  PERRL/EOMI, TMs Normal


Neck:  Full Range of Motion, Normal Inspection


Respiratory:  Normal Breath Sounds, No Accessory Muscle Use, No Respiratory 

Distress


Cardiovascular:  Regular Rate, Rhythm, Normal Peripheral Pulses


Gastrointestinal:  Non Tender, Soft


Extremity:  Normal Capillary Refill, Normal Inspection


Neurologic/Psychiatric:  Alert, Oriented x3, No Motor/Sensory Deficits


Skin:  Normal Color, Warm/Dry





Progress/Results/Core Measures


Results/Orders


Lab Results





Laboratory Tests








Test


 12/8/20


16:47 12/8/20


17:10 Range/Units


 


 


Glucometer 118 H    MG/DL


 


White Blood Count


 


 6.2 


 4.3-11.0


10^3/uL


 


Red Blood Count


 


 4.39 


 4.30-5.52


10^6/uL


 


Hemoglobin  14.2  13.3-17.7  g/dL


 


Hematocrit  43  40-54  %


 


Mean Corpuscular Volume  98  80-99  fL


 


Mean Corpuscular Hemoglobin  32  25-34  pg


 


Mean Corpuscular Hemoglobin


Concent 


 33 


 32-36  g/dL





 


Red Cell Distribution Width  12.6  10.0-14.5  %


 


Platelet Count


 


 281 


 130-400


10^3/uL


 


Mean Platelet Volume  8.7 L 9.0-12.2  fL


 


Immature Granulocyte % (Auto)  0   %


 


Neutrophils (%) (Auto)  63  42-75  %


 


Lymphocytes (%) (Auto)  23  12-44  %


 


Monocytes (%) (Auto)  7  0-12  %


 


Eosinophils (%) (Auto)  6  0-10  %


 


Basophils (%) (Auto)  1  0-10  %


 


Neutrophils # (Auto)


 


 3.9 


 1.8-7.8


10^3/uL


 


Lymphocytes # (Auto)


 


 1.5 


 1.0-4.0


10^3/uL


 


Monocytes # (Auto)


 


 0.4 


 0.0-1.0


10^3/uL


 


Eosinophils # (Auto)


 


 0.3 


 0.0-0.3


10^3/uL


 


Basophils # (Auto)


 


 0.1 


 0.0-0.1


10^3/uL


 


Immature Granulocyte # (Auto)


 


 0.0 


 0.0-0.1


10^3/uL


 


Prothrombin Time  13.3  12.2-14.7  SEC


 


INR Comment  1.0  0.8-1.4  


 


Activated Partial


Thromboplast Time 


 29 


 24-35  SEC





 


Sodium Level  140  135-145  MMOL/L


 


Potassium Level  3.5 L 3.6-5.0  MMOL/L


 


Chloride Level  106    MMOL/L


 


Carbon Dioxide Level  22  21-32  MMOL/L


 


Anion Gap  12  5-14  MMOL/L


 


Blood Urea Nitrogen  6 L 7-18  MG/DL


 


Creatinine


 


 0.95 


 0.60-1.30


MG/DL


 


Estimat Glomerular Filtration


Rate 


 > 60 


  





 


BUN/Creatinine Ratio  6   


 


Glucose Level  97    MG/DL


 


Calcium Level  8.3 L 8.5-10.1  MG/DL


 


Corrected Calcium  8.3 L 8.5-10.1  MG/DL


 


Magnesium Level  2.2  1.6-2.4  MG/DL


 


Total Bilirubin  0.2  0.1-1.0  MG/DL


 


Aspartate Amino Transf


(AST/SGOT) 


 19 


 5-34  U/L





 


Alanine Aminotransferase


(ALT/SGPT) 


 25 


 0-55  U/L





 


Alkaline Phosphatase  85    U/L


 


Myoglobin


 


 49.7 


 10.0-92.0


NG/ML


 


Troponin I  < 0.028  <0.028  NG/ML


 


B-Type Natriuretic Peptide  36.5  <100.0  PG/ML


 


Total Protein  6.7  6.4-8.2  GM/DL


 


Albumin  4.0  3.2-4.5  GM/DL








My Orders





Orders - BRYAN AGUILAR APRN


Cbc With Automated Diff (12/8/20 16:34)


Magnesium (12/8/20 16:34)


Chest 1 View, Ap/Pa Only (12/8/20 16:34)


Ekg Tracing (12/8/20 16:34)


Comprehensive Metabolic Panel (12/8/20 16:34)


Myoglobin Serum (12/8/20 16:34)


Protime With Inr (12/8/20 16:34)


Partial Thromboplastin Time (12/8/20 16:34)


O2 (12/8/20 16:34)


Monitor-Rhythm Ecg Trace Only (12/8/20 16:34)


Lipid Panel (12/9/20 06:00)


Ed Iv/Invasive Line Start (12/8/20 16:34)


BNP (12/8/20 16:34)


Troponin I (12/8/20 16:34)


Aspirin Chewable Tablet (Baby Aspirin Ch (12/8/20 16:45)


Ketorolac Injection (Toradol Injection) (12/8/20 16:45)


Hydrocodone/Apap 5/325 Tablet (Lortab 5 (12/8/20 18:00)





Medications Given in ED





Current Medications








 Medications  Dose


 Ordered  Sig/Duy


 Route  Start Time


 Stop Time Status Last Admin


Dose Admin


 


 Aspirin  324 mg  ONCE  ONCE


 PO  12/8/20 16:45


 12/8/20 16:46 DC 12/8/20 17:04


324 MG


 


 Ketorolac


 Tromethamine  15 mg  ONCE  ONCE


 IVP  12/8/20 16:45


 12/8/20 16:46 DC 12/8/20 17:04


15 MG








Vital Signs/I&O











 12/8/20





 16:34


 


Temp 36.5


 


Pulse 76


 


Resp 18


 


B/P (MAP) 146/99 (115)


 


Pulse Ox 95


 


O2 Delivery Room Air











Departure


Impression





   Primary Impression:  


   Chest pain


Disposition:  01 HOME, SELF-CARE


Condition:  Stable





Departure-Patient Inst.


Decision time for Depature:  17:43


Referrals:  


[facility] (PCP)


Primary Care Physician








HENOK JULIAN (Family)


Primary Care Physician





DELL ROSEN MD FACP FACC CCDS





BRENDA HIRSCH MD


Patient Instructions:  Chest Pain





Add. Discharge Instructions:  


1.  Call one of the cardiologist to make an appointment to be seen.  If that 

appointment is 2 or 3 weeks down the road that is fine, go ahead and make it.


Work/School Note:  Work Release Form   Date Seen in the Emergency Department:  

Dec 8, 2020


   Return to Work:  Dec 9, 2020


      Other Restrictions Listed Below:  no lifting over 5 lbs x7 days.











BRYAN AGUILAR APRN              Dec 8, 2020 16:42

## 2020-12-08 NOTE — DIAGNOSTIC IMAGING REPORT
INDICATION: Chest wall pain.



TIME OF EXAM: 05:22 p.m.



COMPARISON: Correlation is made with prior chest from one day

earlier.



The heart size is normal. The pulmonary vascularity is

unremarkable. The lungs are clear. No infiltrate, effusion or

pneumothorax is detected.



IMPRESSION: No acute cardiopulmonary process is detected.



Dictated by: 



  Dictated on workstation # [id number]

## 2020-12-14 ENCOUNTER — HOSPITAL ENCOUNTER (OUTPATIENT)
Dept: HOSPITAL 75 - ER | Age: 40
Setting detail: OBSERVATION
LOS: 1 days | Discharge: HOME | End: 2020-12-15
Attending: FAMILY MEDICINE | Admitting: INTERNAL MEDICINE
Payer: MEDICARE

## 2020-12-14 VITALS — WEIGHT: 229.5 LBS | HEIGHT: 72.01 IN | BODY MASS INDEX: 31.08 KG/M2

## 2020-12-14 DIAGNOSIS — Z88.2: ICD-10-CM

## 2020-12-14 DIAGNOSIS — M54.5: ICD-10-CM

## 2020-12-14 DIAGNOSIS — F43.10: ICD-10-CM

## 2020-12-14 DIAGNOSIS — F41.9: ICD-10-CM

## 2020-12-14 DIAGNOSIS — Z88.8: ICD-10-CM

## 2020-12-14 DIAGNOSIS — R07.9: Primary | ICD-10-CM

## 2020-12-14 DIAGNOSIS — F17.210: ICD-10-CM

## 2020-12-14 DIAGNOSIS — G89.29: ICD-10-CM

## 2020-12-14 DIAGNOSIS — Z88.1: ICD-10-CM

## 2020-12-14 DIAGNOSIS — Z79.899: ICD-10-CM

## 2020-12-14 DIAGNOSIS — Z86.73: ICD-10-CM

## 2020-12-14 LAB
ALBUMIN SERPL-MCNC: 4.2 GM/DL (ref 3.2–4.5)
ALP SERPL-CCNC: 100 U/L (ref 40–136)
ALT SERPL-CCNC: 34 U/L (ref 0–55)
AMORPH SED URNS QL MICRO: (no result) /LPF
AMYLASE SERPL-CCNC: 52 U/L (ref 25–125)
APTT BLD: 30 SEC (ref 24–35)
APTT PPP: YELLOW S
BACTERIA #/AREA URNS HPF: NEGATIVE /HPF
BARBITURATES UR QL: NEGATIVE
BASOPHILS # BLD AUTO: 0.1 10^3/UL (ref 0–0.1)
BASOPHILS NFR BLD AUTO: 1 % (ref 0–10)
BENZODIAZ UR QL SCN: NEGATIVE
BILIRUB SERPL-MCNC: 0.3 MG/DL (ref 0.1–1)
BILIRUB UR QL STRIP: NEGATIVE
BUN/CREAT SERPL: 7
CALCIUM SERPL-MCNC: 8.7 MG/DL (ref 8.5–10.1)
CHLORIDE SERPL-SCNC: 103 MMOL/L (ref 98–107)
CK MB SERPL-MCNC: 1.1 NG/ML (ref ?–6.6)
CK SERPL-CCNC: 169 U/L (ref 30–200)
CO2 SERPL-SCNC: 24 MMOL/L (ref 21–32)
COCAINE UR QL: NEGATIVE
CREAT SERPL-MCNC: 1.03 MG/DL (ref 0.6–1.3)
D DIMER PPP FEU-MCNC: < 0.27 UG/ML (ref 0–0.49)
EOSINOPHIL # BLD AUTO: 0.4 10^3/UL (ref 0–0.3)
EOSINOPHIL NFR BLD AUTO: 5 % (ref 0–10)
ERYTHROCYTE [SEDIMENTATION RATE] IN BLOOD: 8 MM/HR (ref 0–15)
FIBRINOGEN PPP-MCNC: (no result) MG/DL
GFR SERPLBLD BASED ON 1.73 SQ M-ARVRAT: > 60 ML/MIN
GLUCOSE SERPL-MCNC: 106 MG/DL (ref 70–105)
GLUCOSE UR STRIP-MCNC: NEGATIVE MG/DL
HCT VFR BLD CALC: 46 % (ref 40–54)
HGB BLD-MCNC: 15.2 G/DL (ref 13.3–17.7)
INR PPP: 1 (ref 0.8–1.4)
KETONES UR QL STRIP: NEGATIVE
LEUKOCYTE ESTERASE UR QL STRIP: NEGATIVE
LIPASE SERPL-CCNC: 31 U/L (ref 8–78)
LYMPHOCYTES # BLD AUTO: 2.1 10^3/UL (ref 1–4)
LYMPHOCYTES NFR BLD AUTO: 29 % (ref 12–44)
MAGNESIUM SERPL-MCNC: 2 MG/DL (ref 1.6–2.4)
MANUAL DIFFERENTIAL PERFORMED BLD QL: NO
MCH RBC QN AUTO: 32 PG (ref 25–34)
MCHC RBC AUTO-ENTMCNC: 33 G/DL (ref 32–36)
MCV RBC AUTO: 98 FL (ref 80–99)
METHADONE UR QL SCN: NEGATIVE
METHAMPHETAMINE SCREEN URINE S: NEGATIVE
MONOCYTES # BLD AUTO: 0.6 10^3/UL (ref 0–1)
MONOCYTES NFR BLD AUTO: 8 % (ref 0–12)
NEUTROPHILS # BLD AUTO: 4.2 10^3/UL (ref 1.8–7.8)
NEUTROPHILS NFR BLD AUTO: 57 % (ref 42–75)
NITRITE UR QL STRIP: NEGATIVE
OPIATES UR QL SCN: NEGATIVE
OXYCODONE UR QL: NEGATIVE
PH UR STRIP: 7 [PH] (ref 5–9)
PLATELET # BLD: 289 10^3/UL (ref 130–400)
PMV BLD AUTO: 9.3 FL (ref 9–12.2)
POTASSIUM SERPL-SCNC: 3.4 MMOL/L (ref 3.6–5)
PROPOXYPH UR QL: NEGATIVE
PROT SERPL-MCNC: 7.4 GM/DL (ref 6.4–8.2)
PROT UR QL STRIP: NEGATIVE
PROTHROMBIN TIME: 13.3 SEC (ref 12.2–14.7)
RBC #/AREA URNS HPF: (no result) /HPF
SODIUM SERPL-SCNC: 138 MMOL/L (ref 135–145)
SP GR UR STRIP: 1.02 (ref 1.02–1.02)
SQUAMOUS #/AREA URNS HPF: (no result) /HPF
TRICYCLICS UR QL SCN: NEGATIVE
WBC # BLD AUTO: 7.3 10^3/UL (ref 4.3–11)
WBC #/AREA URNS HPF: (no result) /HPF

## 2020-12-14 PROCEDURE — 83874 ASSAY OF MYOGLOBIN: CPT

## 2020-12-14 PROCEDURE — 80306 DRUG TEST PRSMV INSTRMNT: CPT

## 2020-12-14 PROCEDURE — 93041 RHYTHM ECG TRACING: CPT

## 2020-12-14 PROCEDURE — 81000 URINALYSIS NONAUTO W/SCOPE: CPT

## 2020-12-14 PROCEDURE — 70498 CT ANGIOGRAPHY NECK: CPT

## 2020-12-14 PROCEDURE — 80320 DRUG SCREEN QUANTALCOHOLS: CPT

## 2020-12-14 PROCEDURE — 82550 ASSAY OF CK (CPK): CPT

## 2020-12-14 PROCEDURE — 83735 ASSAY OF MAGNESIUM: CPT

## 2020-12-14 PROCEDURE — 83615 LACTATE (LD) (LDH) ENZYME: CPT

## 2020-12-14 PROCEDURE — 87635 SARS-COV-2 COVID-19 AMP PRB: CPT

## 2020-12-14 PROCEDURE — 71045 X-RAY EXAM CHEST 1 VIEW: CPT

## 2020-12-14 PROCEDURE — 99284 EMERGENCY DEPT VISIT MOD MDM: CPT

## 2020-12-14 PROCEDURE — 87804 INFLUENZA ASSAY W/OPTIC: CPT

## 2020-12-14 PROCEDURE — 85730 THROMBOPLASTIN TIME PARTIAL: CPT

## 2020-12-14 PROCEDURE — 82150 ASSAY OF AMYLASE: CPT

## 2020-12-14 PROCEDURE — 36415 COLL VENOUS BLD VENIPUNCTURE: CPT

## 2020-12-14 PROCEDURE — 80053 COMPREHEN METABOLIC PANEL: CPT

## 2020-12-14 PROCEDURE — 87081 CULTURE SCREEN ONLY: CPT

## 2020-12-14 PROCEDURE — 83880 ASSAY OF NATRIURETIC PEPTIDE: CPT

## 2020-12-14 PROCEDURE — 93458 L HRT ARTERY/VENTRICLE ANGIO: CPT

## 2020-12-14 PROCEDURE — 70450 CT HEAD/BRAIN W/O DYE: CPT

## 2020-12-14 PROCEDURE — 97161 PT EVAL LOW COMPLEX 20 MIN: CPT

## 2020-12-14 PROCEDURE — 85025 COMPLETE CBC W/AUTO DIFF WBC: CPT

## 2020-12-14 PROCEDURE — 85652 RBC SED RATE AUTOMATED: CPT

## 2020-12-14 PROCEDURE — 82553 CREATINE MB FRACTION: CPT

## 2020-12-14 PROCEDURE — 70496 CT ANGIOGRAPHY HEAD: CPT

## 2020-12-14 PROCEDURE — 84484 ASSAY OF TROPONIN QUANT: CPT

## 2020-12-14 PROCEDURE — 84145 PROCALCITONIN (PCT): CPT

## 2020-12-14 PROCEDURE — 83690 ASSAY OF LIPASE: CPT

## 2020-12-14 PROCEDURE — 86141 C-REACTIVE PROTEIN HS: CPT

## 2020-12-14 PROCEDURE — 85610 PROTHROMBIN TIME: CPT

## 2020-12-14 PROCEDURE — 93005 ELECTROCARDIOGRAM TRACING: CPT

## 2020-12-14 PROCEDURE — 85379 FIBRIN DEGRADATION QUANT: CPT

## 2020-12-15 VITALS — SYSTOLIC BLOOD PRESSURE: 103 MMHG | DIASTOLIC BLOOD PRESSURE: 76 MMHG

## 2020-12-15 VITALS — SYSTOLIC BLOOD PRESSURE: 127 MMHG | DIASTOLIC BLOOD PRESSURE: 75 MMHG

## 2020-12-15 VITALS — DIASTOLIC BLOOD PRESSURE: 75 MMHG | SYSTOLIC BLOOD PRESSURE: 127 MMHG

## 2020-12-15 VITALS — SYSTOLIC BLOOD PRESSURE: 99 MMHG | DIASTOLIC BLOOD PRESSURE: 78 MMHG

## 2020-12-15 VITALS — SYSTOLIC BLOOD PRESSURE: 91 MMHG | DIASTOLIC BLOOD PRESSURE: 77 MMHG

## 2020-12-15 VITALS — SYSTOLIC BLOOD PRESSURE: 122 MMHG | DIASTOLIC BLOOD PRESSURE: 79 MMHG

## 2020-12-15 VITALS — SYSTOLIC BLOOD PRESSURE: 105 MMHG | DIASTOLIC BLOOD PRESSURE: 73 MMHG

## 2020-12-15 VITALS — SYSTOLIC BLOOD PRESSURE: 116 MMHG | DIASTOLIC BLOOD PRESSURE: 81 MMHG

## 2020-12-15 VITALS — SYSTOLIC BLOOD PRESSURE: 116 MMHG | DIASTOLIC BLOOD PRESSURE: 80 MMHG

## 2020-12-15 VITALS — SYSTOLIC BLOOD PRESSURE: 112 MMHG | DIASTOLIC BLOOD PRESSURE: 85 MMHG

## 2020-12-15 VITALS — DIASTOLIC BLOOD PRESSURE: 68 MMHG | SYSTOLIC BLOOD PRESSURE: 92 MMHG

## 2020-12-15 VITALS — DIASTOLIC BLOOD PRESSURE: 71 MMHG | SYSTOLIC BLOOD PRESSURE: 119 MMHG

## 2020-12-15 VITALS — DIASTOLIC BLOOD PRESSURE: 75 MMHG | SYSTOLIC BLOOD PRESSURE: 117 MMHG

## 2020-12-15 VITALS — DIASTOLIC BLOOD PRESSURE: 77 MMHG | SYSTOLIC BLOOD PRESSURE: 91 MMHG

## 2020-12-15 RX ADMIN — NITROGLYCERIN PRN MG: 0.4 TABLET SUBLINGUAL at 00:46

## 2020-12-15 RX ADMIN — NITROGLYCERIN PRN MG: 0.4 TABLET SUBLINGUAL at 00:34

## 2020-12-15 RX ADMIN — MORPHINE SULFATE PRN MG: 4 INJECTION, SOLUTION INTRAMUSCULAR; INTRAVENOUS at 10:16

## 2020-12-15 RX ADMIN — MORPHINE SULFATE PRN MG: 4 INJECTION, SOLUTION INTRAMUSCULAR; INTRAVENOUS at 08:03

## 2020-12-15 RX ADMIN — NITROGLYCERIN PRN MG: 0.4 TABLET SUBLINGUAL at 00:22

## 2020-12-15 RX ADMIN — ASPIRIN SCH MG: 81 TABLET ORAL at 08:04

## 2020-12-15 RX ADMIN — ASPIRIN SCH MG: 81 TABLET ORAL at 08:03

## 2020-12-15 NOTE — DIAGNOSTIC IMAGING REPORT
PROCEDURE: CT head wo r/o stroke.



TECHNIQUE: Multiple contiguous axial images were obtained through

the brain without the use of intravenous contrast. Auto Exposure

Controls were utilized during the CT exam to meet ALARA standards

for radiation dose reduction. 



INDICATION: Left-sided weakness and chest pain.



COMPARISON: 11/27/2020



FINDINGS:



The ventricles and cortical sulci are age-appropriate. There is

no midline shift or mass effect. No acute intracranial hemorrhage

is seen. There is no CT evidence of acute territorial ischemia.

The calvarium appears intact. Visualized paranasal sinuses

demonstrate a small mucous retention cyst of the left maxillary

sinus.



IMPRESSION:

1. No acute intracranial hemorrhage or CT evidence of acute

territorial ischemia.



Findings reported to Dr. Thomas by the preliminary radiology

service at 2337 hours on 12/14/2020.



Dictated by: 



  Dictated on workstation # [id number]

## 2020-12-15 NOTE — PHYSICAL THERAPY PROGRESS NOTE
Therapy Progress Note


Patient having heart cath on this date.  PT to begin in [place] PT              Dec 15, 2020 11:49

## 2020-12-15 NOTE — ED GENERAL
General


Chief Complaint:  Chest Pain


Stated Complaint:  CP L SIDE WEAKNESS


Nursing Triage Note:  


PT ARRIVED BY [facility] WITH CHIEF COMPLAINT OF CHEST PAIN AND LEFT 


SIDE WEAKNESS. PT WAS ALERT, ORIENTED X 4 AT ARRIVAL. PT MOVED FROM THE 


STRETCHER TO THE BED. PT'S VITAL SIGNS WERE TAKEN, EKG WAS DONE AND BLOOD WAS 


DRAWN FROM IV SITE STARTED BY EMS (20 GAUGE IV IN LEFT AC). PT STATED THAT CHEST




PAIN STARTED 40 MINUTES PRIOR TO ARRIVAL WHEN HE WAS SITTING ON THE COUCH. PT 


STATED THAT IT WAS TRAVELING UP TO NECK INTO LEFT SIDE OF FACE CAUSING WEAKNESS.




PT STATED WEAKNESS STARTED AROUND 6521-2575 TOODAY WHEN PLAYING WITH HIS DOG. PT




STATED HE WAS SUPPOSED TO HAVE HEART CATH DONE TOMORROW WITH JESSIKA, BUT DUE TO 


INSURANCE THEY WANTED TO DO  A STRESS TEST FIRST. HE STATED DR. ROSEN THINKS 


HIS HEART WONT BE ABLE TO HANDLE A STRESS TEST. PT STATED HE HAD COVID SWAB DONE




FOR HEART CATH LAST WEEK HERE. PT STATED HE WAS HAVING SLURRED SPEECH, BUT THIS 


RN DOES NOT HEAR ANY SLURRED SPEECH AT ARRIVAL. PT HAD RASH ON CHEST AND THIS RN




ASKED WHAT THE RASH WAS FROM AND HE STATED IT WAS A HEAT RASH.


Nursing Sepsis Screen:  No Definite Risk


Source of Information:  Patient, Old Records





History of Present Illness


Date Seen by Provider:  Dec 14, 2020


Time Seen by Provider:  23:04


Initial Comments


PT ARRIVES VIA EMS FROM HOME


C/O CHEST PAIN THAT BEGAN 45 MINUTES AGO WHILE SITTING AND WATCHING TV


PAIN IS IN LEFT UPPER CHEST


TELLS ME THAT THERE IS NO RADIATION OF PAIN. REPORTS TO RN THAT PAIN GOES UP 

LEFT SIDE OF NECK TO LEFT SIDE OF FACE AND IT IS MAKING HIS FACE NUMB. 


NOTHING WORSENS OR IMPROVES PAIN


HAS NOT TAKEN ANYTHING FOR PAIN





+ COUGH


NO FEVER


NO SHORTNESS OF BREATH


+ NAUSEA, NO VOMITING. STATES HE HAS NOT HAD ANYTHING TO DRINK TODAY--STATES "IT

MAKES ME SICK". HOWEVER, PT HAS BEEN ABLE TO EAT, AND ATE "PIZZA OR CHIPS" 4 

HOURS AGO WITHOUT ANY PROBLEMS. 


NO ABDOMINAL PAIN 


NO DIARRHEA


NO LOSS OF TASTE OR SMELL


NO SORE THROAT





ADDITIONALLY, PT ALSO C/O NUMBNESS AND WEAKNESS TO ENTIRE LEFT SIDE OF 

BODY--LEFT SIDE OF FACE, LEFT ARM AND LEFT LEG


STATES THIS STARTED AROUND 1300 TODAY WHILE PLAYING INSIDE WITH HIS DOG. 


NO DIFFICULTY SWALLOWING


THINKS HIS SPEECH IS SLURRED, HOWEVER, SPEECH APPEARS CLEAR AND NOT SLURRED AT 

THIS TIME. 


STATES HE FEELS LIKE IT IS A LITTLE BIT DIFFICULTY TO WALK. 


THESE SYMPTOMS ARE NO DIFFERENT NOW





PT HAD STROKE ABOUT A YEAR AGO, WITH THESE EXACT SAME SYMPTOMS


STATES HE SPENT 13 DAYS IN THE HOSPITAL AT [facility]





PT WITH A MULTITUDE OF VISITS HERE--10 VISITS HERE SINCE AUGUST--VARIOUS 

COMPLAINTS


PT WAS SEEN HERE 12/07 AND 12/08 FOR CHEST PAIN


PT STATES HE WAS SUPPOSED TO HAVE A CARDIAC CATH DONE IN THE MORNING BY DR. ROSEN, BUT INSURANCE WANTED HIM TO HAVE A STRESS TEST FIRST--THIS HAS NOT BEEN 

SCHEDULED AT THIS TIME, AND DOES NOT HAVE A FOLLOW UP APPOINTMENT WITH HIM AT 

THIS TIME.





Allergies and Home Medications


Allergies


Coded Allergies:  


     Sulfa (Sulfonamide Antibiotics) (Verified  Allergy, Severe, HIVES, 11/4/20)


     levofloxacin (Verified  Allergy, Unknown, 9/2/20)


     doxycycline (Verified  Adverse Reaction, Mild, Vomiting, 9/2/20)





Home Medications


Cyclobenzaprine HCl 10 Mg Tablet, 10 MG PO Q8H PRN for SPASMS


   Prescribed by: REGINA CONDE on 12/7/20 1101


Meloxicam 7.5 Mg Tablet, 7.5 MG PO DAILY


   Prescribed by: REGINA CONDE on 12/7/20 1101





Past Medical-Social-Family Hx


Patient Social History


Alcohol Use:  Denies Use


Number of Drinks Today:  AA


Alcohol Beverage of Choice:  Beer


Recreational Drug Use:  No


Type Used:  Cigarettes


2nd Hand Smoke Exposure:  Yes


Recent Foreign Travel:  No


Contact w/Someone Who Travel:  No


Recent Infectious Disease Expo:  No


Recent Hopitalizations:  No


Physical Abuse:  No


Sexual Abuse:  No


Mistreated:  No


Fear:  No





Immunizations Up To Date


Date of Influenza Vaccine:  Oct 1, 2020





Past Medical History


Surgeries:  Yes (KIDNEY STONES--BASKET REMOVAL, )


Tonsillectomy, Vasectomy


Respiratory:  No


Cardiac:  No


Neurological:  No


Reproductive Disorders:  No


Genitourinary:  Yes (EPIDIDYMITIS)


Kidney Stones


Gastrointestinal:  No


Musculoskeletal:  Yes


Chronic Back Pain


Endocrine:  Yes (hypoglycemia)


HEENT:  Yes (TONSILLECTOMY)


Tonsilitis


Cancer:  No


Psychosocial:  Yes


Anxiety, PTSD


Integumentary:  No


Blood Disorders:  No





Physical Exam


Vital Signs





Vital Signs - First Documented








 12/14/20 12/15/20





 23:05 05:45


 


Temp 35.7 


 


Pulse 68 


 


Resp 20 


 


B/P (MAP) 145/98 (114) 


 


Pulse Ox  98


 


O2 Delivery Room Air 





Capillary Refill : Less Than 3 Seconds


Height, Weight, BMI


Height: 6'"


Weight: 242lbs. oz. [id number]; 30.00 BMI


Method:Stated


General Appearance:  No Apparent Distress, WD/WN, Other (FLAT AFFECT. )


HEENT:  PERRL/EOMI


Neck:  Full Range of Motion, Normal Inspection, Non Tender, Supple; No Carotid 

Bruit, No JVD


Respiratory:  Normal Breath Sounds, No Accessory Muscle Use, No Respiratory 

Distress, Other (TENDERNESS TO LEFT UPPER  CHEST AND LOWER STERNUM--PALPATION 

REPRODUCES PAIN)


Cardiovascular:  Regular Rate, Rhythm, No Edema, No Gallop, No JVD, No Murmur, 

Normal Peripheral Pulses


Gastrointestinal:  Normal Bowel Sounds, No Organomegaly, No Pulsatile Mass, Non 

Tender, Soft


Back:  Normal Inspection


Extremity:  Normal Capillary Refill, Normal Inspection, Normal Range of Motion, 

Non Tender, No Calf Tenderness, No Pedal Edema


Neurologic/Psychiatric:  Alert, Oriented x3, Normal Mood/Affect, CNs II-XII Norm

as Tested; No Abnormal Cerebellar Tests; Sensory Deficit (HAS SOME DECREASED 

SENSATION TO LEFT FACE, LEFT ARM AND LEFT LEG. )


Skin:  Normal Color, Warm/Dry; No Rash





Progress/Results/Core Measures


Suspected Sepsis


Recent Fever Within 48 Hours:  No


Infection Criteria Present:  None


New/Unexplained  Altered Menta:  No


Sepsis Screen:  No Definite Risk


SIRS


Temperature: 


Pulse: 68 


Respiratory Rate: 20


 


Laboratory Tests


12/14/20 23:05: White Blood Count 7.3


Blood Pressure 145 /98 


Mean: 114


 


Laboratory Tests


12/14/20 23:05: 


Creatinine 1.03, INR Comment 1.0, Platelet Count 289, Total Bilirubin 0.3








Results/Orders


Lab Results





Laboratory Tests








Test


 12/14/20


23:05 12/14/20


23:33 12/14/20


23:37 12/15/20


02:50 Range/Units


 


 


White Blood Count


 7.3 


 


 


 


 4.3-11.0


10^3/uL


 


Red Blood Count


 4.75 


 


 


 


 4.30-5.52


10^6/uL


 


Hemoglobin 15.2     13.3-17.7  g/dL


 


Hematocrit 46     40-54  %


 


Mean Corpuscular Volume 98     80-99  fL


 


Mean Corpuscular Hemoglobin 32     25-34  pg


 


Mean Corpuscular Hemoglobin


Concent 33 


 


 


 


 32-36  g/dL





 


Red Cell Distribution Width 12.4     10.0-14.5  %


 


Platelet Count


 289 


 


 


 


 130-400


10^3/uL


 


Mean Platelet Volume 9.3     9.0-12.2  fL


 


Immature Granulocyte % (Auto) 0      %


 


Neutrophils (%) (Auto) 57     42-75  %


 


Lymphocytes (%) (Auto) 29     12-44  %


 


Monocytes (%) (Auto) 8     0-12  %


 


Eosinophils (%) (Auto) 5     0-10  %


 


Basophils (%) (Auto) 1     0-10  %


 


Neutrophils # (Auto)


 4.2 


 


 


 


 1.8-7.8


10^3/uL


 


Lymphocytes # (Auto)


 2.1 


 


 


 


 1.0-4.0


10^3/uL


 


Monocytes # (Auto)


 0.6 


 


 


 


 0.0-1.0


10^3/uL


 


Eosinophils # (Auto)


 0.4 H


 


 


 


 0.0-0.3


10^3/uL


 


Basophils # (Auto)


 0.1 


 


 


 


 0.0-0.1


10^3/uL


 


Immature Granulocyte # (Auto)


 0.0 


 


 


 


 0.0-0.1


10^3/uL


 


Erythrocyte Sedimentation Rate 8     0-15  MM/HR


 


Prothrombin Time 13.3     12.2-14.7  SEC


 


INR Comment 1.0     0.8-1.4  


 


Activated Partial


Thromboplast Time 30 


 


 


 


 24-35  SEC





 


D-Dimer


 < 0.27 


 


 


 


 0.00-0.49


UG/ML


 


Sodium Level 138     135-145  MMOL/L


 


Potassium Level 3.4 L    3.6-5.0  MMOL/L


 


Chloride Level 103       MMOL/L


 


Carbon Dioxide Level 24     21-32  MMOL/L


 


Anion Gap 11     5-14  MMOL/L


 


Blood Urea Nitrogen 7     7-18  MG/DL


 


Creatinine


 1.03 


 


 


 


 0.60-1.30


MG/DL


 


Estimat Glomerular Filtration


Rate > 60 


 


 


 


  





 


BUN/Creatinine Ratio 7      


 


Glucose Level 106 H      MG/DL


 


Calcium Level 8.7     8.5-10.1  MG/DL


 


Corrected Calcium 8.5     8.5-10.1  MG/DL


 


Magnesium Level 2.0     1.6-2.4  MG/DL


 


Total Bilirubin 0.3     0.1-1.0  MG/DL


 


Aspartate Amino Transf


(AST/SGOT) 20 


 


 


 


 5-34  U/L





 


Alanine Aminotransferase


(ALT/SGPT) 34 


 


 


 


 0-55  U/L





 


Alkaline Phosphatase 100       U/L


 


Lactate Dehydrogenase 224 H    125-220  U/L


 


Total Creatine Kinase 169       U/L


 


Creatine Kinase MB 1.1     <6.6  NG/ML


 


Myoglobin


 40.2 


 


 


 


 10.0-92.0


NG/ML


 


Troponin I < 0.028    < 0.028  <0.028  NG/ML


 


C-Reactive Protein High


Sensitivity 0.26 


 


 


 


 0.00-0.50


MG/DL


 


B-Type Natriuretic Peptide 15.0     <100.0  PG/ML


 


Total Protein 7.4     6.4-8.2  GM/DL


 


Albumin 4.2     3.2-4.5  GM/DL


 


Amylase Level 52       U/L


 


Lipase 31     8-78  U/L


 


Procalcitonin 0.03     <0.10  NG/ML


 


Serum Alcohol < 10     <10  MG/DL


 


Coronavirus 2019 (AVANI)  Negative    Negative  


 


Urine Color   YELLOW    


 


Urine Clarity   CLOUDY    


 


Urine pH   7.0   5-9  


 


Urine Specific Gravity   1.020   1.016-1.022  


 


Urine Protein   NEGATIVE   NEGATIVE  


 


Urine Glucose (UA)   NEGATIVE   NEGATIVE  


 


Urine Ketones   NEGATIVE   NEGATIVE  


 


Urine Nitrite   NEGATIVE   NEGATIVE  


 


Urine Bilirubin   NEGATIVE   NEGATIVE  


 


Urine Urobilinogen   0.2   < = 1.0  MG/DL


 


Urine Leukocyte Esterase   NEGATIVE   NEGATIVE  


 


Urine RBC (Auto)   NEGATIVE   NEGATIVE  


 


Urine RBC   NONE    /HPF


 


Urine WBC   NONE    /HPF


 


Urine Squamous Epithelial


Cells 


 


 2-5 


 


  /HPF





 


Urine Crystals   PRESENT H   /LPF


 


Urine Amorphous Sediment


 


 


 LARGE AGGIE


URATES H 


  /LPF





 


Urine Bacteria   NEGATIVE    /HPF


 


Urine Casts   NONE    /LPF


 


Urine Mucus   NEGATIVE    /LPF


 


Urine Culture Indicated   NO    


 


Urine Opiates Screen   NEGATIVE   NEGATIVE  


 


Urine Oxycodone Screen   NEGATIVE   NEGATIVE  


 


Urine Methadone Screen   NEGATIVE   NEGATIVE  


 


Urine Propoxyphene Screen   NEGATIVE   NEGATIVE  


 


Urine Barbiturates Screen   NEGATIVE   NEGATIVE  


 


Ur Tricyclic Antidepressants


Screen 


 


 NEGATIVE 


 


 NEGATIVE  





 


Urine Phencyclidine Screen   NEGATIVE   NEGATIVE  


 


Urine Amphetamines Screen   NEGATIVE   NEGATIVE  


 


Urine Methamphetamines Screen   NEGATIVE   NEGATIVE  


 


Urine Benzodiazepines Screen   NEGATIVE   NEGATIVE  


 


Urine Cocaine Screen   NEGATIVE   NEGATIVE  


 


Urine Cannabinoids Screen   POSITIVE H  NEGATIVE  


 


Test


 12/15/20


06:40 


 


 


 Range/Units


 


 


Troponin I < 0.028     <0.028  NG/ML








Micro Results





Microbiology


12/15/20 Influenza Types A,B Antigen (JOSE EDUARDO) - Final, Complete


           





My Orders





Orders - RIDDHI VAUGHN DO


Cbc With Automated Diff (12/14/20 23:06)


Magnesium (12/14/20 23:06)


Chest 1 View, Ap/Pa Only (12/14/20 23:06)


Ekg Tracing (12/14/20 23:06)


Comprehensive Metabolic Panel (12/14/20 23:06)


Myoglobin Serum (12/14/20 23:06)


Protime With Inr (12/14/20 23:06)


Partial Thromboplastin Time (12/14/20 23:06)


O2 (12/14/20 23:06)


Monitor-Rhythm Ecg Trace Only (12/14/20 23:06)


Ed Iv/Invasive Line Start (12/14/20 23:06)


Creatine Kinase (12/14/20 23:06)


Creatine Kinase Mb (12/14/20 23:06)


Lipase (12/14/20 23:06)


Amylase (12/14/20 23:06)


BNP (12/14/20 23:06)


Troponin I (12/14/20 23:06)


Alcohol (12/14/20 23:06)


Drug Screen Stat (Urine) (12/14/20 23:06)


Ua Culture If Indicated (12/14/20 23:06)


Ct Head Wo-R/O Stroke (12/14/20 23:06)


Procalcitonin (Pct) (12/14/20 23:16)


Hs C Reactive Protein (12/14/20 23:16)


LDH (12/14/20 23:16)


Coronavirus Sars-Cov-2 So 2019 (12/14/20 23:16)


Covid 19 Inhouse Test (12/14/20 23:16)


Erythrocyte Sedimentation Rate (12/14/20 23:05)


Fibrin Degradation Products (12/14/20 23:05)


Influenza A And B Antigens (12/14/20 23:39)


Aspirin Chewable Tablet (Baby Aspirin Ch (12/15/20 00:15)


Nitroglycerin 0.4 Mg Btl 25's (Nitrostat (12/15/20 00:15)


Acetaminophen  Tablet (Tylenol  Tablet) (12/15/20 00:30)


Ct Angio Head/Neck (12/15/20 00:42)


Ekg Tracing (12/15/20 02:14)


Troponin I (12/15/20 02:14)


Ketorolac Injection (Toradol Injection) (12/15/20 03:00)


Morphine  Injection (Morphine  Injection (12/15/20 05:03)





Medications Given in ED





Current Medications








 Medications  Dose


 Ordered  Sig/Duy


 Route  Start Time


 Stop Time Status Last Admin


Dose Admin


 


 Acetaminophen  1,000 mg  ONCE  ONCE


 PO  12/15/20 00:30


 12/15/20 00:31 DC 12/15/20 00:26


1,000 MG


 


 Aspirin  324 mg  ONCE  ONCE


 PO  12/15/20 00:15


 12/15/20 00:16 DC 12/15/20 00:22


324 MG


 


 Ketorolac


 Tromethamine  30 mg  ONCE  ONCE


 IVP  12/15/20 03:00


 12/15/20 03:01 DC 12/15/20 03:06


30 MG


 


 Nitroglycerin  1 TAB Q 5


 MIN X 3  AS NEEDED  PRN


 SL  12/15/20 00:15


 12/15/20 06:29 DC 12/15/20 00:46


0.4 MG








Vital Signs/I&O











 12/14/20 12/15/20 12/15/20 12/15/20





 23:05 05:45 05:53 06:00


 


Temp 35.7 36.0 36.4 36.4


 


Pulse 68 49 55 55


 


Resp 20 18 18 18


 


B/P (MAP) 145/98 (114) 113/76 (114) 127/75 127/75 (92)


 


Pulse Ox  98 95 95


 


O2 Delivery Room Air Room Air Room Air Room Air


 


    





 12/15/20 12/15/20 12/15/20 12/15/20





 06:00 06:15 06:30 06:45


 


Pulse  55 47 64


 


B/P (MAP)  119/71 (87) 117/75 (89) 122/79 (93)


 


Pulse Ox  94 93 94


 


O2 Delivery Room Air Room Air Room Air Room Air





Capillary Refill : Less Than 3 Seconds








Blood Pressure Mean:                    114








Progress Note :  


Progress Note


PLACED IN ISOLATION ROOM 


PPE WORN AT ALL TIMES


COVID-19 TESTING PERFORMED





PT GIVEN ASPIRIN AND NTG--NO RELIEF


GIVEN TORADOL--NO RELIEF





OBSERVED IN ER FOR SEVERAL HOURS, REPEAT EKG AND TROPONIN DONE AND ARE NEGATIVE.




PT SLEPT THROUGH MOST OF ER STAY


HAD TO WAKE PT UP TO ASK ABOUT PAIN, CONTINUED TO C/O HEADCHE AND CHEST 

PAIN--"9/10"--THEN WOULD GO BACK TO SLEEP





Departure


Communication (Admissions)


2278--SPOKE WITH DR. BACON, HOSPITALIST FOR [facility]. ACCEPTS PT FOR ADMIT. WILL

CONSULT CARDIOLOGY THIS AM





Impression





   Primary Impression:  


   Chest pain


   Additional Impression:  


   LEFT SIDED PARESTHESIAS


Disposition:  09 ADMITTED AS INPATIENT


Condition:  Stable





Admissions


Decision to Admit Reason:  Admit from ER (General)


Decision to Admit/Date:  Dec 15, 2020


Time/Decision to Admit Time:  04:45





Departure-Patient Inst.


Referrals:  


[facility] (PCP)


Primary Care Physician








HENOK JULIAN (Family)


Primary Care Physician





Images


Torso/Trunk











1 - Tenderness


2 - Tenderness














RIDDHI VAUGHN DO                 Dec 15, 2020 04:08

## 2020-12-15 NOTE — NUR
TABATHA HAYNES SR demonstrates understanding of discharge instructions and accurately returns 
instructions upon questioning.  Copy of Post-Discharge Instructions and Medication Discharge 
Instructions given to . TABATHA HAYNES SR is to manage continuing needs after discharge.  
Patients belongings returned to patient. Skin dry and intact; no breakdown noted. Patient 
discharged from [facility] on 12/15/20 at 1620. TABATHA HAYNES SR left floor via wheelchair, 
accompanied by staff.

## 2020-12-15 NOTE — DISCHARGE SUMMARY
Discharge Summary


Hospital Course


Was the Problem List Reviewed?:  Yes


Problems/Dx:  


(1) Chest pain


Status:  Acute


Hospital Course


Date of Admission: Dec 15, 2020 at 04:44 


Admission Diagnosis :  





Family Physician/Provider: Mike Shah  





Date of Discharge: 12/15/20 


Discharge Diagnosis: chest pain r/o ACS, etohism








Hospital Course:


see H&P














Labs and Pending Lab Test:


Laboratory Tests


20 23:05: 


White Blood Count 7.3, Red Blood Count 4.75, Hemoglobin 15.2, Hematocrit 46, 

Mean Corpuscular Volume 98, Mean Corpuscular Hemoglobin 32, Mean Corpuscular 

Hemoglobin Concent 33, Red Cell Distribution Width 12.4, Platelet Count 289, 

Mean Platelet Volume 9.3, Immature Granulocyte % (Auto) 0, Neutrophils (%) 

(Auto) 57, Lymphocytes (%) (Auto) 29, Monocytes (%) (Auto) 8, Eosinophils (%) 

(Auto) 5, Basophils (%) (Auto) 1, Neutrophils # (Auto) 4.2, Lymphocytes # (Auto)

2.1, Monocytes # (Auto) 0.6, Eosinophils # (Auto) 0.4H, Basophils # (Auto) 0.1, 

Immature Granulocyte # (Auto) 0.0, Erythrocyte Sedimentation Rate 8, Prothrombin

Time 13.3, INR Comment 1.0, Activated Partial Thromboplast Time 30, D-Dimer < 

0.27, Sodium Level 138, Potassium Level 3.4L, Chloride Level 103, Carbon Dioxide

Level 24, Anion Gap 11, Blood Urea Nitrogen 7, Creatinine 1.03, Estimat 

Glomerular Filtration Rate > 60, BUN/Creatinine Ratio 7, Glucose Level 106H, 

Calcium Level 8.7, Corrected Calcium 8.5, Magnesium Level 2.0, Total Bilirubin 

0.3, Aspartate Amino Transf (AST/SGOT) 20, Alanine Aminotransferase (ALT/SGPT) 

34, Alkaline Phosphatase 100, Lactate Dehydrogenase 224H, Total Creatine Kinase 

169, Creatine Kinase MB 1.1, Myoglobin 40.2, Troponin I < 0.028, C-Reactive 

Protein High Sensitivity 0.26, B-Type Natriuretic Peptide 15.0, Total Protein 

7.4, Albumin 4.2, Amylase Level 52, Lipase 31, Procalcitonin 0.03, Serum Alcohol

< 10


20 23:33: 


Coronavirus (COVID-19)(PCR) [Pending], Coronavirus 2019 (AVANI) Negative


20 23:37: 


Urine Color YELLOW, Urine Clarity CLOUDY, Urine pH 7.0, Urine Specific Gravity 

1.020, Urine Protein NEGATIVE, Urine Glucose (UA) NEGATIVE, Urine Ketones 

NEGATIVE, Urine Nitrite NEGATIVE, Urine Bilirubin NEGATIVE, Urine Urobilinogen 

0.2, Urine Leukocyte Esterase NEGATIVE, Urine RBC (Auto) NEGATIVE, Urine RBC 

NONE, Urine WBC NONE, Urine Squamous Epithelial Cells 2-5, Urine Crystals 

PRESENTH, Urine Amorphous Sediment LARGE AGGIE URATESH, Urine Bacteria NEGATIVE, 

Urine Casts NONE, Urine Mucus NEGATIVE, Urine Culture Indicated NO, Urine 

Opiates Screen NEGATIVE, Urine Oxycodone Screen NEGATIVE, Urine Methadone Screen

 NEGATIVE, Urine Propoxyphene Screen NEGATIVE, Urine Barbiturates Screen 

NEGATIVE, Ur Tricyclic Antidepressants Screen NEGATIVE, Urine Phencyclidine 

Screen NEGATIVE, Urine Amphetamines Screen NEGATIVE, Urine Methamphetamines 

Screen NEGATIVE, Urine Benzodiazepines Screen NEGATIVE, Urine Cocaine Screen 

NEGATIVE, Urine Cannabinoids Screen POSITIVEH


12/15/20 02:50: Troponin I < 0.028


12/15/20 06:40: Troponin I < 0.028





Microbiology


12/15/20 Influenza Types A,B Antigen (JOSE EDUARDO) - Final, Complete


           





Home Meds


Active


Reported


Tylenol Extra Strength (Acetaminophen) 500 Mg Tablet 1,000 Mg PO Q8H PRN


Assessment/Pt Instructions


PCP 1 week


Discharge Planning:  <30 minutes discharge planning





Discharge Instructions


Discharge Diet:  No Restrictions





Discharge Physical Examination


Vital Signs





Vital Signs








  Date Time  Temp Pulse Resp B/P (MAP) Pulse Ox O2 Delivery O2 Flow Rate FiO2


 


12/15/20 13:51  57 18 91/77 (82) 95 Room Air  


 


12/15/20 08:00 36.4       








General Appearance:  No Apparent Distress, WD/WN, Chronically ill


Allergies:  


Coded Allergies:  


     Sulfa (Sulfonamide Antibiotics) (Verified  Allergy, Severe, HIVES, 20)


     levofloxacin (Verified  Allergy, Unknown, 20)


     doxycycline (Verified  Adverse Reaction, Mild, Vomiting, 20)





Discharge Summary


Date of Admission


Dec 15, 2020 at 04:44


Date of Discharge





Discharge Date:  Dec 15, 2020





Clinical Quality Measures


DVT/VTE Risk/Contraindication:


Risk Factor Score Per Nursin


RFS Level Per Nursing on Admit:  2=Moderate











EARNESTINE BACON DO                Dec 15, 2020 14:25

## 2020-12-15 NOTE — DIAGNOSTIC IMAGING REPORT
PATIENT HISTORY: Chest pain. 



TECHNIQUE: Single frontal view of the chest.



COMPARISON: 12/08/2020



FINDINGS:



The lung volumes are normal. No focal consolidation is seen. No

large pleural effusion or pneumothorax is seen. The

cardiomediastinal silhouette is normal in size and contour. No

acute osseous abnormality is seen.



IMPRESSION:



No acute pulmonary abnormality seen.



Dictated by: 



  Dictated on workstation # [id number]

## 2020-12-15 NOTE — CONSULTATION-CARDIOLOGY
HPI-Cardiology


Cardiology Consultation:


Date of Consultation


12/15/20


Time Seen by a Provider:  10:30


Date of Admission





Attending Physician


Connie Jay DO


Admitting Physician


[facility]


Consulting Physician


DELL ROSEN MD, MA, FACP, FACC, [facility], CCDS





HPI:


Chief Complaint:


CC: Chest pain


Mr. Wlech is a 40 yr old male admitted to [facility] from the ED with c/o CP.  He 

reports last night he was sitting on the couch at home when he developed left 

sided, stabbing chest pain which would come and go.  He reports the left sided 

pain radiated up into his left jaw and ear.  He reports he has chronic left 

upper and lower extremity weakness following CVA in 2019 which he states is 

unchanged from before.  He denies any SOB.  He reports he was having some 

nausea, but has not had any further.  He reports nothing has resolved his chest 

pain and he is still having it right now.  He is also reporting a headache.  He 

reports no change in the discomfort with movement or rest.  He does report 

worsening LACW discomfort with palpation.  He denies any palpitations.  No c/o 

syncope or near syncope.  No c/o LE swelling.





Review of Systems-Cardiology


Review of Systems


Constitutional:  No chills, No fever


Eyes:  No vision change


Respiratory:  As described under HPI


Cardiovascular:  As described under HPI


Gastrointestinal:  As described under HPI


Genitourinary:  No dysuria, No hematuria


Musculoskeletal:  muscle pain


Skin:  No rash on exposed areas, No ulcerations on exposed areas


Psychiatric/Neurological:  As described under HPI; No focal weakness, No syncope


Hematologic:  No bleeding abnormalities





PMH-Social-Family Hx


Patient Social History


Alcohol Use:  Denies Use


Recreational Drug Use:  No


Type Used:  Cigarettes


2nd Hand Smoke Exposure:  Yes


Recent Foreign Travel:  No


Recent Infectious Disease Expo:  No


Hospitalization with Isolation:  Denies





Immunizations Up To Date


Date of Influenza Vaccine:  Oct 1, 2020





Past Medical History


PMH


As described under Assessment.





Family Medical History


Family Medical History:  


He reports his father had CAD in his 50's





Allergies and Home Medications


Allergies


Coded Allergies:  


     Sulfa (Sulfonamide Antibiotics) (Verified  Allergy, Severe, HIVES, 20)


     levofloxacin (Verified  Allergy, Unknown, 20)


     doxycycline (Verified  Adverse Reaction, Mild, Vomiting, 20)





Home Medications


Acetaminophen 500 Mg Tablet, 1,000 MG PO Q8H PRN for PAIN-MILD (1-4), (Reported)





Patient Home Medication List


Home Medication List Reviewed:  Yes





Physical Exam-Cardiology


Physical Exam


Vital Signs/I&O











 12/14/20 12/15/20 12/15/20 12/15/20





 23:05 05:45 05:53 06:00


 


Temp 35.7 36.0 36.4 36.4


 


Pulse 68 49 55 55


 


Resp 20 18 18 18


 


B/P (MAP) 145/98 (114) 113/76 (114) 127/75 127/75 (92)


 


Pulse Ox  98 95 95


 


O2 Delivery Room Air Room Air Room Air Room Air


 


    





 12/15/20 12/15/20 12/15/20 12/15/20





 06:00 06:15 06:30 06:45


 


Pulse  55 47 64


 


B/P (MAP)  119/71 (87) 117/75 (89) 122/79 (93)


 


Pulse Ox  94 93 94


 


O2 Delivery Room Air Room Air Room Air Room Air





 12/15/20   





 08:00   


 


Temp 36.4   


 


Pulse 57   


 


Resp 20   


 


B/P (MAP) 116/80 (92)   


 


Pulse Ox 95   


 


O2 Delivery Room Air   





Capillary Refill : Less Than 3 Seconds


Constitutional:  AAO x 3, well-developed, well-nourished


HEENT:  PERRL, hearing is well preserved


Neck:  No carotid bruit; carotid pulses are 2 + bilaterally


Respiratory:  No accessory muscle use, No respiratory distress; chest expansion 

is symmetric, chest is bilaterally symmetric, lungs clear to auscultation


Cardiovascular:  regular rate-rhythm; No JVD; S1 and S2


Gastrointestinal:  No tender; soft, round, audible bowel sounds


Extremities:  no lower extremity edema bilateral


Neurologic/Psychiatric:  oriented x 3, other (LUE and LLE 4/5; RUE and RLE 5/5)


Skin:  No rash on exposed areas, No ulcerations on exposed areas





Data Review


Labs


Laboratory Tests


20 23:05: 


White Blood Count 7.3, Red Blood Count 4.75, Hemoglobin 15.2, Hematocrit 46, 

Mean Corpuscular Volume 98, Mean Corpuscular Hemoglobin 32, Mean Corpuscular 

Hemoglobin Concent 33, Red Cell Distribution Width 12.4, Platelet Count 289, 

Mean Platelet Volume 9.3, Immature Granulocyte % (Auto) 0, Neutrophils (%) 

(Auto) 57, Lymphocytes (%) (Auto) 29, Monocytes (%) (Auto) 8, Eosinophils (%) 

(Auto) 5, Basophils (%) (Auto) 1, Neutrophils # (Auto) 4.2, Lymphocytes # (Auto)

2.1, Monocytes # (Auto) 0.6, Eosinophils # (Auto) 0.4H, Basophils # (Auto) 0.1, 

Immature Granulocyte # (Auto) 0.0, Erythrocyte Sedimentation Rate 8, Prothrombin

Time 13.3, INR Comment 1.0, Activated Partial Thromboplast Time 30, D-Dimer < 

0.27, Sodium Level 138, Potassium Level 3.4L, Chloride Level 103, Carbon Dioxide

Level 24, Anion Gap 11, Blood Urea Nitrogen 7, Creatinine 1.03, Estimat 

Glomerular Filtration Rate > 60, BUN/Creatinine Ratio 7, Glucose Level 106H, 

Calcium Level 8.7, Corrected Calcium 8.5, Magnesium Level 2.0, Total Bilirubin 

0.3, Aspartate Amino Transf (AST/SGOT) 20, Alanine Aminotransferase (ALT/SGPT) 

34, Alkaline Phosphatase 100, Lactate Dehydrogenase 224H, Total Creatine Kinase 

169, Creatine Kinase MB 1.1, Myoglobin 40.2, Troponin I < 0.028, C-Reactive 

Protein High Sensitivity 0.26, B-Type Natriuretic Peptide 15.0, Total Protein 

7.4, Albumin 4.2, Amylase Level 52, Lipase 31, Procalcitonin 0.03, Serum Alcohol

< 10


20 23:33: Coronavirus 2019 (AVANI) Negative


20 23:37: 


Urine Color YELLOW, Urine Clarity CLOUDY, Urine pH 7.0, Urine Specific Gravity 

1.020, Urine Protein NEGATIVE, Urine Glucose (UA) NEGATIVE, Urine Ketones 

NEGATIVE, Urine Nitrite NEGATIVE, Urine Bilirubin NEGATIVE, Urine Urobilinogen 

0.2, Urine Leukocyte Esterase NEGATIVE, Urine RBC (Auto) NEGATIVE, Urine RBC 

NONE, Urine WBC NONE, Urine Squamous Epithelial Cells 2-5, Urine Crystals 

PRESENTH, Urine Amorphous Sediment LARGE AGGIE URATESH, Urine Bacteria NEGATIVE, 

Urine Casts NONE, Urine Mucus NEGATIVE, Urine Culture Indicated NO, Urine Opiate

s Screen NEGATIVE, Urine Oxycodone Screen NEGATIVE, Urine Methadone Screen 

NEGATIVE, Urine Propoxyphene Screen NEGATIVE, Urine Barbiturates Screen 

NEGATIVE, Ur Tricyclic Antidepressants Screen NEGATIVE, Urine Phencyclidine 

Screen NEGATIVE, Urine Amphetamines Screen NEGATIVE, Urine Methamphetamines 

Screen NEGATIVE, Urine Benzodiazepines Screen NEGATIVE, Urine Cocaine Screen 

NEGATIVE, Urine Cannabinoids Screen POSITIVEH


12/15/20 02:50: Troponin I < 0.028


12/15/20 06:40: Troponin I < 0.028





[facility]


12/15/20 Influenza Types A,B Antigen (JOSE EDUARDO) - Final, Complete


           








A/P-Cardiology


Assessment/Admission Diagnosis





Chest pain, noncardiac, etiology undetermined





Card cath of 12/15/20 showed no significant CAD, LVEDP 10 mmHg, LVEF 55-60%





Chronic tobacco use - smokes cigs





Marijuana usage - tested positive in the ED on 12-





HTN





Abnormal ECG.  ECG of 20 done during presentation to ED with chest pain did

not show acute changes but old IMI could not be excluded.  No significant change

on ECG of 2020 (occ PVC)





Reports h/o CVA, states had some L sided weakness in 2019, doesn't provide 

details.  Continues to report some L sided weakness.  No acute intracranial 

hemorrhage or CT evidence of acute territorial ischemia seen on CT of the head 

of 12-.  CTA of the head and neck of 12- No intracranial large 

vessel occlusion or saccular aneurysm.  No arterial occlusion or stenosis in the

major neck arteries








Family h/o early CAD (father in his 50's)





Anxiety





PTSD





Discussion and Recomendations





Chest pain of undetermined etiology for which he himself is worried it is 

cardiac related. Also has multiple cardiac risk factors. Card cath appeared 

appropriate. We discussed in great detail the procedure, risks, benefits and 

potential complications of cardiac cath with possible ad hoc coronary 

intervention. He provided informed consent. Cath did not any cause of cardiac 

pain.


We recommend continuing risk factor mod and outpt f/u


W/u for and treatment of noncardiac pain is with the Med Svce


Ok to d/c from cardiac standpoint





Clinical Quality Measures


DVT/VTE Risk/Contraindication:


Risk Factor Score Per Nursin


RFS Level Per Nursing on Admit:  2=Moderate











DELL ROSEN MD FACP FAC CCDS   Dec 15, 2020 11:03

## 2020-12-15 NOTE — PHYSICAL THERAPY EVALUATION
PT Evaluation-General


Medical Diagnosis


Admission Date


Dec 15, 2020 at 04:44


Medical Diagnosis:  chest pain/left sided weakness


Onset Date:  Dec 15, 2020





Therapy Diagnosis


Therapy Diagnosis:  debility





Height/Weight


Height (Feet):  6


Weight (Pounds):  242





Precautions


Precautions/Isolations:  Standard Precautions





Referral


Physician:  Boby


Reason for Referral:  Evaluation/Treatment





Medical History


Pertinent Medical History:  Alcoholism, CVA, Smoking


Additional Medical History


marijuana use


Current History


EMS secondary to CP and left sided weakness


Reviewed History:  Yes





Social History


Home:  Single Level


Current Living Status:  Spouse


Entry Into Home:  Stairs With Railing


PT Steps Into Home:  6





Prior


Prior Level of Function


SCALE: Activities may be completed with or without assistive devices.





6-Indepedent-patient completes the activity by him/herself with no assistance 

from a helper.


5-Set-up or Clean-up Assistance-helper sets up or cleans up; patient completes 

activity. Clio assists only prior to or  


    following the activity.


4-Supervision or Touching Assistance-helper provides verbal cues and/or 

touching/steadying and/or contact guard assistance as patient completes 

activity. Assistance may be provided   


    throughout the activity or intermittently.


3-Partial/Moderate Assistance-helper does LESS THAN HALF the effort. Clio 

lifts, holds or supports trunk or limbs, but provides less than half the effort.


2-Substantial/Maximal Assistance-helper does MORE THAN HALF the effort. Clio 

lifts or holds trunk or limbs and provides more than half the effort.


8-Hlgvpeemb-zgnbax does ALL the effort. Patient does none of the effort to 

complete the activity. Or, the assistance of 2 or more helpers is required for 

the patient to complete the  


    activity.


If activity was not attempted, code reason:


7-Patient Refused.


9-Not Applicable-not attempted and the patient did not perform the activity 

before the current illness, exacerbation or injury.


10-Not Attempted due to Environmental Limitations-(lack of equipment, weather 

restraints, etc.).


88-Not Attempted due to Medical Conditions or Safety Concerns.


Bed Mobility:  6


Transfers (B,C,W/C):  6


Gait:  6


Stairs:  6


Indoor Mobility (Ambulation):  Independent


Stairs:  Independent





PT Evaluation-Current


Subjective


Patient agrees to PT.





Objective


Patient Orientation:  Normal For Age


Attachments:  IV





ROM/Strength


ROM Lower Extremities


bilateral LE WFL


Strength Lower Extremities


4+/5 grossly bilateral LE





Integumentary/Posture


Integumentary


refer to nursing notes


Bowel Incontinence:  No


Bladder Incontinence:  No


Posture


WFL





Neuromuscular


(Tone, Coordination, Reflexes)


grossly intact





Sensory


Vision:  Functional


Hearing:  Functional





Transfers


Roll Left to Right (QC):  6


Sit to Lying (QC):  6


Lying to Sitting/Side of Bed(Q:  6


Sit to Stand (QC):  6





Gait


Does the Patient Walk?:  Yes


Mode of Locomotion:  Walk


Anticipated Mode of Locomotion:  Walk


Walk 10 feet (QC):  6


Walk 50 ft with 2 Turns(QC):  6


Walk 150 ft (QC):  6


Distance:  200'


Gait Assistive Device:  FWW


Comments/Gait Description


safe and functional with FWW at patient's request, however, is up independently 

in room without AD





Balance


Sitting Static:  Normal


Sitting Dynamic:  Normal


Standing Static:  Normal


 Standing Dynamic:  Normal





Assessment/Needs


40 y.o. male, is currently at independent PLOF without difficulty.  No PT 

indicated.


Rehab Potential:  Fair





PT Plan


Treatment/Plan


Treatment Plan:  Discontinue PT


Treatment Duration:  Dec 15, 2020


Frequency:  1 time per week


Estimated Hrs Per Day:  .25 hour per day





Time/GCodes


Time In:  1415


Time Out:  1425


Total Billed Treatment Time:  10


Total Billed Treatment


1 visit


EVLowC 10 min











WESTLEY MUIR PT              Dec 15, 2020 14:44

## 2020-12-15 NOTE — NUR
TABATHA HAYNES SR admitted to room 420-1, with an admitting diagnosis of CHEST PAIN, on 
12/15/20 from ER via ED, accompanied by STAFF. TABATHA HAYNES SR introduced to surroundings, 
call light, bed controls, phone, TV, temperature control, lights, meal times, smoking 
policy, visitor policy, side rail policy, bathrooms and showers.  Patient Rights given to 
patient in the handbook. TABATHA HAYNES SR verbalizes understanding that [facility] is not 
responsible for the loss or damage to any personal effects or valuables that are kept in the 
patients posession during their hospitalization.  TABATHA HAYNES SR verbalizes understanding 
of Interdisciplinary Patient Education. Patient and/or family were informed about the Rapid 
Response Team and its purpose.

## 2020-12-15 NOTE — DIAGNOSTIC IMAGING REPORT
PROCEDURE: CT angiography of the head and CT angiography of the

neck with and without contrast.



TECHNIQUE: Contiguous noncontrast images were obtained from the

skull base through the vertex. After intravenous contrast

administration, helical CT angiography of the neck was performed.

Source data was reformatted into 3D MIP projections. Delayed post

contrast acquisition was also obtained. Auto Exposure Controls

were utilized during the CT exam to meet ALARA standards for

radiation dose reduction. 



INDICATION:  Left-sided weakness



COMPARISON: Noncontrast CT head performed earlier same day



FINDINGS:



CTA NECK:



Aorta: Aortic arch is normal, with 2 vessel branching pattern.  



Anterior Circulation: The origin of the bilateral common carotid

arteries are patent. No stenosis of the common carotid arteries

in the neck. No significant stenosis of the internal carotid

arteries per NASCET criteria. The cervical segments of the

bilateral ICAs are patent.  The proximal external carotid

arteries are patent and without significant stenosis. 



Posterior Circulation: Origins of the bilateral vertebral

arteries are normal.  Vertebral arteries are co-dominant.  The

proximal extraousseous, intrasosseous, and distal extraosseous

segments of the vertebral arteries are patent without dissection

or stenosis. 



Non-vascular: No cervical lymphadenopathy.  The airway is patent.

No evidence of mucosal-based mass lesion in the pharynx.  Thyroid

is normal.  Salivary glands are normal.  No concerning lesion in

the cervical spine. 



CTA HEAD:



Anterior Circulation: The distal internal carotid arteries are

patent.  The bilateral M1 and M2 segments of the middle cerebral

arteries are patent and without stenosis.  The bilateral M3 and

M4 segments are symmetric in size and number.  The anterior

cerebral arteries are patent and without stenosis. Anterior

communicating artery is patent.  No saccular aneurysm in the

anterior circulation.  



Posterior Circulation: The bilateral intracranial segments of the

vertebral arteries are patent.  The basilar artery is patent and

without stenosis.  The posterior cerebral arteries are patent. 

Bilateral posterior communicating arteries are patent and without

aneurysm.  No saccular aneurysm in the posterior circulation. 



Post Contrast Head: No pathologic enhancement on delayed

post-contrast enhancement. 



IMPRESSION:

1. No intracranial large vessel occlusion or saccular aneurysm.

2. No arterial occlusion or stenosis in the major neck arteries. 

3. Findings are in agreement with the preliminary report.



Dictated by: 



  Dictated on workstation # [id number]

## 2020-12-15 NOTE — NUR
SPOKE WITH PT TO COMPLETE THE MED REC



PT DENIES TAKING ANY PRESCRIPTION MEDICATION BUT DID LET ME KNOW THAT EARLIER THIS WEEK DR. ROSEN HAD SENT SCRIPT TO [facility] BUT PT DID NOT  DUE TO COST.

ON 12- [facility] FILLED METOPROLOL SUCC 25MG AND NITRO 0.4MG



OTC MEDS:

TYLENOL

## 2020-12-15 NOTE — NUR
Notified by cardiac RN that the patient is ok to be discharged per Dr. Bailey with Dr. garcia approval. Notified Dr. Landis at this time.

## 2020-12-15 NOTE — CONSULTATION-CARDIOLOGY
HPI-Cardiology


Cardiology Consultation:


Date of Consultation


12/15/20


Time Seen by a Provider:  08:45


Date of Admission


2020


Attending Physician


Connie Jay DO


Admitting Physician


[place]


Consulting Physician


Benson Bailey MD





HPI:


Chief Complaint:


Chest pain


Mr. Haynes is a 40 yr old male admitted to [facility] from the ED with c/o CP.  He 

reports last night he was sitting on the couch at home when he developed left 

sided, stabbing chest pain which would come and go.  He reports the left sided 

pain radiated up into his left jaw and ear.  He reports he has chronic left 

upper and lower extremity weakness following CVA in 2019 which he states is 

unchanged from before.  He denies any SOB.  He reports he was having some 

nausea, but has not had any further.  He reports nothing has resolved his chest 

pain and he is still having it right now.  He is also reporting a headache.  He 

reports no change in the discomfort with movement or rest.  He does report 

worsening LACW discomfort with palpation.  He denies any palpitations.  No c/o 

syncope or near syncope.  No c/o LE swelling.





Review of Systems-Cardiology


Review of Systems


Constitutional:  No chills, No fever


Eyes:  No vision change


Respiratory:  As described under HPI


Cardiovascular:  As described under HPI


Gastrointestinal:  As described under HPI


Genitourinary:  No dysuria, No hematuria


Musculoskeletal:  muscle pain


Skin:  No rash on exposed areas, No ulcerations on exposed areas


Psychiatric/Neurological:  As described under HPI; No focal weakness, No syncope


Hematologic:  No bleeding abnormalities





PMH-Social-Family Hx


Patient Social History


Alcohol Use:  Denies Use


Recreational Drug Use:  No


Type Used:  Cigarettes


2nd Hand Smoke Exposure:  Yes


Recent Foreign Travel:  No


Recent Infectious Disease Expo:  No


Hospitalization with Isolation:  Denies





Immunizations Up To Date


Date of Influenza Vaccine:  Oct 1, 2020





Past Medical History


PMH


As described under Assessment.





Family Medical History


Family Medical History:  


He reports his father had CAD in his 50's





Allergies and Home Medications


Allergies


Coded Allergies:  


     Sulfa (Sulfonamide Antibiotics) (Verified  Allergy, Severe, HIVES, 20)


     levofloxacin (Verified  Allergy, Unknown, 20)


     doxycycline (Verified  Adverse Reaction, Mild, Vomiting, 20)





Home Medications


Acetaminophen 500 Mg Tablet, 1,000 MG PO Q8H PRN for PAIN-MILD (1-4), (Reported)





Patient Home Medication List


Home Medication List Reviewed:  Yes





Physical Exam-Cardiology


Physical Exam


Vital Signs/I&O











 12/14/20 12/15/20 12/15/20 12/15/20





 23:05 05:45 05:53 06:00


 


Temp 35.7 36.0 36.4 36.4


 


Pulse 68 49 55 55


 


Resp 20 18 18 18


 


B/P (MAP) 145/98 (114) 113/76 (114) 127/75 127/75 (92)


 


Pulse Ox  98 95 95


 


O2 Delivery Room Air Room Air Room Air Room Air


 


    





 12/15/20 12/15/20 12/15/20 12/15/20





 06:00 06:15 06:30 06:45


 


Pulse  55 47 64


 


B/P (MAP)  119/71 (87) 117/75 (89) 122/79 (93)


 


Pulse Ox  94 93 94


 


O2 Delivery Room Air Room Air Room Air Room Air





 12/15/20   





 08:00   


 


Temp 36.4   


 


Pulse 57   


 


Resp 20   


 


B/P (MAP) 116/80 (92)   


 


Pulse Ox 95   


 


O2 Delivery Room Air   





Capillary Refill : Less Than 3 Seconds


Constitutional:  AAO x 3, well-developed, well-nourished


HEENT:  PERRL, hearing is well preserved


Neck:  No carotid bruit; carotid pulses are 2 + bilaterally


Respiratory:  No accessory muscle use, No respiratory distress; chest expansion 

is symmetric, chest is bilaterally symmetric, lungs clear to auscultation


Cardiovascular:  regular rate-rhythm; No JVD; S1 and S2


Gastrointestinal:  No tender; soft, round, audible bowel sounds


Extremities:  no lower extremity edema bilateral


Neurologic/Psychiatric:  other (LUE and LLE 4/5; RUE and RLE 5/5)


Skin:  No rash on exposed areas, No ulcerations on exposed areas





Data Review


Labs


Laboratory Tests


20 23:05: 


White Blood Count 7.3, Red Blood Count 4.75, Hemoglobin 15.2, Hematocrit 46, 

Mean Corpuscular Volume 98, Mean Corpuscular Hemoglobin 32, Mean Corpuscular 

Hemoglobin Concent 33, Red Cell Distribution Width 12.4, Platelet Count 289, 

Mean Platelet Volume 9.3, Immature Granulocyte % (Auto) 0, Neutrophils (%) 

(Auto) 57, Lymphocytes (%) (Auto) 29, Monocytes (%) (Auto) 8, Eosinophils (%) 

(Auto) 5, Basophils (%) (Auto) 1, Neutrophils # (Auto) 4.2, Lymphocytes # (Auto)

2.1, Monocytes # (Auto) 0.6, Eosinophils # (Auto) 0.4H, Basophils # (Auto) 0.1, 

Immature Granulocyte # (Auto) 0.0, Erythrocyte Sedimentation Rate 8, Prothrombin

Time 13.3, INR Comment 1.0, Activated Partial Thromboplast Time 30, D-Dimer < 

0.27, Sodium Level 138, Potassium Level 3.4L, Chloride Level 103, Carbon Dioxide

Level 24, Anion Gap 11, Blood Urea Nitrogen 7, Creatinine 1.03, Estimat 

Glomerular Filtration Rate > 60, BUN/Creatinine Ratio 7, Glucose Level 106H, 

Calcium Level 8.7, Corrected Calcium 8.5, Magnesium Level 2.0, Total Bilirubin 

0.3, Aspartate Amino Transf (AST/SGOT) 20, Alanine Aminotransferase (ALT/SGPT) 

34, Alkaline Phosphatase 100, Lactate Dehydrogenase 224H, Total Creatine Kinase 

169, Creatine Kinase MB 1.1, Myoglobin 40.2, Troponin I < 0.028, C-Reactive 

Protein High Sensitivity 0.26, B-Type Natriuretic Peptide 15.0, Total Protein 

7.4, Albumin 4.2, Amylase Level 52, Lipase 31, Procalcitonin 0.03, Serum Alcohol

< 10


20 23:33: Coronavirus 2019 (AVANI) Negative


20 23:37: 


Urine Color YELLOW, Urine Clarity CLOUDY, Urine pH 7.0, Urine Specific Gravity 

1.020, Urine Protein NEGATIVE, Urine Glucose (UA) NEGATIVE, Urine Ketones 

NEGATIVE, Urine Nitrite NEGATIVE, Urine Bilirubin NEGATIVE, Urine Urobilinogen 

0.2, Urine Leukocyte Esterase NEGATIVE, Urine RBC (Auto) NEGATIVE, Urine RBC 

NONE, Urine WBC NONE, Urine Squamous Epithelial Cells 2-5, Urine Crystals 

PRESENTH, Urine Amorphous Sediment LARGE AGGIE URATESH, Urine Bacteria NEGATIVE, 

Urine Casts NONE, Urine Mucus NEGATIVE, Urine Culture Indicated NO, Urine 

Opiates Screen NEGATIVE, Urine Oxycodone Screen NEGATIVE, Urine Methadone Screen

NEGATIVE, Urine Propoxyphene Screen NEGATIVE, Urine Barbiturates Screen 

NEGATIVE, Ur Tricyclic Antidepressants Screen NEGATIVE, Urine Phencyclidine 

Screen NEGATIVE, Urine Amphetamines Screen NEGATIVE, Urine Methamphetamines 

Screen NEGATIVE, Urine Benzodiazepines Screen NEGATIVE, Urine Cocaine Screen 

NEGATIVE, Urine Cannabinoids Screen POSITIVEH


12/15/20 02:50: Troponin I < 0.028


12/15/20 06:40: Troponin I < 0.028





Microbiology


12/15/20 Influenza Types A,B Antigen (JOSE EDUARDO) - Final, Complete


           








Radiology


NAME:   TABATHA HAYNES [facility] REC#:   [id number]


ACCOUNT#:   [id number]


PT STATUS:   ADM Thierry


:   1980


PHYSICIAN:   RIDDHI THOMAS DO


ADMIT DATE:   12/15/20/4TH


***Draft***


Date of Exam:20





CHEST 1 VIEW, AP/PA ONLY








PATIENT HISTORY: Chest pain. 





TECHNIQUE: Single frontal view of the chest.





COMPARISON: 2020





FINDINGS:





The lung volumes are normal. No focal consolidation is seen. No


large pleural effusion or pneumothorax is seen. The


cardiomediastinal silhouette is normal in size and contour. No


acute osseous abnormality is seen.





IMPRESSION:





No acute pulmonary abnormality seen.





  Dictated on workstation # [id number]








Dict:   12/15/20 0745


Trans:   12/15/20 0748


 [id number]





Interpreted by:     JESUS CLIFTON MD


Electronically signed by:  


NAME:   TABATHA HAYNES [facility] REC#:   [id number]


ACCOUNT#:   [id number]


PT STATUS:   ADM Thierry


:   1980


PHYSICIAN:   RIDDHI THOMAS DO


ADMIT DATE:   12/15/20/4TH


***Draft***


Date of Exam:20





CT HEAD WO-R/O STROKE








PROCEDURE: CT head wo r/o stroke.





TECHNIQUE: Multiple contiguous axial images were obtained through


the brain without the use of intravenous contrast. Auto Exposure


Controls were utilized during the CT exam to meet ALARA standards


for radiation dose reduction. 





INDICATION: Left-sided weakness and chest pain.





COMPARISON: 2020





FINDINGS:





The ventricles and cortical sulci are age-appropriate. There is


no midline shift or mass effect. No acute intracranial hemorrhage


is seen. There is no CT evidence of acute territorial ischemia.


The calvarium appears intact. Visualized paranasal sinuses


demonstrate a small mucous retention cyst of the left maxillary


sinus.





IMPRESSION:


1. No acute intracranial hemorrhage or CT evidence of acute


territorial ischemia.





Findings reported to Dr. Thomas by the preliminary radiology


service at 2337 hours on 2020.





  Dictated on workstation # [id number]








Dict:   12/15/20 0746


Trans:   12/15/20 0749


 [id number]





Interpreted by:     JESUS CLIFTON MD


Electronically signed by:  


NAME:   TABATHA HAYNES [facility] REC#:   [id number]


ACCOUNT#:   [id number]


PT STATUS:   ADM Thierry


:   1980


PHYSICIAN:   RIDDHI THOMAS DO


ADMIT DATE:   12/15/20/4TH


***Signed***


Date of Exam:12/15/20





CT ANGIO HEAD/NECK








PROCEDURE: CT angiography of the head and CT angiography of the


neck with and without contrast.





TECHNIQUE: Contiguous noncontrast images were obtained from the


skull base through the vertex. After intravenous contrast


administration, helical CT angiography of the neck was performed.


Source data was reformatted into 3D MIP projections. Delayed post


contrast acquisition was also obtained. Auto Exposure Controls


were utilized during the CT exam to meet ALARA standards for


radiation dose reduction. 





INDICATION:  Left-sided weakness





COMPARISON: Noncontrast CT head performed earlier same day





FINDINGS:





CTA NECK:





Aorta: Aortic arch is normal, with 2 vessel branching pattern.  





Anterior Circulation: The origin of the bilateral common carotid


arteries are patent. No stenosis of the common carotid arteries


in the neck. No significant stenosis of the internal carotid


arteries per NASCET criteria. The cervical segments of the


bilateral ICAs are patent.  The proximal external carotid


arteries are patent and without significant stenosis. 





Posterior Circulation: Origins of the bilateral vertebral


arteries are normal.  Vertebral arteries are co-dominant.  The


proximal extraousseous, intrasosseous, and distal extraosseous


segments of the vertebral arteries are patent without dissection


or stenosis. 





Non-vascular: No cervical lymphadenopathy.  The airway is patent.


No evidence of mucosal-based mass lesion in the pharynx.  Thyroid


is normal.  Salivary glands are normal.  No concerning lesion in


the cervical spine. 





CTA HEAD:





Anterior Circulation: The distal internal carotid arteries are


patent.  The bilateral M1 and M2 segments of the middle cerebral


arteries are patent and without stenosis.  The bilateral M3 and


M4 segments are symmetric in size and number.  The anterior


cerebral arteries are patent and without stenosis. Anterior


communicating artery is patent.  No saccular aneurysm in the


anterior circulation.  





Posterior Circulation: The bilateral intracranial segments of the


vertebral arteries are patent.  The basilar artery is patent and


without stenosis.  The posterior cerebral arteries are patent. 


Bilateral posterior communicating arteries are patent and without


aneurysm.  No saccular aneurysm in the posterior circulation. 





Post Contrast Head: No pathologic enhancement on delayed


post-contrast enhancement. 





IMPRESSION:


1. No intracranial large vessel occlusion or saccular aneurysm.


2. No arterial occlusion or stenosis in the major neck arteries. 


3. Findings are in agreement with the preliminary report.





Dictated by: 





  Dictated on workstation # [id number]








Dict:   12/15/20 0807


Trans:   12/15/20 0820


[facility] [id number]





Interpreted by:     IVONE GARY MD


Electronically signed by: IVONE GARY MD 12/15/20 0820





ECG Impression


ECG


Initial ECG Rhythm:  Normal Sinus





A/P-Cardiology


Assessment/Admission Diagnosis


Chest pain of undetermined etiology





Chronic tobacco use - smokes cigs





Marijuana usage - tested positive in the ED on 12-





HTN





Abnormal ECG.  ECG of 20 done during presentation to ED with chest pain did

not show acute changes but old IMI could not be excluded.  No significant change

on ECG of 2020 (occ PVC)





Reports h/o CVA, states had some L sided weakness in 2019, doesn't provide 

details.  Continues to report some L sided weakness.  No acute intracranial 

hemorrhage or CT evidence of acute


territorial ischemia seen on CT of the head of 12-.  CTA of the head and 

neck of 12- No intracranial large vessel occlusion or saccular aneurysm. 

No arterial occlusion or stenosis in the major neck arteries.  Findings are in 

agreement with the preliminary report.








Family h/o early CAD (father in his 50's)





Anxiety





PTSD





Discussion and Recomendations


Chest pain of undetermined etiology for which he himself is worried it is 

cardiac related


We have discussed in great detail the procedure, risks, benefits and potential 

complications of cardiac cath with possible ad hoc coronary intervention


He verbalizes understanding and provides informed consent


We will proceed later today


Further recs will be based on his hospital course


We would like to thank medical services for this consult





Clinical Quality Measures


DVT/VTE Risk/Contraindication:


Risk Factor Score Per Nursin


RFS Level Per Nursing on Admit:  2=Moderate











JENNI JUDGE           Dec 15, 2020 09:06

## 2020-12-15 NOTE — HISTORY & PHYSICAL-HOSPITALIST
History of Present Illness


HPI/Chief Complaint


CC: Chest pain with left-sided weakness 





HPI: This is a 40yoWM who presents to the ER and had an extensive work up in the

ER including a stroke protocol and acute coronary syndrome protocol, both of 

which were negative who continues to have chest pain. Cardiology will talk to 

him about undergoing cardiac catheterization and I will initiate some PT to see 

if the weakness is something that needs to be pursued but he does not appear to 

be having any significant distress and his left-sided weakness is not noted to 

be profound.


Source:  patient


Exam Limitations:  no limitations


Date Seen


12/15/20


Time Seen by a Provider:  09:30


Attending Physician


Connie Jay DO


[facility]


Center/[facility] [place]


Referring Physician





Date of Admission


Dec 15, 2020 at 04:44





Home Medications & Allergies


Home Medications


Reviewed patient Home Medication Reconciliation performed by pharmacy medication

reconciliations technician and/or nursing.


Patients Allergies have been reviewed.





Allergies





Allergies


Coded Allergies


  Sulfa (Sulfonamide Antibiotics) (Verified Allergy, Severe, HIVES, 20)


  levofloxacin (Verified Allergy, Unknown, 20)


  doxycycline (Verified Adverse Reaction, Mild, Vomiting, 20)








Past Medical-Social-Family Hx


Past Med/Social Hx:  Reviewed Nursing Past Med/Soc Hx, Reviewed and Corrections 

made


Patient Social History


Marrital Status:  


Employed/Student:  unemployed


Alcohol Use:  Regular Use


Alcohol Beverage of Choice:  Beer


Recreational Drug Use:  No


Smoking Status:  Current Everyday Smoker


Type Used:  Cigarettes


2nd Hand Smoke Exposure:  Yes


Recent Foreign Travel:  No


Contact w/other who traveled:  No


Recent Hopitalizations:  No


Recent Infectious Disease Expo:  No





Immunizations Up To Date


Date of Influenza Vaccine:  Oct 1, 2020





Past Medical History


Surgeries:  Tonsillectomy, Vasectomy


Reproductive:  No


Genitourinary:  Kidney Stones


Musculoskeletal:  Chronic Back Pain


HEENT:  Tonsilitis


Psychosocial:  Anxiety, PTSD


History of Blood Disorders:  No





Review of Systems


Constitutional:  see HPI


Cardiovascular:  chest pain





Physical Exam


Physical Exam


Vital Signs





Vital Signs - First Documented








 12/14/20 12/15/20





 23:05 05:45


 


Temp 35.7 


 


Pulse 68 


 


Resp 20 


 


B/P (MAP) 145/98 (114) 


 


Pulse Ox  98


 


O2 Delivery Room Air 





Capillary Refill : Less Than 3 Seconds


Height, Weight, BMI


Height: 6'"


Weight: 242lbs. oz. 109.912066ib; 31.11 BMI


Method:Stated


General Appearance:  No Apparent Distress, Chronically ill


Eyes:  Right Eye Normal Inspection, Right Eye PERRL


HEENT:  PERRL/EOMI, Normal ENT Inspection, Pharynx Normal, Moist Mucous 

Membranes


Neck:  Full Range of Motion, Normal Inspection, Non Tender


Respiratory:  Chest Non Tender, Lungs Clear, Normal Breath Sounds, No Accessory 

Muscle Use, No Respiratory Distress


Cardiovascular:  Regular Rate, Rhythm, No Edema, No Gallop, No JVD, No Murmur, 

Normal Peripheral Pulses


Gastrointestinal:  Normal Bowel Sounds, No Organomegaly, No Pulsatile Mass, Non 

Tender, Soft


Back:  Normal Inspection, No CVA Tenderness, No Vertebral Tenderness


Extremity:  Normal Capillary Refill, Normal Inspection, Normal Range of Motion, 

Non Tender, No Calf Tenderness, No Pedal Edema


Neurologic/Psychiatric:  Alert, Oriented x3, No Motor/Sensory Deficits, Normal 

Mood/Affect


Skin:  Normal Color, Warm/Dry


Lymphatic:  No Adenopathy





Results


Results/Procedures


Labs


Laboratory Tests


20 23:05








Patient resulted labs reviewed.





Assessment/Plan


Admission Diagnosis


Assessment:


CP


Left sided weakness?





Plan:


Cardiology appreciated


PT


Admission Status:  Observation





Clinical Quality Measures


DVT/VTE Risk/Contraindication:


Risk Factor Score Per Nursin


RFS Level Per Nursing on Admit:  2=Moderate











EARNESTINE BACON DO                Dec 15, 2020 06:13

## 2020-12-15 NOTE — CARDIAC CATHETERIZATION
DATE OF SERVICE:  12/11/2020



CARDIAC CATHETERIZATION REPORT



The patient is a 40-year-old man with coronary artery disease risk factors, who

presented with chest pain.  He has been very concerned that the chest pain is of

cardiac origin.  He has chronic left sided weakness, which has not change

significantly in the recent past.  Cardiac catheterization was carried out today

after having obtained informed consent.



DESCRIPTION OF PROCEDURE:

He was brought to the cardiac catheterization laboratory after informed consent

had been obtained.  Right groin was prepared and draped in the usual sterile

fashion.  Lidocaine 1% was used for local anesthesia.  Modified Seldinger

technique was used to advance a 5-[place] sheath in right femoral artery.  A

5-[place] JR4 catheter was used for left coronary angiography, 5-[place] JR4

catheter for left coronary angiography, 5-[place] pigtail catheter was used for

left heart catheterization and left ventricular angiography.  The pigtail

catheter was removed.  Angiography of the right femoral artery was carried out

through the sheath.  Mynx was used to achieve hemostasis.



HEMODYNAMICS:

Left ventricular end-diastolic pressure following coronary angiography was 10

mmHg.  There is no significant pressure gradient on pullback across the aortic

valve.  Ascending aortic pressure was 120/75 with a mean of 92 mmHg.



CORONARY ANGIOGRAPHY:

Left main coronary artery, left anterior descending artery, the left circumflex

artery, right coronary artery do not exhibit any angiographically significant

disease.  Right coronary artery is dominant.



LEFT VENTRICULAR ANGIOGRAPHY:

Left ventricular angiography was carried out in the MENDOSA projection.  Global left

ventricular systolic function normal.  No regional wall motion abnormalities

seen in this view.  Left ventricular ejection fraction of 55-60%.



CONCLUSIONS:

1.  No angiographically significant coronary artery disease.

2.  Normal left ventricular end-diastolic pressure.

3.  Normal left ventricular systolic function with ejection fraction of 55% to

60%.



DISCUSSION AND RECOMMENDATIONS:

Based on results of the study, chest discomfort does not appear to be of

coronary origin.  Risk factor modification is advised.  Have advised to refrain

from tobacco use.  Other risk factor modification has also been discussed. 

Outpatient followup is advised.





Job ID: [id number]

DocumentID: [id number]

Dictated Date:  12/15/2020 11:08:37

Transcription Date: 12/15/2020 14:26:43

Dictated By: DELL ROSEN MD, MA, FACP, FACC,

## 2020-12-15 NOTE — NUR
PT C/O CP STILL THE SAME. EKG DONE SINUS YURY. DR HIRSCH NOTIFIED AND ALSO INFORMED  ABOUT  
PT'S HR DROPPING IN THE HIGH 40s. EKG DONE SINUS YURY.

## 2020-12-15 NOTE — NUR
PATIENT'S WIFE CALLED AND UPDATED ON PROCEDURE AND POST PROCEDURE PLANS. WIFE HAS NO 
QUESTIONS AT THIS TIME.

## 2020-12-28 ENCOUNTER — HOSPITAL ENCOUNTER (EMERGENCY)
Dept: HOSPITAL 75 - ER | Age: 40
Discharge: HOME | End: 2020-12-28
Payer: MEDICARE

## 2020-12-28 VITALS — DIASTOLIC BLOOD PRESSURE: 86 MMHG | SYSTOLIC BLOOD PRESSURE: 116 MMHG

## 2020-12-28 VITALS — WEIGHT: 219.8 LBS | HEIGHT: 66.93 IN | BODY MASS INDEX: 34.5 KG/M2

## 2020-12-28 DIAGNOSIS — R53.1: ICD-10-CM

## 2020-12-28 DIAGNOSIS — Z88.2: ICD-10-CM

## 2020-12-28 DIAGNOSIS — Z77.22: ICD-10-CM

## 2020-12-28 DIAGNOSIS — Z95.9: ICD-10-CM

## 2020-12-28 DIAGNOSIS — Z88.1: ICD-10-CM

## 2020-12-28 DIAGNOSIS — R10.13: ICD-10-CM

## 2020-12-28 DIAGNOSIS — R07.89: Primary | ICD-10-CM

## 2020-12-28 LAB
ALBUMIN SERPL-MCNC: 4.1 GM/DL (ref 3.2–4.5)
ALP SERPL-CCNC: 85 U/L (ref 40–136)
ALT SERPL-CCNC: 31 U/L (ref 0–55)
APTT BLD: 29 SEC (ref 24–35)
APTT PPP: (no result) S
BACTERIA #/AREA URNS HPF: (no result) /HPF
BASOPHILS # BLD AUTO: 0 10^3/UL (ref 0–0.1)
BASOPHILS NFR BLD AUTO: 1 % (ref 0–10)
BILIRUB SERPL-MCNC: 0.6 MG/DL (ref 0.1–1)
BILIRUB UR QL STRIP: NEGATIVE
BUN/CREAT SERPL: 8
CALCIUM SERPL-MCNC: 8.6 MG/DL (ref 8.5–10.1)
CHLORIDE SERPL-SCNC: 108 MMOL/L (ref 98–107)
CO2 SERPL-SCNC: 25 MMOL/L (ref 21–32)
CREAT SERPL-MCNC: 0.98 MG/DL (ref 0.6–1.3)
EOSINOPHIL # BLD AUTO: 0.2 10^3/UL (ref 0–0.3)
EOSINOPHIL NFR BLD AUTO: 3 % (ref 0–10)
FIBRINOGEN PPP-MCNC: CLEAR MG/DL
GFR SERPLBLD BASED ON 1.73 SQ M-ARVRAT: > 60 ML/MIN
GLUCOSE SERPL-MCNC: 94 MG/DL (ref 70–105)
GLUCOSE UR STRIP-MCNC: NEGATIVE MG/DL
HCT VFR BLD CALC: 43 % (ref 40–54)
HGB BLD-MCNC: 14.4 G/DL (ref 13.3–17.7)
INR PPP: 1 (ref 0.8–1.4)
KETONES UR QL STRIP: NEGATIVE
LEUKOCYTE ESTERASE UR QL STRIP: NEGATIVE
LIPASE SERPL-CCNC: 12 U/L (ref 8–78)
LYMPHOCYTES # BLD AUTO: 1.4 10^3/UL (ref 1–4)
LYMPHOCYTES NFR BLD AUTO: 17 % (ref 12–44)
MAGNESIUM SERPL-MCNC: 2.2 MG/DL (ref 1.6–2.4)
MANUAL DIFFERENTIAL PERFORMED BLD QL: NO
MCH RBC QN AUTO: 32 PG (ref 25–34)
MCHC RBC AUTO-ENTMCNC: 34 G/DL (ref 32–36)
MCV RBC AUTO: 97 FL (ref 80–99)
MONOCYTES # BLD AUTO: 0.5 10^3/UL (ref 0–1)
MONOCYTES NFR BLD AUTO: 6 % (ref 0–12)
NEUTROPHILS # BLD AUTO: 6 10^3/UL (ref 1.8–7.8)
NEUTROPHILS NFR BLD AUTO: 73 % (ref 42–75)
NITRITE UR QL STRIP: NEGATIVE
PH UR STRIP: 7 [PH] (ref 5–9)
PLATELET # BLD: 268 10^3/UL (ref 130–400)
PMV BLD AUTO: 8.9 FL (ref 9–12.2)
POTASSIUM SERPL-SCNC: 3.6 MMOL/L (ref 3.6–5)
PROT SERPL-MCNC: 6.9 GM/DL (ref 6.4–8.2)
PROT UR QL STRIP: NEGATIVE
PROTHROMBIN TIME: 13.4 SEC (ref 12.2–14.7)
RBC #/AREA URNS HPF: (no result) /HPF
SODIUM SERPL-SCNC: 140 MMOL/L (ref 135–145)
SP GR UR STRIP: 1.02 (ref 1.02–1.02)
WBC # BLD AUTO: 8.1 10^3/UL (ref 4.3–11)
WBC #/AREA URNS HPF: (no result) /HPF

## 2020-12-28 PROCEDURE — 93041 RHYTHM ECG TRACING: CPT

## 2020-12-28 PROCEDURE — 72125 CT NECK SPINE W/O DYE: CPT

## 2020-12-28 PROCEDURE — 85610 PROTHROMBIN TIME: CPT

## 2020-12-28 PROCEDURE — 85730 THROMBOPLASTIN TIME PARTIAL: CPT

## 2020-12-28 PROCEDURE — 81000 URINALYSIS NONAUTO W/SCOPE: CPT

## 2020-12-28 PROCEDURE — 93005 ELECTROCARDIOGRAM TRACING: CPT

## 2020-12-28 PROCEDURE — 71260 CT THORAX DX C+: CPT

## 2020-12-28 PROCEDURE — 86141 C-REACTIVE PROTEIN HS: CPT

## 2020-12-28 PROCEDURE — 85025 COMPLETE CBC W/AUTO DIFF WBC: CPT

## 2020-12-28 PROCEDURE — 83690 ASSAY OF LIPASE: CPT

## 2020-12-28 PROCEDURE — 82962 GLUCOSE BLOOD TEST: CPT

## 2020-12-28 PROCEDURE — 80053 COMPREHEN METABOLIC PANEL: CPT

## 2020-12-28 PROCEDURE — 70450 CT HEAD/BRAIN W/O DYE: CPT

## 2020-12-28 PROCEDURE — 83735 ASSAY OF MAGNESIUM: CPT

## 2020-12-28 PROCEDURE — 84484 ASSAY OF TROPONIN QUANT: CPT

## 2020-12-28 PROCEDURE — 74177 CT ABD & PELVIS W/CONTRAST: CPT

## 2020-12-28 PROCEDURE — 36415 COLL VENOUS BLD VENIPUNCTURE: CPT

## 2020-12-28 PROCEDURE — 83874 ASSAY OF MYOGLOBIN: CPT

## 2020-12-28 PROCEDURE — 71045 X-RAY EXAM CHEST 1 VIEW: CPT

## 2020-12-28 NOTE — DIAGNOSTIC IMAGING REPORT
INDICATION: Chest pain.



TECHNIQUE: Frontal chest obtained at 10:10 a.m. and compared to

12/14/2020.



FINDINGS: Heart and mediastinal silhouette are normal in

appearance. The lungs are clear. There is no pneumothorax or

pleural fluid.



IMPRESSION: Negative chest.



Dictated by: 



  Dictated on workstation # [id number]

## 2020-12-28 NOTE — DIAGNOSTIC IMAGING REPORT
PROCEDURE: CT head and CT cervical spine without contrast.



TECHNIQUE: Multiple contiguous axial images were obtained through

the brain and cervical spine without the use of intravenous

contrast. Sagittal and coronal reformations through the cervical

spine were then performed. Auto Exposure Controls were utilized

during the CT exam to meet ALARA standards for radiation dose

reduction. 



INDICATION:  Trauma, fall.



Comparison is made to prior CT head from 12/04/2020.



CT HEAD:



The ventricles and sulci are within normal limits. No sulcal

effacement or midline shift is identified. No acute intra-axial

or extra-axial hemorrhage is detected. Cisterns are patent.

Visualized paranasal sinuses demonstrate a mucous retention cyst

or polyp in the left maxillary sinus.



IMPRESSION: No acute intracranial process is detected



CT CERVICAL SPINE:



Alignment is normal. There is multilevel degenerative disc

disease with variable disc space narrowing and marginal spurring.

No fractures are identified. Prevertebral tissues are within

normal limits. Odontoid is intact.



IMPRESSION: Cervical spondylosis. No acute bony abnormality is

detected.



 



Dictated by: 



  Dictated on workstation # [id number]

## 2020-12-28 NOTE — DIAGNOSTIC IMAGING REPORT
EXAMINATION: CT Chest, Abdomen and Pelvis with intravenous

contrast.



TECHNIQUE: Multiple contiguous axial images were obtained through

the chest, abdomen and pelvis after the uneventful administration

of intravenous contrast. All CT scans use one or more of the

following dose optimizing techniques: automated exposure control,

MA and/or KvP adjustment based on a patient size and exam type,

or iterative reconstruction. 



HISTORY: Fall, Abdominal pain



COMPARISON: CT abdomen/pelvis of 08/31/2020.



FINDINGS: 



Thyroid: The visualized thyroid gland is normal.



Mediastinum: Heart size is normal without significant pericardial

effusion. The aorta is normal in caliber. No suspicious

lymphadenopathy.



Lungs and airways: The lungs are clear without consolidation,

pleural effusion, or pneumothorax. Bibasilar dependent

atelectasis. The airways are normal.



Solid organs: The liver is normal without focal lesion. The

gallbladder is normal. There is no biliary ductal dilation.

Pancreas is normal.  Spleen is normal. Adrenal glands are normal.

The kidneys are normal without hydronephrosis.



Bowel: The stomach and small bowel are normal without

obstruction. The colon and appendix are normal. 



Peritoneum: There is no intraperitoneal free fluid or free air.

No suspicious lymphadenopathy. 



Vasculature: Calcification of the aorta without aneurysm.



Musculoskeletal: No suspicious osseous lesion or compression

fracture.



Pelvis: The prostate gland is normal. Mild diffuse bladder wall

thickening.



IMPRESSION:



1. No acute abnormality in the chest, abdomen, or pelvis.



Dictated by: 



  Dictated on workstation # [id number]

## 2020-12-28 NOTE — ED CHEST PAIN
General


Chief Complaint:  Chest Pain


Stated Complaint:  CHEST PAIN


Nursing Triage Note:  


ARRIVED VIA EMS FROM HOME.  STATES HE WALKED HOME APPX 2 MILES FROM [employer] 


ET STARTED HAVING CHEST PAIN WHILE WALKING HOME.  PT TOOK TWO NITRO PRIOR TO 


CALLING EMS.  EMS GAVE X1 NITRO, 324 ASA, AND MORPHINE 5 MG.  STATES HE ALSO 


FELL AND HIT HIS HEAD.


Nursing Sepsis Screen:  No Definite Risk


Source:  patient


Exam Limitations:  no limitations





History of Present Illness


Date Seen by Provider:  Dec 28, 2020


Time Seen by Provider:  09:50


Initial Comments


This 40-year-old man presents to the emergency room via EMS with complaints of 

chest pain this morning that started while he was walking home from work.  He 

went to work but was turned away because he lacked appropriate paperwork after 

having a hospital stay.  He did not have a ride home so started to walk home.  

On the way home he developed sharp chest pain.  He took 2 nitroglycerin and then

became lightheaded.  He then fell and struck his head.  He also complains of 

left-sided weakness.  He has chronic left-sided weakness from a prior stroke but

this became more pronounced when he became lightheaded.  EMS administered a 

third nitroglycerin and noted a 30 mmHg drop in his blood pressure.  Morphine 

was given to further treat his pain.  Patient has received aspirin.  Patient had

a heart cath 2 weeks ago by Dr. Bailey which revealed no significant pathology. 

Chest pain at that time was felt to likely be noncoronary in nature.  Patient 

also complains of abdominal pain.  He complains of head and neck pain.  C-collar

was applied.





Allergies and Home Medications


Allergies


Coded Allergies:  


     Sulfa (Sulfonamide Antibiotics) (Verified  Allergy, Severe, HIVES, 20)


     levofloxacin (Verified  Allergy, Unknown, 20)


     doxycycline (Verified  Adverse Reaction, Mild, Vomiting, 20)





Home Medications


Acetaminophen 500 Mg Tablet, 1,000 MG PO Q8H PRN for PAIN-MILD (1-4), (Reported)


Omeprazole 20 Mg Capsule., 20 MG PO BID


   Prescribed by: SOHAM JAMISON on 20 6674





Patient Home Medication List


Home Medication List Reviewed:  Yes





Review of Systems


Review of Systems


Constitutional:  no symptoms reported


EENTM:  No Symptoms Reported


Respiratory:  No Symptoms Reported


Cardiovascular:  See HPI


Gastrointestinal:  See HPI


Genitourinary:  No Symptoms Reported


Musculoskeletal:  see HPI


Skin:  no symptoms reported


Psychiatric/Neurological:  No Symptoms Reported


Endocrine:  No Symptoms Reported


Hematologic/Lymphatic:  No Symptoms Reported





Past Medical-Social-Family Hx


Past Med/Social Hx:  Reviewed Nursing Past Med/Soc Hx


Patient Social History


Alcohol Beverage of Choice:  Beer


Type Used:  Cigarettes


2nd Hand Smoke Exposure:  Yes


Recent Foreign Travel:  No


Contact w/Someone Who Travel:  No


Recent Infectious Disease Expo:  No


Recent Hopitalizations:  No





Immunizations Up To Date


Date of Influenza Vaccine:  Oct 1, 2020





Past Medical History


Surgeries:  Yes (KIDNEY STONES--BASKET REMOVAL, )


Cardiac (Cardiac cath 2020, no pathology), Renal, Tonsillectomy, 

Vasectomy


Respiratory:  No


Cardiac:  No


Neurological:  Yes (STROKE -LEFT SIDE WEAKNESS, MOSTLY RESOLVED)


Stroke


Reproductive Disorders:  No


Genitourinary:  Yes (EPIDIDYMITIS)


Kidney Stones


Gastrointestinal:  No


Musculoskeletal:  Yes


Chronic Back Pain


Endocrine:  Yes (hypoglycemia)


HEENT:  Yes (TONSILLECTOMY)


Tonsilitis


Cancer:  No


Psychosocial:  Yes


Anxiety, PTSD


Integumentary:  No


Blood Disorders:  No





Family Medical History





SOCIAL HISTORY:


-ETOH--OCCASIONAL USE


-DRUGS--DENIES USE


-SMOKES 1 PPD





Physical Exam


Vital Signs





Vital Signs - First Documented








 20





 09:50


 


Temp 37.0


 


Pulse 87


 


Resp 16


 


B/P (MAP) 144/96 (112)


 


Pulse Ox 98


 


O2 Delivery Room Air





Capillary Refill : Less Than 3 Seconds


Height, Weight, BMI


Height: 6'"


Weight: 242lbs. oz. 109.686661iz; 34.00 BMI


Method:Stated


General Appearance:  No Apparent Distress, WD/WN


HEENT:  PERRL/EOMI, Normal ENT Inspection


Neck:  Normal Inspection, Tender Midline


Respiratory:  Lungs Clear, Normal Breath Sounds, No Accessory Muscle Use, No 

Respiratory Distress


Cardiovascular:  Regular Rate, Rhythm, No Edema, No Murmur


Gastrointestinal:  Normal Bowel Sounds, Soft, Tenderness (Most sensitive in the 

epigastric region)


Extremity:  Normal Inspection, No Pedal Edema


Neurologic/Psychiatric:  Alert, Oriented x3, No Motor/Sensory Deficits, Normal 

Mood/Affect, CNs II-XII Norm as Tested, Motor Weakness (4/5 strength in the left

extremities)


Skin:  Normal Color, Warm/Dry





Progress/Results/Core Measures


Results/Orders


Lab Results





Laboratory Tests








Test


 20


10:05 20


10:25 20


12:41 Range/Units


 


 


White Blood Count


 8.1 


 


 


 4.3-11.0


10^3/uL


 


Red Blood Count


 4.44 


 


 


 4.30-5.52


10^6/uL


 


Hemoglobin 14.4    13.3-17.7  g/dL


 


Hematocrit 43    40-54  %


 


Mean Corpuscular Volume 97    80-99  fL


 


Mean Corpuscular Hemoglobin 32    25-34  pg


 


Mean Corpuscular Hemoglobin


Concent 34 


 


 


 32-36  g/dL





 


Red Cell Distribution Width 12.7    10.0-14.5  %


 


Platelet Count


 268 


 


 


 130-400


10^3/uL


 


Mean Platelet Volume 8.9 L   9.0-12.2  fL


 


Immature Granulocyte % (Auto) 0     %


 


Neutrophils (%) (Auto) 73    42-75  %


 


Lymphocytes (%) (Auto) 17    12-44  %


 


Monocytes (%) (Auto) 6    0-12  %


 


Eosinophils (%) (Auto) 3    0-10  %


 


Basophils (%) (Auto) 1    0-10  %


 


Neutrophils # (Auto)


 6.0 


 


 


 1.8-7.8


10^3/uL


 


Lymphocytes # (Auto)


 1.4 


 


 


 1.0-4.0


10^3/uL


 


Monocytes # (Auto)


 0.5 


 


 


 0.0-1.0


10^3/uL


 


Eosinophils # (Auto)


 0.2 


 


 


 0.0-0.3


10^3/uL


 


Basophils # (Auto)


 0.0 


 


 


 0.0-0.1


10^3/uL


 


Immature Granulocyte # (Auto)


 0.0 


 


 


 0.0-0.1


10^3/uL


 


Prothrombin Time 13.4    12.2-14.7  SEC


 


INR Comment 1.0    0.8-1.4  


 


Activated Partial


Thromboplast Time 29 


 


 


 24-35  SEC





 


Sodium Level 140    135-145  MMOL/L


 


Potassium Level 3.6    3.6-5.0  MMOL/L


 


Chloride Level 108 H     MMOL/L


 


Carbon Dioxide Level 25    21-32  MMOL/L


 


Anion Gap 7    5-14  MMOL/L


 


Blood Urea Nitrogen 8    7-18  MG/DL


 


Creatinine


 0.98 


 


 


 0.60-1.30


MG/DL


 


Estimat Glomerular Filtration


Rate > 60 


 


 


  





 


BUN/Creatinine Ratio 8     


 


Glucose Level 94      MG/DL


 


Calcium Level 8.6    8.5-10.1  MG/DL


 


Corrected Calcium 8.5    8.5-10.1  MG/DL


 


Magnesium Level 2.2    1.6-2.4  MG/DL


 


Total Bilirubin 0.6    0.1-1.0  MG/DL


 


Aspartate Amino Transf


(AST/SGOT) 22 


 


 


 5-34  U/L





 


Alanine Aminotransferase


(ALT/SGPT) 31 


 


 


 0-55  U/L





 


Alkaline Phosphatase 85      U/L


 


Myoglobin


 43.1 


 


 


 10.0-92.0


NG/ML


 


Troponin I < 0.028    <0.028  NG/ML


 


C-Reactive Protein High


Sensitivity 0.24 


 


 


 0.00-0.50


MG/DL


 


Total Protein 6.9    6.4-8.2  GM/DL


 


Albumin 4.1    3.2-4.5  GM/DL


 


Lipase 12    8-78  U/L


 


Urine Color  ORANGE    


 


Urine Clarity  CLEAR    


 


Urine pH  7.0   5-9  


 


Urine Specific Gravity  1.020   1.016-1.022  


 


Urine Protein  NEGATIVE   NEGATIVE  


 


Urine Glucose (UA)  NEGATIVE   NEGATIVE  


 


Urine Ketones  NEGATIVE   NEGATIVE  


 


Urine Nitrite  NEGATIVE   NEGATIVE  


 


Urine Bilirubin  NEGATIVE   NEGATIVE  


 


Urine Urobilinogen  1.0   < = 1.0  MG/DL


 


Urine Leukocyte Esterase  NEGATIVE   NEGATIVE  


 


Urine RBC (Auto)  NEGATIVE   NEGATIVE  


 


Urine RBC  NONE    /HPF


 


Urine WBC  0-2    /HPF


 


Urine Crystals  NONE    /LPF


 


Urine Bacteria  TRACE    /HPF


 


Urine Casts  NONE    /LPF


 


Urine Mucus  MODERATE H   /LPF


 


Urine Culture Indicated  NO    


 


Glucometer   84    MG/DL








My Orders





Orders - SOHAM OCAMPO MD


Cbc With Automated Diff (20 10:00)


Magnesium (20 10:00)


Chest 1 View, Ap/Pa Only (20 10:00)


Ekg Tracing (20 10:00)


Comprehensive Metabolic Panel (20 10:00)


Myoglobin Serum (20 10:00)


Protime With Inr (20 10:00)


Partial Thromboplastin Time (20 10:00)


O2 (20 10:00)


Monitor-Rhythm Ecg Trace Only (20 10:00)


Ed Iv/Invasive Line Start (20 10:00)


Troponin I (20 10:00)


Hs C Reactive Protein (20 10:00)


Lipase (20 10:00)


Ua Culture If Indicated (20 10:00)


Ct Head/Cervical Spine Wo (20 10:00)


Ct Chest/Abdomen/Pelvis W (20 10:03)


Iohexol Injection (Omnipaque 350 Mg/Ml 1 (20 11:00)


Received Contrast (Hold Metformin- Contr (20 11:00)


Ns (Ivpb) (Sodium Chloride 0.9% Ivpb Bag (20 11:00)


Ondansetron Injection (Zofran Injectio (20 12:45)


Lidocaine 2% Viscous 15 Ml (Xylocaine Vi (20 12:45)


Antacid  Suspension (Mylanta  Suspension (20 12:45)


Ketorolac Injection (Toradol Injection) (20 12:45)


Hydrocodone/Apap 5/325 Tablet (Lortab 5 (20 14:30)





Medications Given in ED





Vital Signs/I&O











 20





 09:50 14:31


 


Temp 37.0 


 


Pulse 87 46


 


Resp 16 16


 


B/P (MAP) 144/96 (112) 116/86


 


Pulse Ox 98 97


 


O2 Delivery Room Air Room Air














Blood Pressure Mean:                    112











FSBG Bedside Testing


Finger Stick Blood Glucose:  84





Progress


Progress Note :  


Progress Note


Work-up was unremarkable.  CT of the head and cervical spine was reviewed and c-

collar was removed after CT reports were reviewed.  CT of the chest, abdomen and

pelvis was also obtained due to patient's fall trauma and complaints of pain.  

No significant pathology was identified.  Patient was treated with Toradol which

did not improve his pain much.  GI cocktail was given for the epigastric pain 

which did help briefly.  A hydrocodone was given prior to discharge.  No a

dmissible diagnoses were identified.


Initial ECG Impression Date:  Dec 28, 2020


Initial ECG Impression Time:  09:48


Initial ECG Rate:  60


Initial ECG Rhythm:  Normal Sinus


Initial ECG Intervals:  Normal


Initial ECG Impression:  Normal


Comment


Normal sinus rhythm with no ST elevation or depression.  No abnormal intervals 

or axis deviation.





Diagnostic Imaging





   Diagonstic Imaging:  Xray


   Plain Films/CT/US/NM/MRI:  chest


Comments


NAME:   DALLASTABATHA W 


MED REC#:   [id number]


ACCOUNT#:   [id number]


PT STATUS:   REG ER


:   1980


PHYSICIAN:   SOHAM OCAMPO MD


ADMIT DATE:   20/ER


***Signed***


Date of Exam:20





CHEST 1 VIEW, AP/PA ONLY








INDICATION: Chest pain.





TECHNIQUE: Frontal chest obtained at 10:10 a.m. and compared to


2020.





FINDINGS: Heart and mediastinal silhouette are normal in


appearance. The lungs are clear. There is no pneumothorax or


pleural fluid.





IMPRESSION: Negative chest.





Dictated by: 





  Dictated on workstation # [id number]








Dict:   20 1042


Trans:   20 1232


AS6 [id number]





Interpreted by:     DRE DOWLING MD


Electronically signed by: DRE DOWLING MD 20 1232


   Reviewed:  Reviewed by Me








   Diagonstic Imaging:  CT


   Plain Films/CT/US/NM/MRI:  c-spine, head


Comments


NAME:   TABATHA HAYNES 


MED REC#:   [id number]


ACCOUNT#:   [id number]


PT STATUS:   DEP ER


:   1980


PHYSICIAN:   SOHAM OCAMPO MD


ADMIT DATE:   20/ER


***Signed***


Date of Exam:20





CT HEAD/CERVICAL SPINE WO








PROCEDURE: CT head and CT cervical spine without contrast.





TECHNIQUE: Multiple contiguous axial images were obtained through


the brain and cervical spine without the use of intravenous


contrast. Sagittal and coronal reformations through the cervical


spine were then performed. Auto Exposure Controls were utilized


during the CT exam to meet ALARA standards for radiation dose


reduction. 





INDICATION:  Trauma, fall.





Comparison is made to prior CT head from 2020.





CT HEAD:





The ventricles and sulci are within normal limits. No sulcal


effacement or midline shift is identified. No acute intra-axial


or extra-axial hemorrhage is detected. Cisterns are patent.


Visualized paranasal sinuses demonstrate a mucous retention cyst


or polyp in the left maxillary sinus.





IMPRESSION: No acute intracranial process is detected





CT CERVICAL SPINE:





Alignment is normal. There is multilevel degenerative disc


disease with variable disc space narrowing and marginal spurring.


No fractures are identified. Prevertebral tissues are within


normal limits. Odontoid is intact.





IMPRESSION: Cervical spondylosis. No acute bony abnormality is


detected.





 





Dictated by: 





  Dictated on workstation # [id number]








Dict:   20 1117


Trans:   20 1531


CV [id number]





Interpreted by:     SARAH BEVERLY MD


Electronically signed by: SARAH BEVERLY MD 20


   Reviewed:  Reviewed by Me








   Diagonstic Imaging:  CT


   Plain Films/CT/US/NM/MRI:  chest, abdomen, pelvis


Comments


NAME:   TABATHA HAYNES 


MED REC#:   [id number]


ACCOUNT#:   [id number]


PT STATUS:   REG ER


:   1980


PHYSICIAN:   SOHAM OCAMPO MD


ADMIT DATE:   20/ER


***Signed***


Date of Exam:20





CT CHEST/ABDOMEN/PELVIS W








EXAMINATION: CT Chest, Abdomen and Pelvis with intravenous


contrast.





TECHNIQUE: Multiple contiguous axial images were obtained through


the chest, abdomen and pelvis after the uneventful administration


of intravenous contrast. All CT scans use one or more of the


following dose optimizing techniques: automated exposure control,


MA and/or KvP adjustment based on a patient size and exam type,


or iterative reconstruction. 





HISTORY: Fall, Abdominal pain





COMPARISON: CT abdomen/pelvis of 2020.





FINDINGS: 





Thyroid: The visualized thyroid gland is normal.





Mediastinum: Heart size is normal without significant pericardial


effusion. The aorta is normal in caliber. No suspicious


lymphadenopathy.





Lungs and airways: The lungs are clear without consolidation,


pleural effusion, or pneumothorax. Bibasilar dependent


atelectasis. The airways are normal.





Solid organs: The liver is normal without focal lesion. The


gallbladder is normal. There is no biliary ductal dilation.


Pancreas is normal.  Spleen is normal. Adrenal glands are normal.


The kidneys are normal without hydronephrosis.





Bowel: The stomach and small bowel are normal without


obstruction. The colon and appendix are normal. 





Peritoneum: There is no intraperitoneal free fluid or free air.


No suspicious lymphadenopathy. 





Vasculature: Calcification of the aorta without aneurysm.





Musculoskeletal: No suspicious osseous lesion or compression


fracture.





Pelvis: The prostate gland is normal. Mild diffuse bladder wall


thickening.





IMPRESSION:





1. No acute abnormality in the chest, abdomen, or pelvis.





Dictated by: 





  Dictated on workstation # [id number]








Dict:   20 1116


Trans:   20 1135


AS6 [id number]





Interpreted by:     MICHAEL CALLOWAY DO


Electronically signed by: MICHAEL CALLOWAY DO 20 1135


   Reviewed:  Reviewed by Me





Departure


Impression





   Primary Impression:  


   Atypical chest pain


   Additional Impressions:  


   Epigastric pain


   Fall on same level


   Qualified Codes:  W18.30XA - Fall on same level, unspecified, initial 

   encounter


   Left-sided weakness


Disposition:  01 HOME, SELF-CARE


Condition:  Improved





Departure-Patient Inst.


Decision time for Depature:  14:23


Referrals:  


[facility] [place] ([place]P)


Primary Care Physician








HENOK JULIAN (Family)


Primary Care Physician


Patient Instructions:  Abdominal Pain, Adult ED, Chest Pain That Is Not Caused 

by the Heart (DC)





Add. Discharge Instructions:  


Take omeprazole as prescribed for antacid therapy.


You may take Tylenol (acetaminophen) up to 1000 mg every 6 hours as needed for 

pain.


Avoid use of ibuprofen or naproxen as that may make your stomach pain worse.


Icing affected areas in 20 min intervals may help reduce your pain as well.


Follow-up with your primary care provider as soon as possible.


Return to the emergency room with worsening symptoms.  Call with questions or 

concerns.





All discharge instructions reviewed with patient and/or family. Voiced 

understanding.


Scripts


Omeprazole (Omeprazole) 20 Mg Capsule.


20 MG PO BID, #60 CAP


   Prov: SOHAM OCAMPO MD         20





Copy


Copies To 1:   YEE PATEL DO


Copies To 2:   DELL BAILEY MD [facility]











SOHAM OCAMPO MD        Dec 28, 2020 14:25

## 2021-01-24 ENCOUNTER — HOSPITAL ENCOUNTER (EMERGENCY)
Dept: HOSPITAL 75 - ER | Age: 41
Discharge: HOME | End: 2021-01-24
Payer: MEDICARE

## 2021-01-24 VITALS — WEIGHT: 201.94 LBS | HEIGHT: 71.73 IN | BODY MASS INDEX: 27.65 KG/M2

## 2021-01-24 VITALS — SYSTOLIC BLOOD PRESSURE: 125 MMHG | DIASTOLIC BLOOD PRESSURE: 83 MMHG

## 2021-01-24 DIAGNOSIS — Z77.22: ICD-10-CM

## 2021-01-24 DIAGNOSIS — Z88.2: ICD-10-CM

## 2021-01-24 DIAGNOSIS — Z86.73: ICD-10-CM

## 2021-01-24 DIAGNOSIS — R07.9: ICD-10-CM

## 2021-01-24 DIAGNOSIS — R11.2: ICD-10-CM

## 2021-01-24 DIAGNOSIS — Z20.822: ICD-10-CM

## 2021-01-24 DIAGNOSIS — Z88.1: ICD-10-CM

## 2021-01-24 DIAGNOSIS — R10.84: ICD-10-CM

## 2021-01-24 DIAGNOSIS — R19.7: Primary | ICD-10-CM

## 2021-01-24 LAB
ALBUMIN SERPL-MCNC: 4.3 GM/DL (ref 3.2–4.5)
ALP SERPL-CCNC: 103 U/L (ref 40–136)
ALT SERPL-CCNC: 23 U/L (ref 0–55)
AMORPH SED URNS QL MICRO: (no result) /LPF
APTT PPP: YELLOW S
BACTERIA #/AREA URNS HPF: NEGATIVE /HPF
BARBITURATES UR QL: NEGATIVE
BASOPHILS # BLD AUTO: 0.1 10^3/UL (ref 0–0.1)
BASOPHILS NFR BLD AUTO: 1 % (ref 0–10)
BENZODIAZ UR QL SCN: NEGATIVE
BILIRUB SERPL-MCNC: 0.3 MG/DL (ref 0.1–1)
BILIRUB UR QL STRIP: NEGATIVE
BUN/CREAT SERPL: 7
CALCIUM SERPL-MCNC: 8.8 MG/DL (ref 8.5–10.1)
CHLORIDE SERPL-SCNC: 105 MMOL/L (ref 98–107)
CO2 SERPL-SCNC: 24 MMOL/L (ref 21–32)
COCAINE UR QL: NEGATIVE
CREAT SERPL-MCNC: 1.01 MG/DL (ref 0.6–1.3)
EOSINOPHIL # BLD AUTO: 0.4 10^3/UL (ref 0–0.3)
EOSINOPHIL NFR BLD AUTO: 5 % (ref 0–10)
ERYTHROCYTE [SEDIMENTATION RATE] IN BLOOD: 7 MM/HR (ref 0–15)
FIBRINOGEN PPP-MCNC: CLEAR MG/DL
GFR SERPLBLD BASED ON 1.73 SQ M-ARVRAT: > 60 ML/MIN
GLUCOSE SERPL-MCNC: 81 MG/DL (ref 70–105)
GLUCOSE UR STRIP-MCNC: NEGATIVE MG/DL
HCT VFR BLD CALC: 44 % (ref 40–54)
HGB BLD-MCNC: 15.2 G/DL (ref 13.3–17.7)
KETONES UR QL STRIP: NEGATIVE
LEUKOCYTE ESTERASE UR QL STRIP: NEGATIVE
LYMPHOCYTES # BLD AUTO: 2.2 10^3/UL (ref 1–4)
LYMPHOCYTES NFR BLD AUTO: 31 % (ref 12–44)
MANUAL DIFFERENTIAL PERFORMED BLD QL: NO
MCH RBC QN AUTO: 33 PG (ref 25–34)
MCHC RBC AUTO-ENTMCNC: 34 G/DL (ref 32–36)
MCV RBC AUTO: 96 FL (ref 80–99)
METHADONE UR QL SCN: NEGATIVE
METHAMPHETAMINE SCREEN URINE S: NEGATIVE
MONOCYTES # BLD AUTO: 0.6 10^3/UL (ref 0–1)
MONOCYTES NFR BLD AUTO: 9 % (ref 0–12)
NEUTROPHILS # BLD AUTO: 3.9 10^3/UL (ref 1.8–7.8)
NEUTROPHILS NFR BLD AUTO: 54 % (ref 42–75)
NITRITE UR QL STRIP: NEGATIVE
OPIATES UR QL SCN: NEGATIVE
OXYCODONE UR QL: NEGATIVE
PH UR STRIP: 7 [PH] (ref 5–9)
PLATELET # BLD: 269 10^3/UL (ref 130–400)
PMV BLD AUTO: 8.8 FL (ref 9–12.2)
POTASSIUM SERPL-SCNC: 3.3 MMOL/L (ref 3.6–5)
PROPOXYPH UR QL: NEGATIVE
PROT SERPL-MCNC: 7.1 GM/DL (ref 6.4–8.2)
PROT UR QL STRIP: NEGATIVE
RBC #/AREA URNS HPF: (no result) /HPF
SODIUM SERPL-SCNC: 140 MMOL/L (ref 135–145)
SP GR UR STRIP: 1.01 (ref 1.02–1.02)
TRICYCLICS UR QL SCN: NEGATIVE
WBC # BLD AUTO: 7.2 10^3/UL (ref 4.3–11)
WBC #/AREA URNS HPF: (no result) /HPF

## 2021-01-24 PROCEDURE — 86141 C-REACTIVE PROTEIN HS: CPT

## 2021-01-24 PROCEDURE — 80053 COMPREHEN METABOLIC PANEL: CPT

## 2021-01-24 PROCEDURE — 84145 PROCALCITONIN (PCT): CPT

## 2021-01-24 PROCEDURE — 85652 RBC SED RATE AUTOMATED: CPT

## 2021-01-24 PROCEDURE — 93005 ELECTROCARDIOGRAM TRACING: CPT

## 2021-01-24 PROCEDURE — 87804 INFLUENZA ASSAY W/OPTIC: CPT

## 2021-01-24 PROCEDURE — 36415 COLL VENOUS BLD VENIPUNCTURE: CPT

## 2021-01-24 PROCEDURE — 85025 COMPLETE CBC W/AUTO DIFF WBC: CPT

## 2021-01-24 PROCEDURE — 81000 URINALYSIS NONAUTO W/SCOPE: CPT

## 2021-01-24 PROCEDURE — 71045 X-RAY EXAM CHEST 1 VIEW: CPT

## 2021-01-24 PROCEDURE — 83615 LACTATE (LD) (LDH) ENZYME: CPT

## 2021-01-24 PROCEDURE — 99284 EMERGENCY DEPT VISIT MOD MDM: CPT

## 2021-01-24 PROCEDURE — 87635 SARS-COV-2 COVID-19 AMP PRB: CPT

## 2021-01-24 PROCEDURE — 85379 FIBRIN DEGRADATION QUANT: CPT

## 2021-01-24 PROCEDURE — 80306 DRUG TEST PRSMV INSTRMNT: CPT

## 2021-01-24 PROCEDURE — 84484 ASSAY OF TROPONIN QUANT: CPT

## 2021-01-24 PROCEDURE — 74176 CT ABD & PELVIS W/O CONTRAST: CPT

## 2021-01-24 NOTE — ED ABDOMINAL PAIN
General


Chief Complaint:  Abdominal/GI Problems


Stated Complaint:  N/D/V, CHILLS


Nursing Triage Note:  


N/V X1 DAY. HASN'T ATE SINCE YESTERDAY.


Sepsis Screen:  No Definite Risk





History of Present Illness


Date Seen by Provider:  Jan 24, 2021


Time Seen by Provider:  21:35


Initial Comments


40-year-old  male evaluated for 24 hours of persistent nausea, 

vomiting, and diarrhea.  He reports his last episode of vomiting and diarrhea 

was just prior to arrival.  He has a history of diverticulitis but has not had 

an episode for several years.  He denies any known exposure to Covid he has had 

negative test approximately 1 month ago.  He has not taken any medication for 

the vomiting or diarrhea.  He also reports mild left chest pain with some 

radiation into his left arm.  He had a normal heart catheterization Dec 2020 but

denies any other cardiac history he is not currently on any medication for 

cardiac disease, he takes 1 medication for PTSD.  He does take aspirin 81 mg 

daily.  He did not take any aspirin prior to arrival.  No history of diabetes 

and he has no diarrhea diaphoresis. No previous abdominal surgery.


Timing/Duration:  24 Hours


Severity/Quality:  Mild


Location:  Generalized Abdomen, Other (Left chest pain with radiation to left 

arm)


Associated Symptoms:  No Back Pain; Chest Pain; No Diaphoresis, No Fever/Chills,

No Fatigue, No Headache, No Heartburn; Nausea/Vomiting; No Shortness of Air; 

Other (No cough)





Allergies and Home Medications


Allergies


Coded Allergies:  


     Sulfa (Sulfonamide Antibiotics) (Verified  Allergy, Severe, HIVES, 11/4/20)


     levofloxacin (Verified  Allergy, Unknown, 9/2/20)


     doxycycline (Verified  Adverse Reaction, Mild, Vomiting, 9/2/20)





Home Medications


Acetaminophen 500 Mg Tablet, 1,000 MG PO Q8H PRN for PAIN-MILD (1-4), (Reported)


Omeprazole 20 Mg Capsule., 20 MG PO BID


   Prescribed by: SOHAM JAMISON on 12/28/20 5670





Patient Home Medication List


Home Medication List Reviewed:  Yes





Review of Systems


Review of Systems


Constitutional:  no symptoms reported, see HPI


EENTM:  No Symptoms Reported, See HPI


Respiratory:  No Symptoms Reported, See HPI; Denies Cough, Denies Shortness of 

Air


Cardiovascular:  See HPI, Chest Pain


Gastrointestinal:  See HPI; Denies Constipated; Diarrhea, Nausea, Poor Appetite;

Denies Rectal Bleeding; Vomiting


Genitourinary:  No Symptoms Reported, See HPI


Musculoskeletal:  no symptoms reported, see HPI


Skin:  no symptoms reported, see HPI


Psychiatric/Neurological:  See HPI, Other (PTSD)





All Other Systems Reviewed


Negative Unless Noted:  Yes





Past Medical-Social-Family Hx


Past Med/Social Hx:  Reviewed Nursing Past Med/Soc Hx


Patient Social History


Alcohol Beverage of Choice:  Beer


Type Used:  Cigarettes


2nd Hand Smoke Exposure:  Yes


Recent Infectious Disease Expo:  No


Recent Hopitalizations:  No





Immunizations Up To Date


Date of Influenza Vaccine:  Oct 1, 2020





Past Medical History


Surgeries:  Yes (KIDNEY STONES--BASKET REMOVAL, )


Cardiac, Renal, Tonsillectomy, Vasectomy


Respiratory:  No


Cardiac:  No


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


Stroke


Reproductive Disorders:  No


Genitourinary:  Yes (EPIDIDYMITIS)


Kidney Stones


Gastrointestinal:  No


Musculoskeletal:  Yes


Chronic Back Pain


Endocrine:  Yes (hypoglycemia)


HEENT:  Yes (TONSILLECTOMY)


Tonsilitis


Cancer:  No


Psychosocial:  Yes


Anxiety, PTSD


Integumentary:  No


Blood Disorders:  No





Family Medical History





SOCIAL HISTORY:


-ETOH--OCCASIONAL USE


-DRUGS--DENIES USE


-SMOKES 1 PPD





Physical Exam


Vital Signs





Vital Signs - First Documented








 1/24/21





 21:38


 


Temp 36.0


 


Pulse 77


 


Resp 18


 


B/P (MAP) 125/83 (97)


 


O2 Delivery Room Air





Capillary Refill : Less Than 3 Seconds


Height/Weight/BMI


Height: 6'"


Weight: 242lbs. oz. [id number]; 27.00 BMI


Method:Stated


General Appearance:  WD/WN, no apparent distress


HEENT:  PERRL/EOMI, normal ENT inspection, TMs normal, pharynx normal


Neck:  non-tender, full range of motion, supple, normal inspection


Respiratory:  chest non-tender, lungs clear, normal breath sounds, no 

respiratory distress


Cardiovascular:  normal peripheral pulses, regular rate, rhythm, no edema, no 

murmur


Gastrointestinal:  normal bowel sounds, soft; No distended, No guarding, No 

rebound; tenderness (Generalized, increased LLQ)


Extremities:  normal range of motion, non-tender, normal inspection, normal 

capillary refill


Neurologic/Psychiatric:  no motor/sensory deficits, alert, normal mood/affect, 

oriented x 3


Skin:  normal color, warm/dry





Progress/Results/Core Measures


Results/Orders


Lab Results





Laboratory Tests








Test


 1/24/21


21:19 1/24/21


21:25 1/24/21


22:18 Range/Units


 


 


Coronavirus 2019 ([facility]) Negative    Negative  


 


White Blood Count


 


 7.2 


 


 4.3-11.0


10^3/uL


 


Red Blood Count


 


 4.61 


 


 4.30-5.52


10^6/uL


 


Hemoglobin  15.2   13.3-17.7  g/dL


 


Hematocrit  44   40-54  %


 


Mean Corpuscular Volume  96   80-99  fL


 


Mean Corpuscular Hemoglobin  33   25-34  pg


 


Mean Corpuscular Hemoglobin


Concent 


 34 


 


 32-36  g/dL





 


Red Cell Distribution Width  12.5   10.0-14.5  %


 


Platelet Count


 


 269 


 


 130-400


10^3/uL


 


Mean Platelet Volume  8.8 L  9.0-12.2  fL


 


Immature Granulocyte % (Auto)  0    %


 


Neutrophils (%) (Auto)  54   42-75  %


 


Lymphocytes (%) (Auto)  31   12-44  %


 


Monocytes (%) (Auto)  9   0-12  %


 


Eosinophils (%) (Auto)  5   0-10  %


 


Basophils (%) (Auto)  1   0-10  %


 


Neutrophils # (Auto)


 


 3.9 


 


 1.8-7.8


10^3/uL


 


Lymphocytes # (Auto)


 


 2.2 


 


 1.0-4.0


10^3/uL


 


Monocytes # (Auto)


 


 0.6 


 


 0.0-1.0


10^3/uL


 


Eosinophils # (Auto)


 


 0.4 H


 


 0.0-0.3


10^3/uL


 


Basophils # (Auto)


 


 0.1 


 


 0.0-0.1


10^3/uL


 


Immature Granulocyte # (Auto)


 


 0.0 


 


 0.0-0.1


10^3/uL


 


Erythrocyte Sedimentation Rate  7   0-15  MM/HR


 


D-Dimer


 


 0.33 


 


 0.00-0.49


UG/ML


 


Sodium Level  140   135-145  MMOL/L


 


Potassium Level  3.3 L  3.6-5.0  MMOL/L


 


Chloride Level  105     MMOL/L


 


Carbon Dioxide Level  24   21-32  MMOL/L


 


Anion Gap  11   5-14  MMOL/L


 


Blood Urea Nitrogen  7   7-18  MG/DL


 


Creatinine


 


 1.01 


 


 0.60-1.30


MG/DL


 


Estimat Glomerular Filtration


Rate 


 > 60 


 


  





 


BUN/Creatinine Ratio  7    


 


Glucose Level  81     MG/DL


 


Calcium Level  8.8   8.5-10.1  MG/DL


 


Corrected Calcium  8.6   8.5-10.1  MG/DL


 


Total Bilirubin  0.3   0.1-1.0  MG/DL


 


Aspartate Amino Transf


(AST/SGOT) 


 17 


 


 5-34  U/L





 


Alanine Aminotransferase


(ALT/SGPT) 


 23 


 


 0-55  U/L





 


Alkaline Phosphatase  103     U/L


 


Lactate Dehydrogenase  165   125-220  U/L


 


Troponin I  < 0.028   <0.028  NG/ML


 


C-Reactive Protein High


Sensitivity 


 0.28 


 


 0.00-0.50


MG/DL


 


Total Protein  7.1   6.4-8.2  GM/DL


 


Albumin  4.3   3.2-4.5  GM/DL


 


Procalcitonin  0.02   <0.10  NG/ML


 


Urine Color   YELLOW   


 


Urine Clarity   CLEAR   


 


Urine pH   7.0  5-9  


 


Urine Specific Gravity   1.015 L 1.016-1.022  


 


Urine Protein   NEGATIVE  NEGATIVE  


 


Urine Glucose (UA)   NEGATIVE  NEGATIVE  


 


Urine Ketones   NEGATIVE  NEGATIVE  


 


Urine Nitrite   NEGATIVE  NEGATIVE  


 


Urine Bilirubin   NEGATIVE  NEGATIVE  


 


Urine Urobilinogen   1.0  < = 1.0  MG/DL


 


Urine Leukocyte Esterase   NEGATIVE  NEGATIVE  


 


Urine RBC (Auto)   NEGATIVE  NEGATIVE  


 


Urine RBC   NONE   /HPF


 


Urine WBC   NONE   /HPF


 


Urine Crystals   PRESENT H  /LPF


 


Urine Amorphous Sediment


 


 


 LARGE AGGIE


URATES H  /LPF





 


Urine Bacteria   NEGATIVE   /HPF


 


Urine Casts   NONE   /LPF


 


Urine Mucus   NEGATIVE   /LPF


 


Urine Culture Indicated   NO   


 


Urine Opiates Screen   NEGATIVE  NEGATIVE  


 


Urine Oxycodone Screen   NEGATIVE  NEGATIVE  


 


Urine Methadone Screen   NEGATIVE  NEGATIVE  


 


Urine Propoxyphene Screen   NEGATIVE  NEGATIVE  


 


Urine Barbiturates Screen   NEGATIVE  NEGATIVE  


 


Ur Tricyclic Antidepressants


Screen 


 


 NEGATIVE 


 NEGATIVE  





 


Urine Phencyclidine Screen   NEGATIVE  NEGATIVE  


 


Urine Amphetamines Screen   NEGATIVE  NEGATIVE  


 


Urine Methamphetamines Screen   NEGATIVE  NEGATIVE  


 


Urine Benzodiazepines Screen   NEGATIVE  NEGATIVE  


 


Urine Cocaine Screen   NEGATIVE  NEGATIVE  


 


Urine Cannabinoids Screen   NEGATIVE  NEGATIVE  








Micro Results





Microbiology


1/24/21 Influenza Types A,B Antigen (JOSE EDUARDO) - Final, Complete


          





My Orders





Orders - FAUSTO STEWART


Ua Culture If Indicated (1/24/21 20:54)


Coronavirus Sars-Cov-2 So 2019 (1/24/21 20:58)


Covid 19 Inhouse Test (1/24/21 20:58)


Ed Iv/Invasive Line Start (1/24/21 21:22)


Lactated Ringers (Lr 1000 Ml Iv Solution (1/24/21 21:30)


Cbc With Automated Diff (1/24/21 21:24)


Comprehensive Metabolic Panel (1/24/21 21:24)


Fibrin Degradation Products (1/24/21 21:24)


Procalcitonin (Pct) (1/24/21 21:24)


Hs C Reactive Protein (1/24/21 21:24)


Erythrocyte Sedimentation Rate (1/24/21 21:24)


LDH (1/24/21 21:24)


Influenza A And B Antigens (1/24/21 21:24)


Ondansetron Injection (Zofran Injectio (1/24/21 21:20)


Chest 1 View, Ap/Pa Only (1/24/21 21:38)


Troponin I (1/24/21 21:39)


Aspirin Chewable Tablet (Baby Aspirin Ch (1/24/21 21:45)


Fentanyl  Injection (Sublimaze Injection (1/24/21 21:34)


Loperamide Tablet (Imodium Tablet) (1/24/21 21:34)


Fentanyl  Injection (Sublimaze Injection (1/24/21 21:45)


Loperamide Tablet (Imodium Tablet) (1/24/21 21:41)


Ct Abdomen/Pelvis Wo (1/24/21 21:54)


Lidocaine 2% Viscous 15 Ml (Xylocaine Vi (1/24/21 22:30)


Antacid  Suspension (Mylanta  Suspension (1/24/21 22:30)


Drug Screen Stat (Urine) (1/24/21 22:29)


Lidocaine 2% Viscous 15 Ml (Xylocaine Vi (1/24/21 22:22)


Antacid  Suspension (Mylanta  Suspension (1/24/21 22:22)


Ekg Tracing (1/24/21 22:36)


Pantoprazole Injection (Protonix Injecti (1/24/21 23:00)





Medications Given in ED





Current Medications








 Medications  Dose


 Ordered  Sig/Duy


 Route  Start Time


 Stop Time Status Last Admin


Dose Admin


 


 Al Hydrox/Mg


 Hydrox/Simethicone  30 ml  ONCE  ONCE


 PO  1/24/21 22:30


 1/24/21 22:33 DC 1/24/21 22:28


30 ML


 


 Aspirin  324 mg  ONCE  ONCE


 PO  1/24/21 21:45


 1/24/21 21:46 DC 1/24/21 21:45


324 MG


 


 Fentanyl Citrate  50 mcg  ONCE  ONCE


 IVP  1/24/21 21:45


 1/24/21 21:46 DC 1/24/21 21:43


50 MCG


 


 Lactated Ringer's  1,000 ml @ 


 0 mls/hr  Q0M ONCE


 IV  1/24/21 21:30


 1/24/21 21:31 DC 1/24/21 21:31


1,000 MLS/HR


 


 Lidocaine HCl  15 ml  ONCE  ONCE


 PO  1/24/21 22:30


 1/24/21 22:33 DC 1/24/21 22:28


15 ML


 


 Ondansetron HCl  4 mg  STK-MED ONCE


 .ROUTE  1/24/21 21:20


 1/24/21 21:25 DC 1/24/21 21:31


8 MG


 


 Pantoprazole  40 mg  ONCE  ONCE


 IV  1/24/21 23:00


 1/24/21 23:01 DC 1/24/21 22:57


40 MG








Vital Signs/I&O











 1/24/21





 21:38


 


Temp 36.0


 


Pulse 77


 


Resp 18


 


B/P (MAP) 125/83 (97)


 


O2 Delivery Room Air














Blood Pressure Mean:                    97











Progress


Progress Note :  


   Time:  21:35


Progress Note


Patient seen and evaluated, will give Imodium AD, LR 1 L, aspirin 324 mg orally,

Zofran 8 mg IV, and fentanyl 50 mcg IV.  Will obtain chest x-ray, labs, CT 

abdomen and pelvis.


2210 CT and chest x-ray completed, patient reports continued abdominal and chest

pain, will give GI Cocktail. 


2300 patient experienced no vomiting or diarrhea since admitted ED. Discharge 

instructions and return precautions reviewed with the patient.


Initial ECG Impression Date:  Jan 24, 2021


Initial ECG Impression Time:  21:30


Initial ECG Rate:  78


Initial ECG Rhythm:  Normal Sinus


Initial ECG Intervals:  Normal


Initial ECG Intervals


, QRSD 98, , QTc 479.  Axis P 52, QRS -23, T 35.


Initial ECG Impression:  Normal


Initial ECG Comparisson:  Unchanged





Diagnostic Imaging





   Diagonstic Imaging:  Xray


   Plain Films/CT/US/NM/MRI:  chest


Comments


Clear Chest, no acute findings, will be over-read by radiology tomorrow.


   Reviewed:  Reviewed by Me








   Diagonstic Imaging:  CT


   Plain Films/CT/US/NM/MRI:  abdomen, pelvis


Comments


Per [facility] CT abdomen and pelvis shows no free air or free fluid or bowel 

obstruction.  Nonobstructing calculus within the left kidney, normal appendix.  

No other acute findings.


   Reviewed:  Reviewed by Me





Departure


Impression





   Primary Impression:  


   Diarrhea


   Qualified Codes:  R19.7 - Diarrhea, unspecified


   Additional Impressions:  


   Nausea and vomiting


   Qualified Codes:  R11.2 - Nausea with vomiting, unspecified


   Abdominal pain


   Qualified Codes:  R10.84 - Generalized abdominal pain


   Chest pain


   Qualified Codes:  R07.9 - Chest pain, unspecified


Disposition:  01 HOME, SELF-CARE


Condition:  Stable





Departure-Patient Inst.


Decision time for Depature:  23:00


Referrals:  


[facility] (PCP)


Primary Care Physician








HENOK JULIAN (Family)


Primary Care Physician


Patient Instructions:  Severe Abdominal Pain, Adult (DC), Nausea and Vomiting, 

Adult (DC)





Add. Discharge Instructions:  


Clear liquid diet for 6-8 hours, then bland diet, progress as tolerated. 


Alternate between Tylenol 650 mg and ibuprofen 600 mg every 4 hours as needed 

for pain.


Follow-up with your primary care provider tomorrow if symptoms are not improving

or worsen.


Take the Protonix that was prescribed to you at your last ER visit.


Continue to use your home medications as prescribed.


Return to the emergency department for new, urgent healthcare needs.





All discharge instructions reviewed with patient and/or family. Voiced 

understanding.


Work/School Note:  Work Release Form   Date Seen in the Emergency Department:  

Jan 24, 2021


   Return to Work:  Jan 26, 2021


   Restrictions:  No Restrictions











FAUSTO STEWART                  Jan 24, 2021 22:02 90

## 2021-01-24 NOTE — NUR
ATTEMPTED TO CALL PHONE NUMBER ON CHART AND EMERGENCY CONTACT. DID NOT LEAVE A 
NUMBER WITH THE REGISTRATION. REGISTRATION IS TO NOTIFY ME WHEN PATIENT OR 
EMERGENCY CONTACT RETURNS TO THE BUILDING.

## 2021-01-25 NOTE — DIAGNOSTIC IMAGING REPORT
INDICATION: Chest pain



COMPARISON: 12/28/2020 



FINDINGS: Single view of the chest demonstrates clear lungs

bilaterally. The heart is normal. There is no pneumothorax. The

osseous structures are normal.



IMPRESSION: Negative chest



Dictated by: 



  Dictated on workstation # [id number]

## 2021-01-25 NOTE — DIAGNOSTIC IMAGING REPORT
EXAMINATION: CT Abdomen Pelvis without contrast.



TECHNIQUE: Multiple contiguous axial images were obtained through

the abdomen and pelvis without the use of intravenous contrast.

All CT scans use one or more of the following dose optimizing

techniques: automated exposure control, MA and/or KvP adjustment

based on a patient size and exam type, or iterative

reconstruction. 



HISTORY: abd pain, hx diverticulitis, N/V/D 24 hours



COMPARISON: 12/28/2020



FINDINGS: 



Lung bases: Bibasilar dependent atelectasis.



Solid organs: The liver is normal. The gallbladder is normal.

There is no biliary ductal dilation. Pancreas is normal.  Spleen

is normal. Adrenal glands are normal. A 1 mm nonobstructing left

renal calculus. No hydronephrosis.



Bowel: The stomach and small bowel are normal without

obstruction. The colon and appendix are normal. 



Peritoneum: There is no intraperitoneal free fluid or free air.

No suspicious lymphadenopathy. 



Vasculature: Normal without aneurysm.



Musculoskeletal: Degenerative changes of the spine without

suspicious osseous lesion or compression fracture.



Pelvis: The prostate gland is normal. The urinary bladder is

normal.



IMPRESSION:



1. No acute abnormality in the abdomen or pelvis.

2. A 1 mm nonobstructing left renal calculus without

hydronephrosis.

3. Agree with preliminary interpretation.



Dictated by: 



  Dictated on workstation # [id number]

## 2021-05-25 ENCOUNTER — HOSPITAL ENCOUNTER (EMERGENCY)
Dept: HOSPITAL 75 - ER | Age: 41
Discharge: HOME | End: 2021-05-25
Payer: MEDICARE

## 2021-05-25 VITALS — DIASTOLIC BLOOD PRESSURE: 71 MMHG | SYSTOLIC BLOOD PRESSURE: 117 MMHG

## 2021-05-25 VITALS — BODY MASS INDEX: 29.48 KG/M2 | WEIGHT: 222.45 LBS | HEIGHT: 72.99 IN

## 2021-05-25 DIAGNOSIS — Z87.442: ICD-10-CM

## 2021-05-25 DIAGNOSIS — R10.32: Primary | ICD-10-CM

## 2021-05-25 DIAGNOSIS — F17.210: ICD-10-CM

## 2021-05-25 DIAGNOSIS — M54.9: ICD-10-CM

## 2021-05-25 DIAGNOSIS — R11.0: ICD-10-CM

## 2021-05-25 DIAGNOSIS — J40: ICD-10-CM

## 2021-05-25 DIAGNOSIS — G89.29: ICD-10-CM

## 2021-05-25 LAB
ALBUMIN SERPL-MCNC: 3.9 GM/DL (ref 3.2–4.5)
ALP SERPL-CCNC: 87 U/L (ref 40–136)
ALT SERPL-CCNC: 32 U/L (ref 0–55)
BASOPHILS # BLD AUTO: 0.1 10^3/UL (ref 0–0.1)
BASOPHILS NFR BLD AUTO: 1 % (ref 0–10)
BILIRUB SERPL-MCNC: 0.6 MG/DL (ref 0.1–1)
BUN/CREAT SERPL: 6
CALCIUM SERPL-MCNC: 8.1 MG/DL (ref 8.5–10.1)
CHLORIDE SERPL-SCNC: 110 MMOL/L (ref 98–107)
CO2 SERPL-SCNC: 23 MMOL/L (ref 21–32)
CREAT SERPL-MCNC: 1.02 MG/DL (ref 0.6–1.3)
EOSINOPHIL # BLD AUTO: 0.2 10^3/UL (ref 0–0.3)
EOSINOPHIL NFR BLD AUTO: 3 % (ref 0–10)
GFR SERPLBLD BASED ON 1.73 SQ M-ARVRAT: > 60 ML/MIN
GLUCOSE SERPL-MCNC: 81 MG/DL (ref 70–105)
HCT VFR BLD CALC: 51 % (ref 40–54)
HGB BLD-MCNC: 16.9 G/DL (ref 13.3–17.7)
LYMPHOCYTES # BLD AUTO: 1.3 10^3/UL (ref 1–4)
LYMPHOCYTES NFR BLD AUTO: 20 % (ref 12–44)
MANUAL DIFFERENTIAL PERFORMED BLD QL: NO
MCH RBC QN AUTO: 32 PG (ref 25–34)
MCHC RBC AUTO-ENTMCNC: 33 G/DL (ref 32–36)
MCV RBC AUTO: 96 FL (ref 80–99)
MONOCYTES # BLD AUTO: 0.8 10^3/UL (ref 0–1)
MONOCYTES NFR BLD AUTO: 13 % (ref 0–12)
NEUTROPHILS # BLD AUTO: 4 10^3/UL (ref 1.8–7.8)
NEUTROPHILS NFR BLD AUTO: 63 % (ref 42–75)
PLATELET # BLD: 281 10^3/UL (ref 130–400)
PMV BLD AUTO: 9.3 FL (ref 9–12.2)
POTASSIUM SERPL-SCNC: 3.5 MMOL/L (ref 3.6–5)
PROT SERPL-MCNC: 6.6 GM/DL (ref 6.4–8.2)
SODIUM SERPL-SCNC: 143 MMOL/L (ref 135–145)
WBC # BLD AUTO: 6.3 10^3/UL (ref 4.3–11)

## 2021-05-25 PROCEDURE — 74176 CT ABD & PELVIS W/O CONTRAST: CPT

## 2021-05-25 PROCEDURE — 80053 COMPREHEN METABOLIC PANEL: CPT

## 2021-05-25 PROCEDURE — 36415 COLL VENOUS BLD VENIPUNCTURE: CPT

## 2021-05-25 PROCEDURE — 85025 COMPLETE CBC W/AUTO DIFF WBC: CPT

## 2021-05-25 NOTE — DIAGNOSTIC IMAGING REPORT
EXAMINATION: CT abdomen and pelvis without contrast.



TECHNIQUE: Multiple contiguous axial images were obtained through

the abdomen and pelvis without the use of intravenous contrast.

All CT scans use one or more of the following dose optimizing

techniques: automated exposure control, MA and/or KvP adjustment

based on patient size and exam type or iterative reconstruction. 



HISTORY: Abdominal pain and bloody diarrhea.



COMPARISON: 01/24/2021



FINDINGS: 



Limited views of the lower thorax are unremarkable.



The liver is normal without focal lesion. There is no biliary

ductal dilation. Gallbladder is normal.  Pancreas is normal. 

Spleen is normal. Adrenal glands are normal.



The kidneys are normal. There is no hydronephrosis. Urinary

bladder is normal.



Visualized bowel is normal in caliber without obstruction or

inflammation. The appendix is normal. There is a small

fat-containing umbilical hernia. No free fluid or air. No

abdominal or pelvic lymphadenopathy. Aorta is normal in caliber

without aneurysm.



There are no suspicious osseous lesions.



IMPRESSION:



1. No acute abnormality in the abdomen or pelvis.



Dictated by: 



  Dictated on workstation # [id number]

## 2021-05-25 NOTE — ED ABDOMINAL PAIN
General


Chief Complaint:  Abdominal/GI Problems


Stated Complaint:  VOMITTING


Nursing Triage Note:  


TO ED PER EMS FROM HOME HAS BEEN VOMITING FOR 5 DAYS WAS SEEN AT [facility]




5/23  DID NOT CALL ANY MEDS FOR NAUSEA IN. CON'T T VOMIT.


Sepsis Screen:  No Definite Risk


Source of Information:  Patient


Exam Limitations:  No Limitations





History of Present Illness


Date Seen by Provider:  May 25, 2021


Time Seen by Provider:  10:45


Initial Comments


Patient is a 41-year-old male who presents to the emergency department today 

with a chief complaint of left lower quadrant abdominal pain, shortness of 

breath and cough, nausea and vomiting.  Patient states his symptoms have been 

ongoing for about a week.  He was seen in the emergency room at [facility] 

on the 23rd with similar complaints.  He was given a prescription but has not 

been able to pick it up until today.  Patient states that he continues to feel 

weak and dizzy and have cough productive of clearish sputum.  Patient states he 

had a little bit of blood in his sputum yesterday.  He is a smoker but has not 

really smoked since he has been sick.  He denies fevers and chills but states 

that he feels "hot today".  He denies any problems with bowel or bladder 

although he states he has had a little bit of blood and some diarrhea in the 

last few days.  He states that he is up-to-date on his vaccinations for Covid.  

He received his second Covid vaccination at the end of April.





All other review of systems reviewed and negative except as stated.


Timing/Duration:  1 Week


Severity/Quality:  Moderate, Cramping


Location:  Q


Radiation:  No Radiation


Activities at Onset:  None


Associated Symptoms:  Nausea/Vomiting, Weakness





Allergies and Home Medications


Allergies


Coded Allergies:  


     Sulfa (Sulfonamide Antibiotics) (Verified  Allergy, Severe, HIVES, 11/4/20)


     levofloxacin (Verified  Allergy, Unknown, 9/2/20)


     doxycycline (Verified  Adverse Reaction, Mild, Vomiting, 9/2/20)





Home Medications


Acetaminophen 500 Mg Tablet, 1,000 MG PO Q8H PRN for PAIN-MILD (1-4), (Reported)


Omeprazole 20 Mg Capsule.dr 20 MG PO BID


   Prescribed by: SOHAM JAMISON on 12/28/20 8101





Patient Home Medication List


Home Medication List Reviewed:  Yes





Review of Systems


Review of Systems


Constitutional:  see HPI


EENTM:  No Symptoms Reported


Respiratory:  Cough


Cardiovascular:  No Symptoms Reported


Gastrointestinal:  Abdominal Pain, Blood Streaked Stools, Nausea, Poor Appetite,

Vomiting


Genitourinary:  No Symptoms Reported


Musculoskeletal:  no symptoms reported


Skin:  no symptoms reported





All Other Systems Reviewed


Negative Unless Noted:  Yes





Past Medical-Social-Family Hx


Patient Social History


Alcohol Use:  Occasionally Uses


Number of Drinks Today:  AA


Alcohol Beverage of Choice:  Beer


Smoking Status:  Current Everyday Smoker


Type Used:  Cigarettes


2nd Hand Smoke Exposure:  Yes


Recent Infectious Disease Expo:  No


Recent Hopitalizations:  No





Immunizations Up To Date


Date of Influenza Vaccine:  Oct 1, 2020





Past Medical History


Surgeries:  Yes (KIDNEY STONES--BASKET REMOVAL, )


Cardiac, Renal, Tonsillectomy, Vasectomy


Respiratory:  No


Cardiac:  No


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


Stroke


Reproductive Disorders:  No


Genitourinary:  Yes (EPIDIDYMITIS)


Kidney Stones


Gastrointestinal:  No


Musculoskeletal:  Yes


Chronic Back Pain


Endocrine:  Yes (hypoglycemia)


HEENT:  Yes (TONSILLECTOMY)


Tonsilitis


Cancer:  No


Psychosocial:  Yes


Anxiety, PTSD


Integumentary:  No


Blood Disorders:  No





Family Medical History





SOCIAL HISTORY:


-ETOH--OCCASIONAL USE


-DRUGS--DENIES USE


-SMOKES 1 PPD





Physical Exam


Vital Signs





Vital Signs - First Documented








 5/25/21





 10:21


 


Temp 35.9


 


Pulse 73


 


Resp 18


 


B/P (MAP) 140/93 (109)


 


Pulse Ox 97


 


O2 Delivery Room Air





Capillary Refill : Less Than 3 Seconds


Height/Weight/BMI


Height: 6'"


Weight: 242lbs. oz. 109.939147nl; 29.00 BMI


Method:Stated


General Appearance:  WD/WN, mild distress


HEENT:  normal ENT inspection


Neck:  normal inspection


Respiratory:  lungs clear, normal breath sounds, no respiratory distress, no 

accessory muscle use


Cardiovascular:  regular rate, rhythm


Gastrointestinal:  soft, tenderness (Left lower quadrant)


Back:  normal inspection


Neurologic/Psychiatric:  alert, normal mood/affect, oriented x 3


Skin:  normal color, warm/dry





Progress/Results/Core Measures


Results/Orders


Lab Results





Laboratory Tests








Test


 5/25/21


10:15 Range/Units


 


 


White Blood Count


 6.3 


 4.3-11.0


10^3/uL


 


Red Blood Count


 5.33 


 4.30-5.52


10^6/uL


 


Hemoglobin 16.9  13.3-17.7  g/dL


 


Hematocrit 51  40-54  %


 


Mean Corpuscular Volume 96  80-99  fL


 


Mean Corpuscular Hemoglobin 32  25-34  pg


 


Mean Corpuscular Hemoglobin


Concent 33 


 32-36  g/dL





 


Red Cell Distribution Width 13.1  10.0-14.5  %


 


Platelet Count


 281 


 130-400


10^3/uL


 


Mean Platelet Volume 9.3  9.0-12.2  fL


 


Immature Granulocyte % (Auto) 0   %


 


Neutrophils (%) (Auto) 63  42-75  %


 


Lymphocytes (%) (Auto) 20  12-44  %


 


Monocytes (%) (Auto) 13 H 0-12  %


 


Eosinophils (%) (Auto) 3  0-10  %


 


Basophils (%) (Auto) 1  0-10  %


 


Neutrophils # (Auto)


 4.0 


 1.8-7.8


10^3/uL


 


Lymphocytes # (Auto)


 1.3 


 1.0-4.0


10^3/uL


 


Monocytes # (Auto)


 0.8 


 0.0-1.0


10^3/uL


 


Eosinophils # (Auto)


 0.2 


 0.0-0.3


10^3/uL


 


Basophils # (Auto)


 0.1 


 0.0-0.1


10^3/uL


 


Immature Granulocyte # (Auto)


 0.0 


 0.0-0.1


10^3/uL


 


Sodium Level 143  135-145  MMOL/L


 


Potassium Level 3.5 L 3.6-5.0  MMOL/L


 


Chloride Level 110 H   MMOL/L


 


Carbon Dioxide Level 23  21-32  MMOL/L


 


Anion Gap 10  5-14  MMOL/L


 


Blood Urea Nitrogen 6 L 7-18  MG/DL


 


Creatinine


 1.02 


 0.60-1.30


MG/DL


 


Estimat Glomerular Filtration


Rate > 60 


  





 


BUN/Creatinine Ratio 6   


 


Glucose Level 81    MG/DL


 


Calcium Level 8.1 L 8.5-10.1  MG/DL


 


Corrected Calcium 8.2 L 8.5-10.1  MG/DL


 


Total Bilirubin 0.6  0.1-1.0  MG/DL


 


Aspartate Amino Transf


(AST/SGOT) 23 


 5-34  U/L





 


Alanine Aminotransferase


(ALT/SGPT) 32 


 0-55  U/L





 


Alkaline Phosphatase 87    U/L


 


Total Protein 6.6  6.4-8.2  GM/DL


 


Albumin 3.9  3.2-4.5  GM/DL








My Orders





Orders - JESSICA MCGREGOR MD


Ed Iv/Invasive Line Start (5/25/21 11:17)


Cbc With Automated Diff (5/25/21 11:17)


Comprehensive Metabolic Panel (5/25/21 11:17)


Ct Abdomen/Pelvis Wo (5/25/21 11:17)


Ns Iv 1000 Ml (Sodium Chloride 0.9%) (5/25/21 11:30)


Prochlorperazine Injection (Compazine In (5/25/21 11:30)


Diphenhydramine Injection (Benadryl Inje (5/25/21 11:30)


Ketorolac Injection (Toradol Injection) (5/25/21 11:30)





Medications Given in ED





Current Medications








 Medications  Dose


 Ordered  Sig/Duy


 Route  Start Time


 Stop Time Status Last Admin


Dose Admin


 


 Diphenhydramine


 HCl  25 mg  ONCE  ONCE


 IV  5/25/21 11:30


 5/25/21 11:31 DC 5/25/21 11:31


25 MG


 


 Ketorolac


 Tromethamine  30 mg  ONCE  ONCE


 IVP  5/25/21 11:30


 5/25/21 11:31 DC 5/25/21 11:31


30 MG


 


 Prochlorperazine


 Edisylate  10 mg  ONCE  ONCE


 IV  5/25/21 11:30


 5/25/21 11:31 DC 5/25/21 11:27


10 MG








Vital Signs/I&O











 5/25/21





 10:21


 


Temp 35.9


 


Pulse 73


 


Resp 18


 


B/P (MAP) 140/93 (109)


 


Pulse Ox 97


 


O2 Delivery Room Air














Blood Pressure Mean:                    109











Progress


Progress Note :  


   Time:  12:13


Progress Note


Patient feels a little better.  Is able to lay on his left side comfortably.  He

still has a slight bit of nausea.  But overall improved.  I recommended that the

patient obtain some over-the-counter Robitussin for his cough.  He verbalizes 

understanding.  He can take over-the-counter ibuprofen and Tylenol as needed for

headache, body aches and other pains.  I am going to send him home with a 

prescription for Zofran for his nausea.  He verbalizes understanding.  All 

questions are sought and answered.  Patient is stable for discharge.





Departure


Impression





   Primary Impression:  


   Abdominal pain


   Qualified Codes:  R10.32 - Left lower quadrant pain


   Additional Impression:  


   Bronchitis


Disposition:  01 HOME, SELF-CARE


Condition:  Stable





Departure-Patient Inst.


Decision time for Depature:  12:14


Referrals:  


[facility] (PCP)


Primary Care Physician








HENOK JULIAN (Family)


Primary Care Physician


Patient Instructions:  Acute Bronchitis, Nausea and Vomiting, Adult ED





Add. Discharge Instructions:  


Take over-the-counter Robitussin as needed/directed for coughing fits.





Take the Zofran every 8 hours as needed for nausea.





You can take Tylenol and/or ibuprofen for any further abdominal pain.  Always 

take ibuprofen with food.





Return to the emergency room for any new, concerning or emergent complaints, 

worsening abdominal pain fever or other concerns.





Please call and follow-up with Dr. Julian.


Scripts


Ondansetron (Ondansetron Odt) 4 Mg Tab.rapdis


4 MG PO Q8H PRN for nausea, #15 TAB


   Prov: JESSICA MCGREGOR MD         5/25/21











JESSICA MCGREGOR MD         May 25, 2021 10:45

## 2021-05-28 ENCOUNTER — HOSPITAL ENCOUNTER (EMERGENCY)
Dept: HOSPITAL 75 - ER | Age: 41
LOS: 1 days | Discharge: HOME | End: 2021-05-29
Payer: COMMERCIAL

## 2021-05-28 VITALS — HEIGHT: 73.03 IN | WEIGHT: 231.93 LBS | BODY MASS INDEX: 30.74 KG/M2

## 2021-05-28 DIAGNOSIS — M54.9: ICD-10-CM
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
## 2021-05-28 DIAGNOSIS — R11.2: ICD-10-CM

## 2021-05-28 DIAGNOSIS — R05: ICD-10-CM

## 2021-05-28 DIAGNOSIS — Z72.89: ICD-10-CM

## 2021-05-28 DIAGNOSIS — R55: ICD-10-CM

## 2021-05-28 DIAGNOSIS — F12.90: ICD-10-CM

## 2021-05-28 DIAGNOSIS — G89.29: ICD-10-CM

## 2021-05-28 DIAGNOSIS — S06.9X9A: Primary | ICD-10-CM

## 2021-05-28 DIAGNOSIS — Z79.899: ICD-10-CM

## 2021-05-28 DIAGNOSIS — Z86.69: ICD-10-CM

## 2021-05-28 DIAGNOSIS — F17.210: ICD-10-CM

## 2021-05-28 LAB
ALBUMIN SERPL-MCNC: 4.6 GM/DL (ref 3.2–4.5)
ALP SERPL-CCNC: 98 U/L (ref 40–136)
ALT SERPL-CCNC: 40 U/L (ref 0–55)
AMYLASE SERPL-CCNC: 48 U/L (ref 25–125)
APAP SERPL-MCNC: < 10 UG/ML (ref 10–30)
APTT PPP: YELLOW S
BACTERIA #/AREA URNS HPF: NEGATIVE /HPF
BARBITURATES UR QL: NEGATIVE
BASOPHILS # BLD AUTO: 0.1 10^3/UL (ref 0–0.1)
BASOPHILS NFR BLD AUTO: 1 % (ref 0–10)
BENZODIAZ UR QL SCN: NEGATIVE
BILIRUB SERPL-MCNC: 0.5 MG/DL (ref 0.1–1)
BILIRUB UR QL STRIP: NEGATIVE
BUN/CREAT SERPL: 6
CALCIUM SERPL-MCNC: 9.2 MG/DL (ref 8.5–10.1)
CHLORIDE SERPL-SCNC: 103 MMOL/L (ref 98–107)
CK MB SERPL-MCNC: 1.8 NG/ML (ref ?–6.6)
CK SERPL-CCNC: 609 U/L (ref 30–200)
CO2 SERPL-SCNC: 19 MMOL/L (ref 21–32)
COCAINE UR QL: NEGATIVE
CREAT SERPL-MCNC: 1.05 MG/DL (ref 0.6–1.3)
EOSINOPHIL # BLD AUTO: 0.3 10^3/UL (ref 0–0.3)
EOSINOPHIL NFR BLD AUTO: 5 % (ref 0–10)
FIBRINOGEN PPP-MCNC: CLEAR MG/DL
GFR SERPLBLD BASED ON 1.73 SQ M-ARVRAT: > 60 ML/MIN
GLUCOSE SERPL-MCNC: 99 MG/DL (ref 70–105)
GLUCOSE UR STRIP-MCNC: NEGATIVE MG/DL
HCT VFR BLD CALC: 50 % (ref 40–54)
HGB BLD-MCNC: 17 G/DL (ref 13.3–17.7)
HYALINE CASTS #/AREA URNS LPF: (no result) /LPF
KETONES UR QL STRIP: NEGATIVE
LEUKOCYTE ESTERASE UR QL STRIP: NEGATIVE
LIPASE SERPL-CCNC: 22 U/L (ref 8–78)
LYMPHOCYTES # BLD AUTO: 2 10^3/UL (ref 1–4)
LYMPHOCYTES NFR BLD AUTO: 28 % (ref 12–44)
MAGNESIUM SERPL-MCNC: 2.3 MG/DL (ref 1.6–2.4)
MANUAL DIFFERENTIAL PERFORMED BLD QL: NO
MCH RBC QN AUTO: 32 PG (ref 25–34)
MCHC RBC AUTO-ENTMCNC: 34 G/DL (ref 32–36)
MCV RBC AUTO: 94 FL (ref 80–99)
METHADONE UR QL SCN: NEGATIVE
METHAMPHETAMINE SCREEN URINE S: NEGATIVE
MONOCYTES # BLD AUTO: 0.5 10^3/UL (ref 0–1)
MONOCYTES NFR BLD AUTO: 7 % (ref 0–12)
NEUTROPHILS # BLD AUTO: 4.4 10^3/UL (ref 1.8–7.8)
NEUTROPHILS NFR BLD AUTO: 60 % (ref 42–75)
NITRITE UR QL STRIP: NEGATIVE
OPIATES UR QL SCN: NEGATIVE
OXYCODONE UR QL: NEGATIVE
PH UR STRIP: 5.5 [PH] (ref 5–9)
PLATELET # BLD: 283 10^3/UL (ref 130–400)
PMV BLD AUTO: 8.7 FL (ref 9–12.2)
POTASSIUM SERPL-SCNC: 3.4 MMOL/L (ref 3.6–5)
PROPOXYPH UR QL: NEGATIVE
PROT SERPL-MCNC: 7.7 GM/DL (ref 6.4–8.2)
PROT UR QL STRIP: NEGATIVE
RBC #/AREA URNS HPF: (no result) /HPF
SODIUM SERPL-SCNC: 140 MMOL/L (ref 135–145)
SP GR UR STRIP: 1.02 (ref 1.02–1.02)
SQUAMOUS #/AREA URNS HPF: (no result) /HPF
TRICYCLICS UR QL SCN: NEGATIVE
WBC # BLD AUTO: 7.3 10^3/UL (ref 4.3–11)
WBC #/AREA URNS HPF: (no result) /HPF

## 2021-05-28 PROCEDURE — 99284 EMERGENCY DEPT VISIT MOD MDM: CPT

## 2021-05-28 PROCEDURE — 82553 CREATINE MB FRACTION: CPT

## 2021-05-28 PROCEDURE — 80053 COMPREHEN METABOLIC PANEL: CPT

## 2021-05-28 PROCEDURE — 93005 ELECTROCARDIOGRAM TRACING: CPT

## 2021-05-28 PROCEDURE — 80306 DRUG TEST PRSMV INSTRMNT: CPT

## 2021-05-28 PROCEDURE — 71045 X-RAY EXAM CHEST 1 VIEW: CPT

## 2021-05-28 PROCEDURE — 80320 DRUG SCREEN QUANTALCOHOLS: CPT

## 2021-05-28 PROCEDURE — 81000 URINALYSIS NONAUTO W/SCOPE: CPT

## 2021-05-28 PROCEDURE — 84484 ASSAY OF TROPONIN QUANT: CPT

## 2021-05-28 PROCEDURE — 70450 CT HEAD/BRAIN W/O DYE: CPT

## 2021-05-28 PROCEDURE — 82150 ASSAY OF AMYLASE: CPT

## 2021-05-28 PROCEDURE — 82550 ASSAY OF CK (CPK): CPT

## 2021-05-28 PROCEDURE — 36415 COLL VENOUS BLD VENIPUNCTURE: CPT

## 2021-05-28 PROCEDURE — 85025 COMPLETE CBC W/AUTO DIFF WBC: CPT

## 2021-05-28 PROCEDURE — 72125 CT NECK SPINE W/O DYE: CPT

## 2021-05-28 PROCEDURE — 93041 RHYTHM ECG TRACING: CPT

## 2021-05-28 PROCEDURE — 83874 ASSAY OF MYOGLOBIN: CPT

## 2021-05-28 PROCEDURE — 80329 ANALGESICS NON-OPIOID 1 OR 2: CPT

## 2021-05-28 PROCEDURE — 83690 ASSAY OF LIPASE: CPT

## 2021-05-28 PROCEDURE — 83735 ASSAY OF MAGNESIUM: CPT

## 2021-05-28 PROCEDURE — 82947 ASSAY GLUCOSE BLOOD QUANT: CPT

## 2021-05-29 VITALS — DIASTOLIC BLOOD PRESSURE: 98 MMHG | SYSTOLIC BLOOD PRESSURE: 140 MMHG

## 2021-05-29 NOTE — DIAGNOSTIC IMAGING REPORT
INDICATION:  SYNCOPE.  



TECHNIQUE:  Single view chest 11:07 PM.



CORRELATION STUDY:  01/24/2021



FINDINGS: 

The heart size, mediastinal configuration and pulmonary

vascularity are within normal limits.  

The lungs are clear with no consolidating infiltrate. There is no

significant effusion or pneumothorax. 



IMPRESSION: 

1. Stable, negative appearing portable chest.



Dictated by: 



  Dictated on workstation # [id number]

## 2021-05-29 NOTE — DIAGNOSTIC IMAGING REPORT
PROCEDURE: CT head and CT cervical spine without contrast.



TECHNIQUE: Multiple contiguous axial images were obtained through

the brain and cervical spine without the use of intravenous

contrast. Sagittal and coronal reformations through the cervical

spine were then performed. Auto Exposure Controls were utilized

during the CT exam to meet ALARA standards for radiation dose

reduction. 



INDICATION:  Coughing at work, blacked out, hit head and back.

Vomiting.

12/20/2020

CORRELATION:  None



CT HEAD FINDINGS: 

The ventricles and sulci are within normal limits. There is no

midline shift or mass effect. No evidence for acute intracranial

hemorrhage or extra-axial fluid collections. The bony calvarium

is intact. There is rather extensive mucosal thickening

throughout the paranasal sinuses.



CT CERVICAL SPINE FINDINGS: 

There is normal alignment and curvature of the cervical spine.

There is no evidence for acute bony abnormality. The odontoid is

intact. The prevertebral soft tissues are normal.



IMPRESSION:

1.  No acute intracranial abnormality.

2.  No evidence for acute cervical spine fracture or subluxation.





Initial report was provided by [employer].





 



Dictated by: 



  Dictated on workstation # [id number]

## 2021-10-06 ENCOUNTER — HOSPITAL ENCOUNTER (EMERGENCY)
Dept: HOSPITAL 75 - ER | Age: 41
Discharge: HOME | End: 2021-10-06
Payer: MEDICARE

## 2021-10-06 VITALS — SYSTOLIC BLOOD PRESSURE: 129 MMHG | DIASTOLIC BLOOD PRESSURE: 84 MMHG

## 2021-10-06 VITALS — HEIGHT: 60 IN | BODY MASS INDEX: 47.31 KG/M2 | WEIGHT: 240.97 LBS

## 2021-10-06 DIAGNOSIS — R11.2: ICD-10-CM

## 2021-10-06 DIAGNOSIS — R19.7: ICD-10-CM

## 2021-10-06 DIAGNOSIS — R10.84: Primary | ICD-10-CM

## 2021-10-06 DIAGNOSIS — Z86.73: ICD-10-CM

## 2021-10-06 LAB
ALBUMIN SERPL-MCNC: 3.9 GM/DL (ref 3.2–4.5)
ALP SERPL-CCNC: 73 U/L (ref 40–136)
ALT SERPL-CCNC: 25 U/L (ref 0–55)
APTT PPP: YELLOW S
BACTERIA #/AREA URNS HPF: (no result) /HPF
BARBITURATES UR QL: NEGATIVE
BASOPHILS # BLD AUTO: 0.1 10^3/UL (ref 0–0.1)
BASOPHILS NFR BLD AUTO: 1 % (ref 0–10)
BENZODIAZ UR QL SCN: NEGATIVE
BILIRUB SERPL-MCNC: 0.5 MG/DL (ref 0.1–1)
BILIRUB UR QL STRIP: NEGATIVE
BUN/CREAT SERPL: 4
CALCIUM SERPL-MCNC: 9 MG/DL (ref 8.5–10.1)
CHLORIDE SERPL-SCNC: 106 MMOL/L (ref 98–107)
CO2 SERPL-SCNC: 26 MMOL/L (ref 21–32)
COCAINE UR QL: NEGATIVE
CREAT SERPL-MCNC: 0.95 MG/DL (ref 0.6–1.3)
EOSINOPHIL # BLD AUTO: 0.3 10^3/UL (ref 0–0.3)
EOSINOPHIL NFR BLD AUTO: 4 % (ref 0–10)
FIBRINOGEN PPP-MCNC: CLEAR MG/DL
GFR SERPLBLD BASED ON 1.73 SQ M-ARVRAT: 87 ML/MIN
GLUCOSE SERPL-MCNC: 103 MG/DL (ref 70–105)
GLUCOSE UR STRIP-MCNC: NEGATIVE MG/DL
HCT VFR BLD CALC: 47 % (ref 40–54)
HGB BLD-MCNC: 15.9 G/DL (ref 13.3–17.7)
KETONES UR QL STRIP: NEGATIVE
LEUKOCYTE ESTERASE UR QL STRIP: NEGATIVE
LIPASE SERPL-CCNC: 16 U/L (ref 8–78)
LYMPHOCYTES # BLD AUTO: 1.5 10^3/UL (ref 1–4)
LYMPHOCYTES NFR BLD AUTO: 20 % (ref 12–44)
MANUAL DIFFERENTIAL PERFORMED BLD QL: NO
MCH RBC QN AUTO: 33 PG (ref 25–34)
MCHC RBC AUTO-ENTMCNC: 34 G/DL (ref 32–36)
MCV RBC AUTO: 96 FL (ref 80–99)
METHADONE UR QL SCN: NEGATIVE
METHAMPHETAMINE SCREEN URINE S: NEGATIVE
MONOCYTES # BLD AUTO: 0.4 10^3/UL (ref 0–1)
MONOCYTES NFR BLD AUTO: 5 % (ref 0–12)
NEUTROPHILS # BLD AUTO: 5.1 10^3/UL (ref 1.8–7.8)
NEUTROPHILS NFR BLD AUTO: 70 % (ref 42–75)
NITRITE UR QL STRIP: NEGATIVE
OPIATES UR QL SCN: NEGATIVE
OXYCODONE UR QL: NEGATIVE
PH UR STRIP: 8 [PH] (ref 5–9)
PLATELET # BLD: 261 10^3/UL (ref 130–400)
PMV BLD AUTO: 8.3 FL (ref 9–12.2)
POTASSIUM SERPL-SCNC: 3.4 MMOL/L (ref 3.6–5)
PROPOXYPH UR QL: NEGATIVE
PROT SERPL-MCNC: 6.6 GM/DL (ref 6.4–8.2)
PROT UR QL STRIP: NEGATIVE
RBC #/AREA URNS HPF: (no result) /HPF
SODIUM SERPL-SCNC: 141 MMOL/L (ref 135–145)
SP GR UR STRIP: 1.01 (ref 1.02–1.02)
SQUAMOUS #/AREA URNS HPF: (no result) /HPF
TRICYCLICS UR QL SCN: NEGATIVE
WBC # BLD AUTO: 7.4 10^3/UL (ref 4.3–11)
WBC #/AREA URNS HPF: (no result) /HPF

## 2021-10-06 PROCEDURE — 81000 URINALYSIS NONAUTO W/SCOPE: CPT

## 2021-10-06 PROCEDURE — 99284 EMERGENCY DEPT VISIT MOD MDM: CPT

## 2021-10-06 PROCEDURE — 85652 RBC SED RATE AUTOMATED: CPT

## 2021-10-06 PROCEDURE — 80320 DRUG SCREEN QUANTALCOHOLS: CPT

## 2021-10-06 PROCEDURE — 80306 DRUG TEST PRSMV INSTRMNT: CPT

## 2021-10-06 PROCEDURE — 86141 C-REACTIVE PROTEIN HS: CPT

## 2021-10-06 PROCEDURE — 74177 CT ABD & PELVIS W/CONTRAST: CPT

## 2021-10-06 PROCEDURE — 83690 ASSAY OF LIPASE: CPT

## 2021-10-06 PROCEDURE — 80053 COMPREHEN METABOLIC PANEL: CPT

## 2021-10-06 PROCEDURE — 85025 COMPLETE CBC W/AUTO DIFF WBC: CPT

## 2021-10-06 PROCEDURE — 36415 COLL VENOUS BLD VENIPUNCTURE: CPT

## 2021-10-06 PROCEDURE — 74019 RADEX ABDOMEN 2 VIEWS: CPT

## 2021-10-06 NOTE — DIAGNOSTIC IMAGING REPORT
PROCEDURE: CT abdomen and pelvis with contrast.



TECHNIQUE: Multiple contiguous axial images were obtained through

the abdomen and pelvis after administration of intravenous

contrast. Auto Exposure Controls were utilized during the CT exam

to meet ALARA standards for radiation dose reduction. All CT

scans use one or more of the following dose optimizing

techniques: automated exposure control, MA and/or KvP adjustment

based on patient size and exam type or iterative reconstruction.



INDICATION: Periumbilical pain, symptoms 3 days duration. 



COMPARISON: CT abdomen and pelvis 05/25/2021.



FINDINGS: The appendix arises off the posterior aspect of the

caudal pole of the cecum and is distally directed medially. Its

nondilated air-containing and no periappendiceal edema or

periappendiceal inflammation. There is no appendicitis. There is

no diverticulitis. There is no small or large bowel obstruction.

No pneumatosis or free gas. There is no hydroureteronephrosis.

The aortoiliac and mesenteric vessels patent and nonaneurysmal. 

There is a chronic periumbilical fatty hernia, stable from prior.

No herniation of viscus or inflammatory changes within the stable

small hernia sac. Liver, gallbladder, bile ducts, spleen,

adrenals and pancreas are all nonacute. Urinary bladder

unremarkable. The prostate and seminal vesicles unremarkable.

Lung bases and bony structures nonacute.



IMPRESSION: Unobstructed urinary tracts, stable. Normal appendix.

Stable fatty periumbilical hernia with no acute abdominal wall

pathology, inflammatory change or obstructive feature.



Dictated by: 



  Dictated on workstation # [id number]

## 2021-10-06 NOTE — ED ABDOMINAL PAIN
General


Chief Complaint:  Abdominal/GI Problems


Stated Complaint:  ABD PAIN


Nursing Triage Note:  


Pt arrives via POV from home with c/o abdominal pain around his naval, tender to




palpation; onset last night. Pt reports last night he had N/V x2, took tylenol 


without relief. Pt states today he took ibuprofen without relief, had a two hour




period of multiple loose stools. Pt unable to eat/drink d/t nausea. Pt reports 


negative covid test two days ago for work.


Source of Information:  Patient


Exam Limitations:  No Limitations


 (JESSICA MCGREGOR MD)





History of Present Illness


Date Seen by Provider:  Oct 6, 2021


Time Seen by Provider:  16:35


Initial Comments


Patient is a 41-year-old male who presents to the emergency room with a chief 

complaint of diffuse abdominal pain, most prominent around the periumbilical 

region onset about 10:00 last night.  Patient complains of nausea with a couple 

of episodes of vomiting also diarrhea.  Patient states he never looks at his 

bowel movement so he does not know if he has had black or bloody stools.  

"Dribbling" urine today.  Has not had any oral intake whatsoever today.  Patient

states his last oral intake was a bowl of soup at 11:00 yesterday a.m.  Patient 

tells me that he has never had symptoms this severe before.  Review of the 

medical record shows the patient had a couple of ER visits for similar 

complaints in May of this year.





Sick contact, son at home with similar symptoms but also with respiratory 

symptoms.  Both were tested in the last 48 hours and were negative.





Patient does not drink alcohol on a daily basis, denies illicit drug use and 

smoking.





No prior abdominal surgeries.  He states that he has an umbilical hernia.  He is

concerned that this is the source of his symptoms.  He has not taken anything 

for his pain or nausea.





All other review of systems reviewed and negative except as stated


Timing/Duration:  24 Hours


Severity/Quality:  Moderate


Location:  RLQ, Periumbilical


Radiation:  Groin (right)


Activities at Onset:  None


Associated Symptoms:  Nausea/Vomiting (JESSICA MCGREGOR MD)





Allergies and Home Medications


Allergies


Coded Allergies:  


     Sulfa (Sulfonamide Antibiotics) (Verified  Allergy, Severe, HIVES, 20)


     levofloxacin (Verified  Allergy, Unknown, 20)


     doxycycline (Verified  Adverse Reaction, Mild, Vomiting, 20)





Patient Home Medication List


Home Medication List Reviewed:  Yes


 (JESSICA MCGREGOR MD)


Acetaminophen (Tylenol Extra Strength) 500 Mg Tablet, 1,000 MG PO Q8H PRN for 

PAIN-MILD (1-4), (Reported)


   Entered as Reported by: CHRIS ALSTON on 12/15/20 0928


Benzonatate (Tessalon Perles) 100 Mg Capsule, 200 MG PO TID


   Prescribed by: RIDDHI VAUGHN on 21


Guaifenesin/Dextromethorphan (Mucinex Dm ER 1,200-60 mg Tab) 1 Each Tbmp.12hr, 1

EACH PO BID


   Prescribed by: RIDDHI VAUGHN on 21


Hydrocodone/Acetaminophen (Hydrocodone-Acetamin 5-325 mg) 1 Each Tablet, 1 TAB 

PO Q4H PRN for PAIN-MODERATE (5-7)


   Prescribed by: SOHAM JAMISON on 10/6/21 2035


Metoprolol Succinate (Metoprolol Succinate) 50 Mg Tab.er.24h, 50 MG PO, 

(Reported)


   Entered as Reported by: EARNEST BARKLEY on 21


Nitroglycerin (Nitroglycerin) 0.4 Mg Tab.subl, 0.4 MG SL, (Reported)


   Entered as Reported by: EARNEST BARKLEY on 21


Olanzapine (Olanzapine) 5 Mg Tablet, 5 MG PO, (Reported)


   Entered as Reported by: EARNEST BARKLEY on 21


Omeprazole (Omeprazole) 20 Mg Capsule.dr, 20 MG PO BID


   Prescribed by: SOHAM JAMISON on 20 1427


Ondansetron (Ondansetron Odt) 4 Mg Tab.rapdis, 4 MG PO Q8H PRN for nausea


   Prescribed by: JESSICA MCGREGOR on 21 1215


Ondansetron (Ondansetron Odt) 8 Mg Tab.rapdis, 8 MG PO Q4H PRN for 

NAUSEA/VOMITING


   Prescribed by: RIDDHI VAUGHN on 21


Ondansetron (Ondansetron Odt) 4 Mg Tab.rapdis, 4 MG PO Q4H PRN for BEBETO

SEA/VOMITING


   Prescribed by: SOHAM JAMISON on 10/6/21 2035


Pantoprazole Sodium (Protonix) 40 Mg Tablet.dr, 40 MG PO DAILY


   Prescribed by: RIDDHI VAUGHN on 21 2350





Review of Systems


Review of Systems


Constitutional:  see HPI


EENTM:  No Symptoms Reported


Respiratory:  No Symptoms Reported


Cardiovascular:  No Symptoms Reported


Gastrointestinal:  Abdominal Pain, Diarrhea, Nausea, Vomiting


Genitourinary:  Other ("dribbling")


Musculoskeletal:  no symptoms reported


Skin:  no symptoms reported


Psychiatric/Neurological:  Anxiety (JESSICA MCGREGOR MD)





All Other Systems Reviewed


Negative Unless Noted:  Yes


 (JESSICA MCGREGOR MD)





Past Medical-Social-Family Hx


Patient Social History


Use of E-Cig and/or Vaping dev:  No


Substance use?:  No


Alcohol Use?:  No


Pt feels they are or have been:  No


 (JESSICA MCGREGOR MD)





Immunizations Up To Date


First/Initial COVID19 Vaccinat:  2021


Second COVID19 Vaccination Bo:  2021


COVID19 Vaccine :  Pfizer


 (JESSICA MCGREGOR MD)





Past Medical History


Surgeries:  Yes (KIDNEY STONES--BASKET REMOVAL, )


Adenoidectomy, Cardiac, Renal, Tonsillectomy, Vasectomy


Respiratory:  No


Cardiac:  No


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


Stroke


Reproductive Disorders:  No


Genitourinary:  Yes (EPIDIDYMITIS)


Kidney Stones


Gastrointestinal:  No


Musculoskeletal:  Yes


Chronic Back Pain


Endocrine:  Yes (hypoglycemia)


HEENT:  Yes (TONSILLECTOMY)


Tonsilitis


Cancer:  No


Psychosocial:  Yes


Anxiety, PTSD


Integumentary:  No


Blood Disorders:  No


 (JESSICA MCGREGOR MD)





Family Medical History





SOCIAL HISTORY:


-ETOH--OCCASIONAL USE


-DRUGS--DENIES USE


-SMOKES 1 PPD


 (JESSICA MCGREGOR MD)





Physical Exam


Vital Signs





Vital Signs - First Documented








 10/6/21





 16:30


 


Temp 37.1


 


Pulse 79


 


Resp 18


 


B/P (MAP) 134/97 (109)


 


Pulse Ox 98


 


O2 Delivery Room Air





 (SOHAM OCAMPO MD)


Vital Signs


Capillary Refill : Less Than 3 Seconds 


 (JESSICA MCGREGOR MD)


Height/Weight/BMI


Height: 6'"


Weight: 242lbs. oz. 109.512763bz; 210.00 BMI


Method:Stated


General Appearance:  WD/WN, mild distress


HEENT:  PERRL/EOMI


Respiratory:  lungs clear, normal breath sounds, no respiratory distress, no 

accessory muscle use


Cardiovascular:  regular rate, rhythm


Gastrointestinal:  normal bowel sounds, soft, guarding, rebound, tenderness, 

other (palpable umbilical hernia that I cannot reduce.  it is soft and nt 

discolored.  patient has voluntary guarding with palpation of the entire 

abdomen, most pronounced in the RLQ)


Extremities:  normal range of motion, non-tender, normal inspection, no pedal 

edema


Neurologic/Psychiatric:  no motor/sensory deficits, alert, normal mood/affect, 

oriented x 3


Skin:  normal color, warm/dry (JESSICA MCGREGOR MD)





Progress/Results/Core Measures


Results/Orders


Lab Results





Laboratory Tests








Test


 10/6/21


17:03 10/6/21


17:10 10/6/21


18:30 10/6/21


19:37 Range/Units


 


 


Urine Color YELLOW      


 


Urine Clarity CLEAR      


 


Urine pH 8.0     5-9  


 


Urine Specific Gravity 1.015 L    1.016-1.022  


 


Urine Protein NEGATIVE     NEGATIVE  


 


Urine Glucose (UA) NEGATIVE     NEGATIVE  


 


Urine Ketones NEGATIVE     NEGATIVE  


 


Urine Nitrite NEGATIVE     NEGATIVE  


 


Urine Bilirubin NEGATIVE     NEGATIVE  


 


Urine Urobilinogen 0.2     < = 1.0  MG/DL


 


Urine Leukocyte Esterase NEGATIVE     NEGATIVE  


 


Urine RBC (Auto) 1+ H    NEGATIVE  


 


Urine RBC 2-5 H     /HPF


 


Urine WBC NONE      /HPF


 


Urine Squamous Epithelial


Cells NONE 


 


 


 


  /HPF





 


Urine Crystals NONE      /LPF


 


Urine Bacteria TRACE      /HPF


 


Urine Casts NONE      /LPF


 


Urine Mucus NEGATIVE      /LPF


 


Urine Culture Indicated NO      


 


Urine Opiates Screen NEGATIVE     NEGATIVE  


 


Urine Oxycodone Screen NEGATIVE     NEGATIVE  


 


Urine Methadone Screen NEGATIVE     NEGATIVE  


 


Urine Propoxyphene Screen NEGATIVE     NEGATIVE  


 


Urine Barbiturates Screen NEGATIVE     NEGATIVE  


 


Ur Tricyclic Antidepressants


Screen NEGATIVE 


 


 


 


 NEGATIVE  





 


Urine Phencyclidine Screen NEGATIVE     NEGATIVE  


 


Urine Amphetamines Screen NEGATIVE     NEGATIVE  


 


Urine Methamphetamines Screen NEGATIVE     NEGATIVE  


 


Urine Benzodiazepines Screen NEGATIVE     NEGATIVE  


 


Urine Cocaine Screen NEGATIVE     NEGATIVE  


 


Urine Cannabinoids Screen NEGATIVE     NEGATIVE  


 


White Blood Count


 


 7.4 


 


 


 4.3-11.0


10^3/uL


 


Red Blood Count


 


 4.87 


 


 


 4.30-5.52


10^6/uL


 


Hemoglobin  15.9    13.3-17.7  g/dL


 


Hematocrit  47    40-54  %


 


Mean Corpuscular Volume  96    80-99  fL


 


Mean Corpuscular Hemoglobin  33    25-34  pg


 


Mean Corpuscular Hemoglobin


Concent 


 34 


 


 


 32-36  g/dL





 


Red Cell Distribution Width  13.0    10.0-14.5  %


 


Platelet Count


 


 261 


 


 


 130-400


10^3/uL


 


Mean Platelet Volume  8.3 L   9.0-12.2  fL


 


Immature Granulocyte % (Auto)  0     %


 


Neutrophils (%) (Auto)  70    42-75  %


 


Lymphocytes (%) (Auto)  20    12-44  %


 


Monocytes (%) (Auto)  5    0-12  %


 


Eosinophils (%) (Auto)  4    0-10  %


 


Basophils (%) (Auto)  1    0-10  %


 


Neutrophils # (Auto)


 


 5.1 


 


 


 1.8-7.8


10^3/uL


 


Lymphocytes # (Auto)


 


 1.5 


 


 


 1.0-4.0


10^3/uL


 


Monocytes # (Auto)


 


 0.4 


 


 


 0.0-1.0


10^3/uL


 


Eosinophils # (Auto)


 


 0.3 


 


 


 0.0-0.3


10^3/uL


 


Basophils # (Auto)


 


 0.1 


 


 


 0.0-0.1


10^3/uL


 


Immature Granulocyte # (Auto)


 


 0.0 


 


 


 0.0-0.1


10^3/uL


 


Sodium Level  141    135-145  MMOL/L


 


Potassium Level  3.4 L   3.6-5.0  MMOL/L


 


Chloride Level  106      MMOL/L


 


Carbon Dioxide Level  26    21-32  MMOL/L


 


Anion Gap  9    5-14  MMOL/L


 


Blood Urea Nitrogen  4 L   7-18  MG/DL


 


Creatinine


 


 0.95 


 


 


 0.60-1.30


MG/DL


 


Estimat Glomerular Filtration


Rate 


 87 


 


 


  





 


BUN/Creatinine Ratio  4     


 


Glucose Level  103      MG/DL


 


Calcium Level  9.0    8.5-10.1  MG/DL


 


Corrected Calcium  9.1    8.5-10.1  MG/DL


 


Total Bilirubin  0.5    0.1-1.0  MG/DL


 


Aspartate Amino Transf


(AST/SGOT) 


 18 


 


 


 5-34  U/L





 


Alanine Aminotransferase


(ALT/SGPT) 


 25 


 


 


 0-55  U/L





 


Alkaline Phosphatase  73      U/L


 


Total Protein  6.6    6.4-8.2  GM/DL


 


Albumin  3.9    3.2-4.5  GM/DL


 


Lipase  16    8-78  U/L


 


Serum Alcohol   < 10   <10  MG/DL


 


Erythrocyte Sedimentation Rate    3  0-15  MM/HR


 


C-Reactive Protein High


Sensitivity 


 


 


 0.59 H


 0.00-0.50


MG/DL





 (SOHAM OCAMPO MD)


My Orders





Orders - SOHAM OCAMPO MD


Ondansetron Injection (Zofran Injectio (10/6/21 18:30)


Morphine  Injection (Morphine  Injection (10/6/21 18:19)


Abdomen, Flat & Upright/Decub (10/6/21 18:19)


Alcohol (10/6/21 18:20)


Drug Screen Stat (Urine) (10/6/21 18:20)


Hs C Reactive Protein (10/6/21 19:34)


Erythrocyte Sedimentation Rate (10/6/21 19:34)


Morphine Pf Inj (Duramorph Pf Inj) (10/6/21 19:45)


Hyoscyamine Sl Tablet (Levsin Sl Tablet) (10/6/21 19:45)


Ct Abdomen/Pelvis W (10/6/21 19:34)


Iohexol Injection (Omnipaque 350 Mg/Ml 1 (10/6/21 19:45)


Received Contrast (Hold Metformin- Contr (10/6/21 19:45)


Ns (Ivpb) (Sodium Chloride 0.9% Ivpb Bag (10/6/21 19:45)


Morphine  Injection (Morphine  Injection (10/6/21 19:56)


Rx-Hydrocodone/Apap 5-325 Mg (Rx-Vicodin (10/6/21 20:30)


Rx-Ondansetron Po (Rx-Zofran Po) (10/6/21 20:30)


 (SOHAM OCAMPO MD)


Medications Given in ED





Current Medications








 Medications  Dose


 Ordered  Sig/Duy


 Route  Start Time


 Stop Time Status Last Admin


Dose Admin


 


 Hyoscyamine


 Sulfate  0.25 mg  ONCE  ONCE


 SL  10/6/21 19:45


 10/6/21 19:46 DC 10/6/21 19:40


0.25 MG


 


 Iohexol  100 ml  ONCE  ONCE


 IV  10/6/21 19:45


 10/6/21 19:46 DC 10/6/21 19:48


100 ML


 


 Ketorolac


 Tromethamine  15 mg  ONCE  ONCE


 IVP  10/6/21 16:45


 10/6/21 16:46 DC 10/6/21 17:13


15 MG


 


 Morphine Sulfate  6 mg  ONCE  ONCE


 IV  10/6/21 19:45


 10/6/21 19:46 DC 10/6/21 20:01


6 MG


 


 Ondansetron HCl  4 mg  ONCE  ONCE


 IVP  10/6/21 16:45


 10/6/21 16:46 DC 10/6/21 17:13


4 MG


 


 Ondansetron HCl  4 mg  ONCE  ONCE


 IVP  10/6/21 18:30


 10/6/21 18:31 DC 10/6/21 18:32


4 MG


 


 Sodium Chloride  100 ml  ONCE  ONCE


 IV  10/6/21 19:45


 10/6/21 19:46 DC 10/6/21 19:48


80 ML





 (SOHAM OCAMPO MD)


Vital Signs/I&O











 10/6/21





 16:30


 


Temp 37.1


 


Pulse 79


 


Resp 18


 


B/P (MAP) 134/97 (109)


 


Pulse Ox 98


 


O2 Delivery Room Air





 (SOHAM OCAMPO MD)








Blood Pressure Mean:                    109











Progress


Progress Note :  


   Time:  17:40


Progress Note


Care passed to Dr. Garcia at shift change with studies pending.


 (JESSICA MCGREGOR MD)


Progress Note :  


   Time:  20:41


Progress Note


Assumed care of this patient from Dr. Mcgregor at shift change.  Patient was 

reassessed and found to have significant tenderness to palpation and percussion 

throughout the abdomen, most notably in the periumbilical and left lower 

quadrant regions.  Labs were reviewed and unremarkable.  Patient had persistent 

and escalating pain despite treatment.  He was redosed with morphine x2 and 

given Levsin.  Pain did not resolve.  I discussed risks and benefits of imaging 

with him.  I have reviewed his chart and noted multiple CT scans within the past

year, none showing any significant pathology.  I have discussed the option of 

repeat imaging with him.  Patient was pushing for another CT scan as he is very 

concerned about what might be causing his intense pain.  A KUB and upright x-ray

was obtained initially as an alternative and was unremarkable.  Symptoms were 

persistent despite treatment.  I discussed the risks and benefits twice with him

and he still was pushing for CT evaluation.  We did pursue CT imaging with his 

understanding of risks and benefits.  I explained risks to include radiation 

exposure that increases risk for cancer, risk of allergy and renal impairment 

with contrast administration, and cost.  CT did not show any acute surgical 

pathology or any pathology requiring urgent therapy.  He was discharged with 

take-home medications for Zofran and hydrocodone.  He was strongly encouraged to

pursue endoscopy and surgical consultation on an outpatient basis.


 (SOHAM OCAMPO MD)





Diagnostic Imaging





   Diagonstic Imaging:  Xray


   Plain Films/CT/US/NM/MRI:  abdomen, pelvis


Comments


NAME:   TABATHA HAYNES 


MED REC#:   [id number]


ACCOUNT#:   [id number]


PT STATUS:   REG ER


:   1980


PHYSICIAN:   SOHAM OCAMPO MD


ADMIT DATE:   10/06/21/ER


***Signed***


Date of Exam:10/06/21





ABDOMEN, FLAT & UPRIGHT/DECUB








EXAMINATION: Abdomen 2 view.





HISTORY: Abd pain.





COMPARISON: None available.





FINDINGS: Bowel gas pattern is normal. No dilated bowel or free


air.





IMPRESSION: No dilated bowel.





Dictated by: 





  Dictated on workstation # [id number]








Dict:   10/06/21 1900


Trans:   10/06/21 1939


[facility] [id number]





Interpreted by:     LIZA OCHOA MD


Electronically signed by: LIZA OCHOA MD 10/06/21 1939








   Diagonstic Imaging:  CT


   Plain Films/CT/US/NM/MRI:  abdomen, pelvis


Comments


NAME:   TABATHA HAYNES [facility] REC#:   [id number]


ACCOUNT#:   [id number]


PT STATUS:   REG ER


:   1980


PHYSICIAN:   SOHAM OCAMPO MD


ADMIT DATE:   10/06/21/ER


***Signed***


Date of Exam:10/06/21





CT ABDOMEN/PELVIS W








PROCEDURE: CT abdomen and pelvis with contrast.





TECHNIQUE: Multiple contiguous axial images were obtained through


the abdomen and pelvis after administration of intravenous


contrast. Auto Exposure Controls were utilized during the CT exam


to meet ALARA standards for radiation dose reduction. All CT


scans use one or more of the following dose optimizing


techniques: automated exposure control, MA and/or KvP adjustment


based on patient size and exam type or iterative reconstruction.





INDICATION: Periumbilical pain, symptoms 3 days duration. 





COMPARISON: CT abdomen and pelvis 2021.





FINDINGS: The appendix arises off the posterior aspect of the


caudal pole of the cecum and is distally directed medially. Its


nondilated air-containing and no periappendiceal edema or


periappendiceal inflammation. There is no appendicitis. There is


no diverticulitis. There is no small or large bowel obstruction.


No pneumatosis or free gas. There is no hydroureteronephrosis.


The aortoiliac and mesenteric vessels patent and nonaneurysmal. 


There is a chronic periumbilical fatty hernia, stable from prior.


No herniation of viscus or inflammatory changes within the stable


small hernia sac. Liver, gallbladder, bile ducts, spleen,


adrenals and pancreas are all nonacute. Urinary bladder


unremarkable. The prostate and seminal vesicles unremarkable.


Lung bases and bony structures nonacute.





IMPRESSION: Unobstructed urinary tracts, stable. Normal appendix.


Stable fatty periumbilical hernia with no acute abdominal wall


pathology, inflammatory change or obstructive feature.





Dictated by: 





  Dictated on workstation # [id number]








Dict:   10/06/21 1948


Trans:   10/06/21 2039


[facility] [id number]





Interpreted by:     JOSE E HILL


Electronically signed by: JOSE E HILL 10/06/21 2039


 (SOHAM OCAMPO MD)





Departure


Impression





   Primary Impression:  


   Generalized abdominal pain


   Additional Impression:  


   Nausea vomiting and diarrhea


Disposition:  01 HOME, SELF-CARE


Condition:  Improved





Departure-Patient Inst.


Decision time for Depature:  20:32


 (SOHAM OCAMPO MD)


Referrals:  


[facility] [place] (PCP)


Primary Care Physician








HENOK JULIAN (Family)


Primary Care Physician


Patient Instructions:  Severe Abdominal Pain, Adult (DC)





Add. Discharge Instructions:  


Adhere to only a noncarbonated clear liquid diet tonight.  If you are feeling 

well in the morning you may gradually advance your diet with small quantities of

bland food as tolerated.  Avoid fatty or greasy foods or dairy products for at 

least 48 hours.


Call your doctors office in the morning and schedule a follow-up appointment as 

soon as possible.  You have had numerous visits to the emergency room with 

numerous CT scans.  I am strongly recommending that you have a referral to a 

surgeon and pursue colonoscopy and EGD to help investigate the cause of your 

abdominal pain.


For mild pain you may take Tylenol (acetaminophen) up to 1000 mg every 6 hours 

as needed.  For more severe pain take hydrocodone as prescribed.


Take Zofran (ondansetron) as prescribed for nausea and vomiting.


Call with questions or concerns.


Return to the ER if you have worsening symptoms.





All discharge instructions reviewed with patient and/or family. Voiced 

understanding.


Scripts


Hydrocodone/Acetaminophen (Hydrocodone-Acetamin 5-325 mg) 1 Each Tablet


1 TAB PO Q4H PRN for PAIN-MODERATE (5-7), #8 TAB


   Prov: SOHAM OCAMPO MD         10/6/21 


Ondansetron (Ondansetron Odt) 4 Mg Tab.rapdis


4 MG PO Q4H PRN for NAUSEA/VOMITING, #10 TAB


   Prov: SOHAM OCAMPO MD         10/6/21





Copy


Copies To 1:   YEE PATEL KATHRYN M MD          Oct 6, 2021 16:50


SOHAM OCAMPO MD         Oct 6, 2021 20:36

## 2021-10-06 NOTE — DIAGNOSTIC IMAGING REPORT
EXAMINATION: Abdomen 2 view.



HISTORY: Abd pain.



COMPARISON: None available.



FINDINGS: Bowel gas pattern is normal. No dilated bowel or free

air.



IMPRESSION: No dilated bowel.



Dictated by: 



  Dictated on workstation # [email or web address]

## 2021-10-12 ENCOUNTER — HOSPITAL ENCOUNTER (EMERGENCY)
Dept: HOSPITAL 75 - ER | Age: 41
Discharge: HOME | End: 2021-10-12
Payer: MEDICAID

## 2021-10-12 VITALS — HEIGHT: 71.65 IN | BODY MASS INDEX: 31.7 KG/M2 | WEIGHT: 231.49 LBS

## 2021-10-12 VITALS — DIASTOLIC BLOOD PRESSURE: 84 MMHG | SYSTOLIC BLOOD PRESSURE: 125 MMHG

## 2021-10-12 DIAGNOSIS — Z86.73: ICD-10-CM

## 2021-10-12 DIAGNOSIS — F41.9: ICD-10-CM

## 2021-10-12 DIAGNOSIS — Z79.899: ICD-10-CM

## 2021-10-12 DIAGNOSIS — R11.2: Primary | ICD-10-CM

## 2021-10-12 DIAGNOSIS — F43.10: ICD-10-CM

## 2021-10-12 DIAGNOSIS — R19.7: ICD-10-CM

## 2021-10-12 LAB
ALBUMIN SERPL-MCNC: 3.9 GM/DL (ref 3.2–4.5)
ALP SERPL-CCNC: 76 U/L (ref 40–136)
ALT SERPL-CCNC: 24 U/L (ref 0–55)
AMORPH SED URNS QL MICRO: (no result) /LPF
AMYLASE SERPL-CCNC: 40 U/L (ref 25–125)
APTT PPP: YELLOW S
BACTERIA #/AREA URNS HPF: (no result) /HPF
BASOPHILS # BLD AUTO: 0.1 10^3/UL (ref 0–0.1)
BASOPHILS NFR BLD AUTO: 1 % (ref 0–10)
BILIRUB SERPL-MCNC: 0.6 MG/DL (ref 0.1–1)
BILIRUB UR QL STRIP: NEGATIVE
BUN/CREAT SERPL: 7
CALCIUM SERPL-MCNC: 9 MG/DL (ref 8.5–10.1)
CHLORIDE SERPL-SCNC: 106 MMOL/L (ref 98–107)
CO2 SERPL-SCNC: 20 MMOL/L (ref 21–32)
CREAT SERPL-MCNC: 0.91 MG/DL (ref 0.6–1.3)
EOSINOPHIL # BLD AUTO: 0.3 10^3/UL (ref 0–0.3)
EOSINOPHIL NFR BLD AUTO: 5 % (ref 0–10)
FIBRINOGEN PPP-MCNC: CLEAR MG/DL
GFR SERPLBLD BASED ON 1.73 SQ M-ARVRAT: 91 ML/MIN
GLUCOSE SERPL-MCNC: 95 MG/DL (ref 70–105)
GLUCOSE UR STRIP-MCNC: NEGATIVE MG/DL
HCT VFR BLD CALC: 47 % (ref 40–54)
HGB BLD-MCNC: 15.9 G/DL (ref 13.3–17.7)
KETONES UR QL STRIP: NEGATIVE
LEUKOCYTE ESTERASE UR QL STRIP: NEGATIVE
LIPASE SERPL-CCNC: 16 U/L (ref 8–78)
LYMPHOCYTES # BLD AUTO: 1.6 10^3/UL (ref 1–4)
LYMPHOCYTES NFR BLD AUTO: 24 % (ref 12–44)
MAGNESIUM SERPL-MCNC: 2.2 MG/DL (ref 1.6–2.4)
MANUAL DIFFERENTIAL PERFORMED BLD QL: NO
MCH RBC QN AUTO: 33 PG (ref 25–34)
MCHC RBC AUTO-ENTMCNC: 34 G/DL (ref 32–36)
MCV RBC AUTO: 97 FL (ref 80–99)
MONOCYTES # BLD AUTO: 0.5 10^3/UL (ref 0–1)
MONOCYTES NFR BLD AUTO: 8 % (ref 0–12)
NEUTROPHILS # BLD AUTO: 3.9 10^3/UL (ref 1.8–7.8)
NEUTROPHILS NFR BLD AUTO: 61 % (ref 42–75)
NITRITE UR QL STRIP: NEGATIVE
PH UR STRIP: 7 [PH] (ref 5–9)
PLATELET # BLD: 228 10^3/UL (ref 130–400)
PMV BLD AUTO: 9.2 FL (ref 9–12.2)
POTASSIUM SERPL-SCNC: 4.2 MMOL/L (ref 3.6–5)
PROT SERPL-MCNC: 6.8 GM/DL (ref 6.4–8.2)
PROT UR QL STRIP: NEGATIVE
RBC #/AREA URNS HPF: (no result) /HPF
SODIUM SERPL-SCNC: 138 MMOL/L (ref 135–145)
SP GR UR STRIP: 1.01 (ref 1.02–1.02)
SQUAMOUS #/AREA URNS HPF: (no result) /HPF
WBC # BLD AUTO: 6.4 10^3/UL (ref 4.3–11)
WBC #/AREA URNS HPF: (no result) /HPF

## 2021-10-12 PROCEDURE — 83690 ASSAY OF LIPASE: CPT

## 2021-10-12 PROCEDURE — 82274 ASSAY TEST FOR BLOOD FECAL: CPT

## 2021-10-12 PROCEDURE — 87088 URINE BACTERIA CULTURE: CPT

## 2021-10-12 PROCEDURE — 81000 URINALYSIS NONAUTO W/SCOPE: CPT

## 2021-10-12 PROCEDURE — 82150 ASSAY OF AMYLASE: CPT

## 2021-10-12 PROCEDURE — 36415 COLL VENOUS BLD VENIPUNCTURE: CPT

## 2021-10-12 PROCEDURE — 83735 ASSAY OF MAGNESIUM: CPT

## 2021-10-12 PROCEDURE — 99284 EMERGENCY DEPT VISIT MOD MDM: CPT

## 2021-10-12 PROCEDURE — 85025 COMPLETE CBC W/AUTO DIFF WBC: CPT

## 2021-10-12 PROCEDURE — 80320 DRUG SCREEN QUANTALCOHOLS: CPT

## 2021-10-12 PROCEDURE — 93041 RHYTHM ECG TRACING: CPT

## 2021-10-12 PROCEDURE — 80053 COMPREHEN METABOLIC PANEL: CPT

## 2021-10-12 NOTE — ED GI
General


Stated Complaint:  ABD PAIN


Source of Information:  Patient, EMS, Old Records





History of Present Illness


Date Seen by Provider:  Oct 12, 2021


Time Seen by Provider:  17:50


Initial Comments


PT ARRIVES VIA EMS FROM HOME


C/O NAUSEA/VOMITING/DIARRHEA/ABDOMINAL PAIN


THIS IS CHRONIC/FREQUENT COMPLAINT FOR PT, PT WAS HERE 7 DAYS AGO FOR SAME, WORK

UP, INCLUDING LAB AND CT OF ABDOMEN/PELVIS ALL NORMAL AT THAT TIME


PT HAS HAD MULTIPLE  CT SCANS OF HEAD/CHEST/ABDOMEN/PELVIS IN THE LAST YEAR AND 

ALL HAVE BEEN NORMAL ( OTHER THAN KNOWN NON-OBSTRUCTING INTRARENAL STONES) 


SEEN AT [facility] TODAY FOR FOLLOW UP, PT STATES THEY ARE GOING TO SET HIM UP TO 

HAVE AN EGD AND COLONOSCOPY, BUT THIS HAS NOT BEEN DONE YET. PT ALSO STATES HE 

WAS PRESCRIBED AN UNKNOWN MEDICATION TODAY, BUT DID NOT PICK IT UP--WAS SENT TO 

[facility] IN [place] AT PT REQUEST





PT C/O DIFFUSE LOWER ABDOMINAL PAIN 


CLAIMS PAIN IS A "10"


PT IS NOT NAUSEATED NOW, AND HAS NOT TAKEN ANYTHING FOR NAUSEA TODAY


HAS VOMITED X 5 TODAY--NORMAL YELLOW/GREEN BILE. NO FOOD


STATES HE HAS NOT EATEN IN OVER 2 DAYS, AND HAD A SIP OF WATER YESTERDAY 

MORNING, OTHERWISE NO FLUID INTAKE EITHER


HAS HAD DIARRHEA TODAY X 6-7 --STATES STOOLS ARE BLACK AND TARRY


C/O DECREASED URINE OUTPUT, LAST VOID WAS A COUPLE OF HOURS AGO, AND NEEDS TO 

URINATE ON ARRIVAL


NO KNOWN FEVER, BUT HAS HAD "COLD CHILLS" 


ALSO C/O HEADACHE





HAS NOT TAKEN ANYTHING FOR SYMPTOMS AT ANY TIME


STATES HE DOES NOT HAVE ANY GI MEDICATIONS AT HOME





PT HAS HAD PFIZER COVID-19 VACCINE X 2--LAST ONE 04/29/21


NO KNOWN SICK CONTACTS





PT WITH MULTITUDE OF VISITS, MANY FOR GI COMPLAINTS AND/OR CHEST PAIN 

COMPLAINTS--ALL WORK UP'S HAVE BEEN NON-DIAGNOSTIC





PCP: [facility], NP ELIEL





Allergies and Home Medications


Allergies


Coded Allergies:  


     Sulfa (Sulfonamide Antibiotics) (Verified  Allergy, Severe, HIVES, 11/4/20)


     levofloxacin (Verified  Allergy, Unknown, 9/2/20)


     doxycycline (Verified  Adverse Reaction, Mild, Vomiting, 9/2/20)





Patient Home Medication List


Home Medication List Reviewed:  Yes


Acetaminophen (Tylenol Extra Strength) 500 Mg Tablet, 1,000 MG PO Q8H PRN for 

PAIN-MILD (1-4), (Reported)


   Entered as Reported by: CHRIS ALSTON on 12/15/20 0928


Benzonatate (Tessalon Perles) 100 Mg Capsule, 200 MG PO TID


   Prescribed by: RIDDHI VAUGHN on 5/28/21 2350


Guaifenesin/Dextromethorphan (Mucinex Dm ER 1,200-60 mg Tab) 1 Each Tbmp.12hr, 1

EACH PO BID


   Prescribed by: RIDDHI VAUGHN on 5/28/21 2350


Hydrocodone/Acetaminophen (Hydrocodone-Acetamin 5-325 mg) 1 Each Tablet, 1 TAB 

PO Q4H PRN for PAIN-MODERATE (5-7)


   Prescribed by: SOHAM JAMISON on 10/6/21 2035


Hyoscyamine Sulfate (Levsin-Sl) 0.125 Mg Tab.subl, 0.25 MG SL Q4H


   Prescribed by: RIDDHI VAUGHN on 10/12/21 1849


L. Acidophilus/Pectin, Citrus (Acidophilus Capsule) 1 Each Capsule, 2 EACH PO 

QID


   Prescribed by: RIDDHI VAUGHN on 10/12/21 1850


Metoprolol Succinate (Metoprolol Succinate) 50 Mg Tab.er.24h, 50 MG PO, 

(Reported)


   Entered as Reported by: EARNEST BARKLEY on 1/24/21 2206


Nitroglycerin (Nitroglycerin) 0.4 Mg Tab.subl, 0.4 MG SL, (Reported)


   Entered as Reported by: EARNEST BARKLEY on 1/24/21 2206


Olanzapine (Olanzapine) 5 Mg Tablet, 5 MG PO, (Reported)


   Entered as Reported by: EARNEST BARKLEY on 1/24/21 2206


Omeprazole (Omeprazole) 20 Mg Capsule.dr, 20 MG PO BID


   Prescribed by: SOHAM JAMISON on 12/28/20 1427


Ondansetron (Ondansetron Odt) 4 Mg Tab.rapdis, 4 MG PO Q8H PRN for nausea


   Prescribed by: JESSICA MCGREGOR on 5/25/21 1215


Ondansetron (Ondansetron Odt) 8 Mg Tab.rapdis, 8 MG PO Q4H PRN for 

NAUSEA/VOMITING


   Prescribed by: RIDDHI VAUGHN on 5/28/21 2350


Ondansetron (Ondansetron Odt) 4 Mg Tab.rapdis, 4 MG PO Q4H PRN for 

NAUSEA/VOMITING


   Prescribed by: SOHAM JAMISON on 10/6/21 2035


Ondansetron (Ondansetron Odt) 4 Mg Tab.rapdis, 8 MG PO Q4H


   Prescribed by: RIDDHI VAUGHN on 10/12/21 1849


Pantoprazole Sodium (Protonix) 40 Mg Tablet.dr, 40 MG PO DAILY


   Prescribed by: RIDDHI VAUGHN on 5/28/21 2350


Pantoprazole Sodium (Protonix) 40 Mg Tablet.dr, 40 MG PO DAILY


   Prescribed by: RIDDHI VAUGHN on 10/12/21 1849


Sucralfate (Carafate) 1 Gm Tablet, 1 GM PO QID


   Prescribed by: RIDDHI VAUGHN on 10/12/21 1849





Review of Systems


Review of Systems


Constitutional:  see HPI, chills


Respiratory:  No Symptoms Reported


Cardiovascular:  No Symptoms Reported


Gastrointestinal:  See HPI, Abdominal Pain, Diarrhea, Nausea, Poor Appetite, Po

or Fluid Intake, Vomiting


Genitourinary:  See HPI


Musculoskeletal:  no symptoms reported


Skin:  no symptoms reported


Psychiatric/Neurological:  See HPI, Headache


Endocrine:  No Symptoms Reported


Hematologic/Lymphatic:  No Symptoms Reported





Past Medical-Social-Family Hx


Patient Social History


Tobacco Use?:  Yes


Tobacco type used:  Cigarettes


Smoking Status:  Current Everyday Smoker


Substance use?:  No


Alcohol Use?:  Yes


Alcohol Frequency:  Several times a month





Immunizations Up To Date


First/Initial COVID19 Vaccinat:  07/2021


Second COVID19 Vaccination Bo:  06/2021





Past Medical History


Surgeries:  Yes (KIDNEY STONES--BASKET REMOVAL, )


Adenoidectomy, Cardiac, Renal, Tonsillectomy, Vasectomy


Respiratory:  No


Cardiac:  No


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


Stroke


Reproductive Disorders:  No


Genitourinary:  Yes (EPIDIDYMITIS)


Kidney Stones


Gastrointestinal:  Yes (CHRONIC GI COMPLAINTS)


Musculoskeletal:  Yes


Chronic Back Pain


Endocrine:  Yes (hypoglycemia)


HEENT:  Yes (TONSILLECTOMY)


Tonsilitis


Cancer:  No


Psychosocial:  Yes


Anxiety, PTSD


Integumentary:  No


Blood Disorders:  No





Family Medical History





SOCIAL HISTORY:


-ETOH--OCCASIONAL USE


-DRUGS--DENIES USE, BUT UDS HAS BEEN + FOR THC


-SMOKES 1 PPD





PAST SURGICAL HISTORY:


-KIDNEY STONES-BASKET REMOVAL


-TONSILLECTOMY/ADENOIDECTOMY


-VASECTOMY





-CARDIAC CATH 12/15/20 BY :





CONCLUSIONS:


1.  No angiographically significant coronary artery disease.


2.  Normal left ventricular end-diastolic pressure.


3.  Normal left ventricular systolic function with ejection fraction of


55% to


60%.





DISCUSSION AND RECOMMENDATIONS:


Based on results of the study, chest discomfort does not appear to be of


coronary origin.  Risk factor modification is advised.  Have advised to


refrain


from tobacco use.  Other risk factor modification has also been discussed. 


Outpatient followup is advised.





Physical Exam


Vital Signs





Vital Signs - First Documented








 10/12/21





 17:52


 


Temp 37.1


 


Pulse 67


 


Resp 19


 


B/P (MAP) 151/99 (116)


 


Pulse Ox 96





Capillary Refill :


Height/Weight/BMI


Height: 6'"


Weight: 242lbs. oz. [id number]; 210.00 BMI


Method:Stated


General Appearance:  WD/WN, no apparent distress, other (DOES NOT APPEAR ILL OR 

TO BE IN ANY DISCOMFORT OR DISTRESS WHATSOEVER. RESTING QUIETLY, COMPLETELY 

OUTSTRETCHED, MOVES WITHOUT DIFFICULTY)


HEENT:  PERRL/EOMI, other (ORAL MUCOSA MOIST)


Respiratory:  normal breath sounds, no respiratory distress, no accessory muscle

use


Cardiovascular:  regular rate, rhythm, no murmur


Gastrointestinal:  normal bowel sounds, soft, no organomegaly, no pulsatile 

mass, tenderness (MILD DIFFUSE LOWER ABDOMINAL TENDERNESS)


Rectal:  normal exam, normal rectal tone, heme negative stool; No hemorrhoids, 

No mass, No tenderness; other (NO STOOL IN RECTUM--HEMOCCULT NEGATIVE)


Extremities:  normal inspection


Neurologic/Psychiatric:  CNs II-XII nml as tested, no motor/sensory deficits, 

alert, normal mood/affect, oriented x 3


Skin:  normal color, warm/dry, tattoos/piercings (TATTOOS)





Progress/Results/Core Measures


Results/Orders


Lab Results





Laboratory Tests








Test


 10/12/21


18:00 10/12/21


18:27 Range/Units


 


 


White Blood Count


 6.4 


 


 4.3-11.0


10^3/uL


 


Red Blood Count


 4.81 


 


 4.30-5.52


10^6/uL


 


Hemoglobin 15.9   13.3-17.7  g/dL


 


Hematocrit 47   40-54  %


 


Mean Corpuscular Volume 97   80-99  fL


 


Mean Corpuscular Hemoglobin 33   25-34  pg


 


Mean Corpuscular Hemoglobin


Concent 34 


 


 32-36  g/dL





 


Red Cell Distribution Width 13.2   10.0-14.5  %


 


Platelet Count


 228 


 


 130-400


10^3/uL


 


Mean Platelet Volume 9.2   9.0-12.2  fL


 


Immature Granulocyte % (Auto) 0    %


 


Neutrophils (%) (Auto) 61   42-75  %


 


Lymphocytes (%) (Auto) 24   12-44  %


 


Monocytes (%) (Auto) 8   0-12  %


 


Eosinophils (%) (Auto) 5   0-10  %


 


Basophils (%) (Auto) 1   0-10  %


 


Neutrophils # (Auto)


 3.9 


 


 1.8-7.8


10^3/uL


 


Lymphocytes # (Auto)


 1.6 


 


 1.0-4.0


10^3/uL


 


Monocytes # (Auto)


 0.5 


 


 0.0-1.0


10^3/uL


 


Eosinophils # (Auto)


 0.3 


 


 0.0-0.3


10^3/uL


 


Basophils # (Auto)


 0.1 


 


 0.0-0.1


10^3/uL


 


Immature Granulocyte # (Auto)


 0.0 


 


 0.0-0.1


10^3/uL


 


Percent Immature Platelet


Fraction 2.4 


 


 0.0-7.6  %





 


Sodium Level 138   135-145  MMOL/L


 


Potassium Level 4.2   3.6-5.0  MMOL/L


 


Chloride Level 106     MMOL/L


 


Carbon Dioxide Level 20 L  21-32  MMOL/L


 


Anion Gap 12   5-14  MMOL/L


 


Blood Urea Nitrogen 6 L  7-18  MG/DL


 


Creatinine


 0.91 


 


 0.60-1.30


MG/DL


 


Estimat Glomerular Filtration


Rate 91 


 


  





 


BUN/Creatinine Ratio 7    


 


Glucose Level 95     MG/DL


 


Calcium Level 9.0   8.5-10.1  MG/DL


 


Corrected Calcium 9.1   8.5-10.1  MG/DL


 


Magnesium Level 2.2   1.6-2.4  MG/DL


 


Total Bilirubin 0.6   0.1-1.0  MG/DL


 


Aspartate Amino Transf


(AST/SGOT) 26 


 


 5-34  U/L





 


Alanine Aminotransferase


(ALT/SGPT) 24 


 


 0-55  U/L





 


Alkaline Phosphatase 76     U/L


 


Total Protein 6.8   6.4-8.2  GM/DL


 


Albumin 3.9   3.2-4.5  GM/DL


 


Amylase Level 40     U/L


 


Lipase 16   8-78  U/L


 


Serum Alcohol < 10   <10  MG/DL


 


Urine Color  YELLOW   


 


Urine Clarity  CLEAR   


 


Urine pH  7.0  5-9  


 


Urine Specific Gravity  1.010 L 1.016-1.022  


 


Urine Protein  NEGATIVE  NEGATIVE  


 


Urine Glucose (UA)  NEGATIVE  NEGATIVE  


 


Urine Ketones  NEGATIVE  NEGATIVE  


 


Urine Nitrite  NEGATIVE  NEGATIVE  


 


Urine Bilirubin  NEGATIVE  NEGATIVE  


 


Urine Urobilinogen  1.0  < = 1.0  MG/DL


 


Urine Leukocyte Esterase  NEGATIVE  NEGATIVE  


 


Urine RBC (Auto)  NEGATIVE  NEGATIVE  


 


Urine RBC  NONE   /HPF


 


Urine WBC  NONE   /HPF


 


Urine Squamous Epithelial


Cells 


 0-2 


  /HPF





 


Urine Crystals  PRESENT H  /LPF


 


Urine Amorphous Sediment


 


 FEW AGGIE


PHOSPHATE H  /LPF





 


Urine Bacteria  FEW H  /HPF


 


Urine Casts  NONE   /LPF


 


Urine Mucus  NEGATIVE   /LPF


 


Urine Culture Indicated  YES   








My Orders





Orders - RIDDHI VAUGHN DO


Ed Iv/Invasive Line Start (10/12/21 17:55)


Monitor-Rhythm Ecg Trace Only (10/12/21 17:55)


Alcohol (10/12/21 17:55)


Amylase (10/12/21 17:55)


Cbc With Automated Diff (10/12/21 17:55)


Comprehensive Metabolic Panel (10/12/21 17:55)


Drug Screen Stat (Urine) (10/12/21 17:55)


Lipase (10/12/21 17:55)


Magnesium (10/12/21 17:55)


Ua Culture If Indicated (10/12/21 17:55)


Ed Iv/Invasive Line Start (10/12/21 17:55)


Lactated Ringers (Lr 1000 Ml Iv Solution (10/12/21 18:00)


Pantoprazole Injection (Protonix Injecti (10/12/21 18:00)


Ketorolac Injection (Toradol Injection) (10/12/21 18:45)


Urine Culture (10/12/21 18:27)


Fecal Occult Bedside (10/12/21 18:56)





Medications Given in ED





Current Medications








 Medications  Dose


 Ordered  Sig/Duy


 Route  Start Time


 Stop Time Status Last Admin


Dose Admin


 


 Ketorolac


 Tromethamine  30 mg  ONCE  ONCE


 IVP  10/12/21 18:45


 10/12/21 18:46 DC 10/12/21 18:50


30 MG


 


 Lactated Ringer's  1,000 ml @ 


 0 mls/hr  Q0M ONCE


 IV  10/12/21 18:00


 10/12/21 18:01 DC 10/12/21 18:38


1,000 MLS/HR


 


 Pantoprazole  40 mg  ONCE  ONCE


 IV  10/12/21 18:00


 10/12/21 18:01 DC 10/12/21 18:38


40 MG








Vital Signs/I&O











 10/12/21





 17:52


 


Temp 37.1


 


Pulse 67


 


Resp 19


 


B/P (MAP) 151/99 (116)


 


Pulse Ox 96











Progress


Progress Note :  


Progress Note


GIVEN IV FLUIDS, PROTONIX, TORADOL





NO NAUSEA/VOMITING OR DIARRHEA DURING ER STAY





WALKS UPRIGHT AND MOVES WITHOUT DIFFICULTY





UNEVENTFUL ER STAY





Departure


Impression





   Primary Impression:  


   Nausea vomiting and diarrhea


   Additional Impression:  


   Lower abdominal pain


Disposition:  01 HOME, SELF-CARE


Condition:  Stable





Departure-Patient Inst.


Decision time for Depature:  18:45


Referrals:  


[facility] (PCP)


Primary Care Physician








HENOK JULIAN (Family)


Primary Care Physician








NANY PHILIP DO


Patient Instructions:  Abdominal Pain, Adult ED, Nausea and Vomiting, Adult ED





Add. Discharge Instructions:  


LOTS OF CLEAR LIQUIDS--WATER, BROTH, JELLO, GATORADE


BRATS DIET--BANANAS, RICE, APPLESAUCE, TOAST, SALTINES





FOLLOW UP WITH DR. PHILIP, GENERAL SURGEON, FOR FURTHER CARE--CALL IN AM TO [facility] APPOINTMENT


Scripts


L. Acidophilus/Pectin, Citrus (Acidophilus Capsule) 1 Each Capsule


2 EACH PO QID, #40 CAP


   Prov: DANI VAUGHNA K DO         10/12/21 


Hyoscyamine Sulfate (Levsin-Sl) 0.125 Mg Tab.subl


0.25 MG SL Q4H, #15 TAB


   Prov: DANI VAUGHNA K DO         10/12/21 


Ondansetron (Ondansetron Odt) 4 Mg Tab.rapdis


8 MG PO Q4H for Nausea/Vomiting, #10 TAB


   Prov: JOHANADANIA K DO         10/12/21 


Sucralfate (Carafate) 1 Gm Tablet


1 GM PO QID, #60 TAB


   Prov: JOHANA,RIDDHI K DO         10/12/21 


Pantoprazole Sodium (Protonix) 40 Mg Tablet.dr


40 MG PO DAILY, #15 TAB


   Prov: DANI VAUGHNA K DO         10/12/21











JOHANARIDDHI K DO                 Oct 12, 2021 18:06

## 2021-10-20 ENCOUNTER — HOSPITAL ENCOUNTER (EMERGENCY)
Dept: HOSPITAL 75 - ER | Age: 41
Discharge: HOME | End: 2021-10-20
Payer: MEDICAID

## 2021-10-20 VITALS — BODY MASS INDEX: 31.64 KG/M2 | HEIGHT: 71.65 IN | WEIGHT: 231.04 LBS

## 2021-10-20 VITALS — DIASTOLIC BLOOD PRESSURE: 86 MMHG | SYSTOLIC BLOOD PRESSURE: 130 MMHG

## 2021-10-20 VITALS — SYSTOLIC BLOOD PRESSURE: 159 MMHG | DIASTOLIC BLOOD PRESSURE: 101 MMHG

## 2021-10-20 VITALS — DIASTOLIC BLOOD PRESSURE: 105 MMHG | SYSTOLIC BLOOD PRESSURE: 144 MMHG

## 2021-10-20 DIAGNOSIS — Z79.891: ICD-10-CM

## 2021-10-20 DIAGNOSIS — Z86.73: ICD-10-CM

## 2021-10-20 DIAGNOSIS — R11.2: ICD-10-CM

## 2021-10-20 DIAGNOSIS — G89.29: ICD-10-CM

## 2021-10-20 DIAGNOSIS — M54.9: ICD-10-CM

## 2021-10-20 DIAGNOSIS — Z72.0: ICD-10-CM

## 2021-10-20 DIAGNOSIS — R10.9: Primary | ICD-10-CM

## 2021-10-20 LAB
ALBUMIN SERPL-MCNC: 4 GM/DL (ref 3.2–4.5)
ALP SERPL-CCNC: 91 U/L (ref 40–136)
ALT SERPL-CCNC: 23 U/L (ref 0–55)
BARBITURATES UR QL: NEGATIVE
BASOPHILS # BLD AUTO: 0.1 10^3/UL (ref 0–0.1)
BASOPHILS NFR BLD AUTO: 1 % (ref 0–10)
BENZODIAZ UR QL SCN: NEGATIVE
BILIRUB SERPL-MCNC: 0.7 MG/DL (ref 0.1–1)
BUN/CREAT SERPL: 4
CALCIUM SERPL-MCNC: 9 MG/DL (ref 8.5–10.1)
CHLORIDE SERPL-SCNC: 105 MMOL/L (ref 98–107)
CO2 SERPL-SCNC: 22 MMOL/L (ref 21–32)
COCAINE UR QL: NEGATIVE
CREAT SERPL-MCNC: 1.05 MG/DL (ref 0.6–1.3)
EOSINOPHIL # BLD AUTO: 0.3 10^3/UL (ref 0–0.3)
EOSINOPHIL NFR BLD AUTO: 5 % (ref 0–10)
GFR SERPLBLD BASED ON 1.73 SQ M-ARVRAT: 77 ML/MIN
GLUCOSE SERPL-MCNC: 126 MG/DL (ref 70–105)
HCT VFR BLD CALC: 49 % (ref 40–54)
HGB BLD-MCNC: 16.3 G/DL (ref 13.3–17.7)
LIPASE SERPL-CCNC: 26 U/L (ref 8–78)
LYMPHOCYTES # BLD AUTO: 1.1 10^3/UL (ref 1–4)
LYMPHOCYTES NFR BLD AUTO: 17 % (ref 12–44)
MANUAL DIFFERENTIAL PERFORMED BLD QL: NO
MCH RBC QN AUTO: 32 PG (ref 25–34)
MCHC RBC AUTO-ENTMCNC: 34 G/DL (ref 32–36)
MCV RBC AUTO: 96 FL (ref 80–99)
METHADONE UR QL SCN: NEGATIVE
METHAMPHETAMINE SCREEN URINE S: NEGATIVE
MONOCYTES # BLD AUTO: 0.5 10^3/UL (ref 0–1)
MONOCYTES NFR BLD AUTO: 9 % (ref 0–12)
NEUTROPHILS # BLD AUTO: 4.4 10^3/UL (ref 1.8–7.8)
NEUTROPHILS NFR BLD AUTO: 69 % (ref 42–75)
OPIATES UR QL SCN: NEGATIVE
OXYCODONE UR QL: NEGATIVE
PLATELET # BLD: 256 10^3/UL (ref 130–400)
PMV BLD AUTO: 8.8 FL (ref 9–12.2)
POTASSIUM SERPL-SCNC: 3.2 MMOL/L (ref 3.6–5)
PROPOXYPH UR QL: NEGATIVE
PROT SERPL-MCNC: 6.8 GM/DL (ref 6.4–8.2)
SODIUM SERPL-SCNC: 140 MMOL/L (ref 135–145)
TRICYCLICS UR QL SCN: NEGATIVE
WBC # BLD AUTO: 6.3 10^3/UL (ref 4.3–11)

## 2021-10-20 PROCEDURE — 85025 COMPLETE CBC W/AUTO DIFF WBC: CPT

## 2021-10-20 PROCEDURE — 80320 DRUG SCREEN QUANTALCOHOLS: CPT

## 2021-10-20 PROCEDURE — 36415 COLL VENOUS BLD VENIPUNCTURE: CPT

## 2021-10-20 PROCEDURE — 80053 COMPREHEN METABOLIC PANEL: CPT

## 2021-10-20 PROCEDURE — 70450 CT HEAD/BRAIN W/O DYE: CPT

## 2021-10-20 PROCEDURE — 99283 EMERGENCY DEPT VISIT LOW MDM: CPT

## 2021-10-20 PROCEDURE — 72125 CT NECK SPINE W/O DYE: CPT

## 2021-10-20 PROCEDURE — 93005 ELECTROCARDIOGRAM TRACING: CPT

## 2021-10-20 PROCEDURE — 83690 ASSAY OF LIPASE: CPT

## 2021-10-20 PROCEDURE — 80306 DRUG TEST PRSMV INSTRMNT: CPT

## 2021-10-20 NOTE — ED GENERAL
General


Chief Complaint:  General Problems/Pain


Stated Complaint:  FALL, SEIZURES


Nursing Triage Note:  


HA, N/V/D, HIT HEAD, DIZZINESS.


Source of Information:  Patient


Exam Limitations:  No Limitations





History of Present Illness


Date Seen by Provider:  Oct 20, 2021


Time Seen by Provider:  20:49


Initial Comments


To ER by EMS from home with reports of ongoing nausea vomiting and abdominal 

pain.  This nausea and vomiting has caused him to become lightheaded which 

caused him to fall striking the back of his head.  He was dazed but did not lose

consciousness.  He had some twitching activity at home of the upper body. He did

have a 6 pack of beer this morning.


Timing/Duration:  1-2 Days


Severity:  Moderate


Associated Systoms:  Nausea/Vomiting





Allergies and Home Medications


Allergies


Coded Allergies:  


     Sulfa (Sulfonamide Antibiotics) (Verified  Allergy, Severe, HIVES, 11/4/20)


     levofloxacin (Verified  Allergy, Unknown, 9/2/20)


     doxycycline (Verified  Adverse Reaction, Mild, Vomiting, 9/2/20)





Patient Home Medication List


Home Medication List Reviewed:  Yes


Acetaminophen (Tylenol Extra Strength) 500 Mg Tablet, 1,000 MG PO Q8H PRN for 

PAIN-MILD (1-4), (Reported)


   Entered as Reported by: CHRIS ALSTON on 12/15/20 0928


Benzonatate (Tessalon Perles) 100 Mg Capsule, 200 MG PO TID


   Prescribed by: RIDDHI VAUGHN on 5/28/21 2350


Guaifenesin/Dextromethorphan (Mucinex Dm ER 1,200-60 mg Tab) 1 Each Tbmp.12hr, 1

EACH PO BID


   Prescribed by: RIDDHI VAUGHN on 5/28/21 2350


Hydrocodone/Acetaminophen (Hydrocodone-Acetamin 5-325 mg) 1 Each Tablet, 1 TAB 

PO Q4H PRN for PAIN-MODERATE (5-7)


   Prescribed by: SOHAM JAMISON on 10/6/21 2035


Hyoscyamine Sulfate (Levsin-Sl) 0.125 Mg Tab.subl, 0.25 MG SL Q4H


   Prescribed by: RIDDHI VAUGHN on 10/12/21 1849


L. Acidophilus/Pectin, Citrus (Acidophilus Capsule) 1 Each Capsule, 2 EACH PO 

QID


   Prescribed by: RIDDHI VAUGHN on 10/12/21 1850


Metoprolol Succinate (Metoprolol Succinate) 50 Mg Tab.er.24h, 50 MG PO, 

(Reported)


   Entered as Reported by: EARNEST BARKLEY on 1/24/21 2206


Nitroglycerin (Nitroglycerin) 0.4 Mg Tab.subl, 0.4 MG SL, (Reported)


   Entered as Reported by: EARNEST BARKLEY on 1/24/21 2206


Olanzapine (Olanzapine) 5 Mg Tablet, 5 MG PO, (Reported)


   Entered as Reported by: EARNEST BAKRLEY on 1/24/21 2206


Omeprazole (Omeprazole) 20 Mg Capsule.dr, 20 MG PO BID


   Prescribed by: SOHAM JAMISON on 12/28/20 1427


Ondansetron (Ondansetron Odt) 4 Mg Tab.rapdis, 4 MG PO Q8H PRN for nausea


   Prescribed by: JESSICA MCGREGOR on 5/25/21 1215


Ondansetron (Ondansetron Odt) 8 Mg Tab.rapdis, 8 MG PO Q4H PRN for 

NAUSEA/VOMITING


   Prescribed by: RIDDHI VAUGHN on 5/28/21 2350


Ondansetron (Ondansetron Odt) 4 Mg Tab.rapdis, 4 MG PO Q4H PRN for 

NAUSEA/VOMITING


   Prescribed by: SOHAM JAMISON on 10/6/21 2035


Ondansetron (Ondansetron Odt) 4 Mg Tab.rapdis, 8 MG PO Q4H


   Prescribed by: RIDDHI VAUGHN on 10/12/21 1849


Pantoprazole Sodium (Protonix) 40 Mg Tablet.dr, 40 MG PO DAILY


   Prescribed by: RIDDHI VAUGHN on 5/28/21 2350


Pantoprazole Sodium (Protonix) 40 Mg Tablet.dr, 40 MG PO DAILY


   Prescribed by: RIDDHI VAUGHN on 10/12/21 1849


Sucralfate (Carafate) 1 Gm Tablet, 1 GM PO QID


   Prescribed by: RIDDHI VAUGHN on 10/12/21 1849





Review of Systems


Review of Systems


Constitutional:  see HPI


EENTM:  see HPI


Respiratory:  no symptoms reported


Cardiovascular:  no symptoms reported


Gastrointestinal:  abdominal pain, nausea, vomiting


Genitourinary:  no symptoms reported


Musculoskeletal:  no symptoms reported


Skin:  no symptoms reported


Psychiatric/Neurological:  No Symptoms Reported


Hematologic/Lymphatic:  No Symptoms Reported





Past Medical-Social-Family Hx


Patient Social History


Tobacco Use?:  Yes


Tobacco type used:  Cigarettes


Substance use?:  No


Alcohol Use?:  Yes


Alcohol Frequency:  Once in a while


Pt feels they are or have been:  No





Immunizations Up To Date


First/Initial COVID19 Vaccinat:  3/21


Second COVID19 Vaccination Bo:  4/21


COVID19 Vaccine :  PFIZER





Past Medical History


Surgery/Hospitalization HX:  


RENAL STONES, APPY,VASECTOMY, T/A, CVA 2019, ANXIETY, PTSD


Surgeries:  Yes (KIDNEY STONES--BASKET REMOVAL, )


Adenoidectomy, Cardiac, Renal, Tonsillectomy, Vasectomy


Respiratory:  No


Cardiac:  No


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


Stroke


Reproductive Disorders:  No


Genitourinary:  Yes (EPIDIDYMITIS)


Kidney Stones


Gastrointestinal:  Yes (CHRONIC GI COMPLAINTS)


Musculoskeletal:  Yes


Chronic Back Pain


Endocrine:  Yes (hypoglycemia)


HEENT:  Yes (TONSILLECTOMY)


Tonsilitis


Cancer:  No


Psychosocial:  Yes


Anxiety, PTSD


Integumentary:  No


Blood Disorders:  No





Family Medical History





SOCIAL HISTORY:


-ETOH--OCCASIONAL USE


-DRUGS--DENIES USE, BUT UDS HAS BEEN + FOR THC


-SMOKES 1 PPD





PAST SURGICAL HISTORY:


-KIDNEY STONES-BASKET REMOVAL


-TONSILLECTOMY/ADENOIDECTOMY


-VASECTOMY


-CARDIAC CATH 12/15/20 BY :


CONCLUSIONS:


1.  No angiographically significant coronary artery disease.


2.  Normal left ventricular end-diastolic pressure.


3.  Normal left ventricular systolic function with ejection fraction of


55% to


60%.


DISCUSSION AND RECOMMENDATIONS:


Based on results of the study, chest discomfort does not appear to be of


coronary origin.  Risk factor modification is advised.  Have advised to


refrain


from tobacco use.  Other risk factor modification has also been discussed. 


Outpatient followup is advised.





Physical Exam


Vital Signs





Vital Signs - First Documented








 10/20/21





 20:33


 


Temp 36.8


 


Pulse 80


 


Resp 16


 


B/P (MAP) 154/103 (120)


 


Pulse Ox 96


 


O2 Delivery Room Air





Capillary Refill : Less Than 3 Seconds


Height, Weight, BMI


Height: 6'"


Weight: 242lbs. oz. 109.319307dk; 31.00 BMI


Method:Stated


General Appearance:  No Apparent Distress, WD/WN


Eyes:  Bilateral Eye Normal Inspection, Bilateral Eye PERRL, Bilateral Eye EOMI


HEENT:  PERRL/EOMI, TMs Normal


Neck:  Full Range of Motion, Normal Inspection


Respiratory:  No Accessory Muscle Use, No Respiratory Distress


Cardiovascular:  Regular Rate, Rhythm, Normal Peripheral Pulses


Gastrointestinal:  Normal Bowel Sounds, Soft


Extremity:  Normal Capillary Refill, Normal Inspection


Neurologic/Psychiatric:  Alert, Oriented x3


Skin:  Normal Color, Warm/Dry





Progress/Results/Core Measures


Suspected Sepsis


SIRS


Temperature: 


Pulse: 80 


Respiratory Rate: 16


 


Laboratory Tests


10/20/21 20:53: White Blood Count 6.3


Blood Pressure 154 /103 


Mean: 120


 





Laboratory Tests


10/20/21 20:53: 


Creatinine 1.05, Platelet Count 256, Total Bilirubin 0.7








Results/Orders


Lab Results





Laboratory Tests








Test


 10/20/21


20:53 10/20/21


22:04 Range/Units


 


 


White Blood Count


 6.3 


 


 4.3-11.0


10^3/uL


 


Red Blood Count


 5.03 


 


 4.30-5.52


10^6/uL


 


Hemoglobin 16.3   13.3-17.7  g/dL


 


Hematocrit 49   40-54  %


 


Mean Corpuscular Volume 96   80-99  fL


 


Mean Corpuscular Hemoglobin 32   25-34  pg


 


Mean Corpuscular Hemoglobin


Concent 34 


 


 32-36  g/dL





 


Red Cell Distribution Width 13.5   10.0-14.5  %


 


Platelet Count


 256 


 


 130-400


10^3/uL


 


Mean Platelet Volume 8.8 L  9.0-12.2  fL


 


Immature Granulocyte % (Auto) 0    %


 


Neutrophils (%) (Auto) 69   42-75  %


 


Lymphocytes (%) (Auto) 17   12-44  %


 


Monocytes (%) (Auto) 9   0-12  %


 


Eosinophils (%) (Auto) 5   0-10  %


 


Basophils (%) (Auto) 1   0-10  %


 


Neutrophils # (Auto)


 4.4 


 


 1.8-7.8


10^3/uL


 


Lymphocytes # (Auto)


 1.1 


 


 1.0-4.0


10^3/uL


 


Monocytes # (Auto)


 0.5 


 


 0.0-1.0


10^3/uL


 


Eosinophils # (Auto)


 0.3 


 


 0.0-0.3


10^3/uL


 


Basophils # (Auto)


 0.1 


 


 0.0-0.1


10^3/uL


 


Immature Granulocyte # (Auto)


 0.0 


 


 0.0-0.1


10^3/uL


 


Sodium Level 140   135-145  MMOL/L


 


Potassium Level 3.2 L  3.6-5.0  MMOL/L


 


Chloride Level 105     MMOL/L


 


Carbon Dioxide Level 22   21-32  MMOL/L


 


Anion Gap 13   5-14  MMOL/L


 


Blood Urea Nitrogen 4 L  7-18  MG/DL


 


Creatinine


 1.05 


 


 0.60-1.30


MG/DL


 


Estimat Glomerular Filtration


Rate 77 


 


  





 


BUN/Creatinine Ratio 4    


 


Glucose Level 126 H    MG/DL


 


Calcium Level 9.0   8.5-10.1  MG/DL


 


Corrected Calcium 9.0   8.5-10.1  MG/DL


 


Total Bilirubin 0.7   0.1-1.0  MG/DL


 


Aspartate Amino Transf


(AST/SGOT) 16 


 


 5-34  U/L





 


Alanine Aminotransferase


(ALT/SGPT) 23 


 


 0-55  U/L





 


Alkaline Phosphatase 91     U/L


 


Total Protein 6.8   6.4-8.2  GM/DL


 


Albumin 4.0   3.2-4.5  GM/DL


 


Lipase 26   8-78  U/L


 


Serum Alcohol < 10   <10  MG/DL


 


Urine Opiates Screen  NEGATIVE  NEGATIVE  


 


Urine Oxycodone Screen  NEGATIVE  NEGATIVE  


 


Urine Methadone Screen  NEGATIVE  NEGATIVE  


 


Urine Propoxyphene Screen  NEGATIVE  NEGATIVE  


 


Urine Barbiturates Screen  NEGATIVE  NEGATIVE  


 


Ur Tricyclic Antidepressants


Screen 


 NEGATIVE 


 NEGATIVE  





 


Urine Phencyclidine Screen  NEGATIVE  NEGATIVE  


 


Urine Amphetamines Screen  NEGATIVE  NEGATIVE  


 


Urine Methamphetamines Screen  NEGATIVE  NEGATIVE  


 


Urine Benzodiazepines Screen  NEGATIVE  NEGATIVE  


 


Urine Cocaine Screen  NEGATIVE  NEGATIVE  


 


Urine Cannabinoids Screen  NEGATIVE  NEGATIVE  








My Orders





Orders - BRYAN AGUILAR


Cbc With Automated Diff (10/20/21 20:36)


Comprehensive Metabolic Panel (10/20/21 20:36)


Ekg Tracing (10/20/21 20:36)


Lipase (10/20/21 20:36)


Orthostatic Vital Signs (Adult (10/20/21 20:36)


Ed Iv/Invasive Line Start (10/20/21 20:36)


Ct Head/Cervical Spine Wo (10/20/21 20:36)


Ns Iv 1000 Ml (Sodium Chloride 0.9%) (10/20/21 20:45)


Promethazine Injection (Phenergan Injec (10/20/21 20:45)


Ketorolac Injection (Toradol Injection) (10/20/21 20:45)


Alcohol (10/20/21 21:37)


Drug Screen Stat (Urine) (10/20/21 21:37)


Antacid  Suspension (Mylanta  Suspension (10/20/21 21:45)


Lidocaine 2% Viscous 15 Ml (Xylocaine Vi (10/20/21 21:45)





Medications Given in ED





Vital Signs/I&O











 10/20/21 10/20/21 10/20/21





 20:33 20:46 22:36


 


Temp 36.8  36.6


 


Pulse 80 84 76





  89 





  95 


 


Resp 16  18


 


B/P (MAP) 154/103 (120) 159/101 (120) 130/86





  149/100 (116) 





  144/105 (118) 


 


Pulse Ox 96  97


 


O2 Delivery Room Air  Room Air





Capillary Refill : Less Than 3 Seconds








Blood Pressure Mean:                    120











Departure


Communication (Admissions)


he is scheduled for gallbladder US this friday he informs me





Impression





   Primary Impression:  


   Abdominal pain


   Additional Impression:  


   Nausea and vomiting


Disposition:  01 HOME, SELF-CARE


Condition:  Stable





Departure-Patient Inst.


Decision time for Depature:  21:36


Referrals:  


[facility] (PCP)


Primary Care Physician








HENOK JULIAN (Family)


Primary Care Physician


Patient Instructions:  NO INSTRUCTIONS GIVEN











BRYAN AGUILAR APRN             Oct 20, 2021 20:51

## 2021-10-20 NOTE — DIAGNOSTIC IMAGING REPORT
PROCEDURE: CT head and CT cervical spine without contrast.



TECHNIQUE: Multiple contiguous axial images were obtained through

the brain and cervical spine without the use of intravenous

contrast. Sagittal and coronal reformations through the cervical

spine were then performed. Auto Exposure Controls were utilized

during the CT exam to meet ALARA standards for radiation dose

reduction. 



INDICATION: Fall. Seizure.



COMPARISON: 05/28/2021.



FINDINGS: 



CT head: Screws and cortical sulci are normal in size and

contour. There is no midline shift or mass-effect. No acute

intra-axial hemorrhage is seen. There is no abnormal area of

increased or decreased density to suggest acute hemorrhage or

edema. No extra-axial mass or collection is present. The bony

calvarium is intact. The visualized paranasal sinuses are

unremarkable. The mastoid air cells are clear.



CT cervical spine: Evaluation of static alignment shows slight

grade 1 anterolisthesis at the C6 level. There is no evidence of

jumped facets. Vertebral body heights are maintained. There is no

acute fracture. No bony fragments are seen within the spinal

canal. Moderate multilevel degenerative changes are noted

consistent with intervertebral disc height loss with anterior and

posterior disc osteophyte complex formations, as well as

multilevel facet arthropathy. These changes are also greatest at

C6-C7 level.



Prevertebral and paravertebral soft tissue structures are

unremarkable. Included portions of lung apices are clear.



IMPRESSION:  

1. No acute intracranial abnormality. No CT evidence of mass,

acute infarct or intracranial hemorrhage.

2. No acute fracture or dislocation of the cervical spine. 



Dictated by: 



  Dictated on workstation # [id number]

## 2021-10-22 ENCOUNTER — HOSPITAL ENCOUNTER (OUTPATIENT)
Dept: HOSPITAL 75 - RAD | Age: 41
End: 2021-10-22
Attending: SURGERY
Payer: MEDICAID

## 2021-10-22 DIAGNOSIS — K76.0: Primary | ICD-10-CM

## 2021-10-22 PROCEDURE — 76705 ECHO EXAM OF ABDOMEN: CPT

## 2021-10-22 NOTE — DIAGNOSTIC IMAGING REPORT
PROCEDURE: US Gallbladder.



TECHNIQUE: Multiple real-time grayscale images were obtained over

the right upper quadrant in various projections.



INDICATION:  Epigastric pain



COMPARISON: CT from 10/06/2021



FINDINGS:



The pancreas is not seen due to overlying bowel gas. The imaged

portions of the aorta and IVC are unremarkable.



The liver is mildly large measuring 19 cm in length. Echogenicity

appears increased. No focal lesions are seen. No biliary

dilatation is seen. The gallbladder wall is not thickened. No

stones are seen. Sonographic De Leon sign is negative. The main

portal vein is hepatopetal. The common bile duct is not visible.



The right kidney measures 12.4 cm in length. There is no

hydronephrosis. No free fluid is seen.



IMPRESSION:

1. No sonographic evidence of cholelithiasis or cholecystitis.

2. Hepatic steatosis.



Dictated by: 



  Dictated on workstation # [id number]

## 2021-10-26 ENCOUNTER — HOSPITAL ENCOUNTER (OUTPATIENT)
Dept: HOSPITAL 75 - PREOP | Age: 41
LOS: 6 days | Discharge: HOME | End: 2021-11-01
Attending: SURGERY
Payer: MEDICAID

## 2021-10-26 DIAGNOSIS — Z01.818: Primary | ICD-10-CM

## 2021-11-02 ENCOUNTER — HOSPITAL ENCOUNTER (OUTPATIENT)
Dept: HOSPITAL 75 - ENDO | Age: 41
Discharge: HOME | End: 2021-11-02
Attending: SURGERY
Payer: MEDICARE

## 2021-11-02 VITALS — SYSTOLIC BLOOD PRESSURE: 123 MMHG | DIASTOLIC BLOOD PRESSURE: 76 MMHG

## 2021-11-02 VITALS — SYSTOLIC BLOOD PRESSURE: 121 MMHG | DIASTOLIC BLOOD PRESSURE: 71 MMHG

## 2021-11-02 VITALS — SYSTOLIC BLOOD PRESSURE: 148 MMHG | DIASTOLIC BLOOD PRESSURE: 94 MMHG

## 2021-11-02 VITALS — SYSTOLIC BLOOD PRESSURE: 120 MMHG | DIASTOLIC BLOOD PRESSURE: 75 MMHG

## 2021-11-02 VITALS — WEIGHT: 231.04 LBS | BODY MASS INDEX: 31.29 KG/M2 | HEIGHT: 71.97 IN

## 2021-11-02 VITALS — SYSTOLIC BLOOD PRESSURE: 119 MMHG | DIASTOLIC BLOOD PRESSURE: 68 MMHG

## 2021-11-02 DIAGNOSIS — K21.00: ICD-10-CM

## 2021-11-02 DIAGNOSIS — Z79.82: ICD-10-CM

## 2021-11-02 DIAGNOSIS — K44.9: ICD-10-CM

## 2021-11-02 DIAGNOSIS — K42.9: ICD-10-CM

## 2021-11-02 DIAGNOSIS — F17.210: ICD-10-CM

## 2021-11-02 DIAGNOSIS — R19.7: ICD-10-CM

## 2021-11-02 DIAGNOSIS — Z79.899: ICD-10-CM

## 2021-11-02 DIAGNOSIS — K29.50: ICD-10-CM

## 2021-11-02 DIAGNOSIS — K62.1: ICD-10-CM

## 2021-11-02 DIAGNOSIS — K29.80: Primary | ICD-10-CM

## 2021-11-02 NOTE — PROGRESS NOTE-POST OPERATIVE
Post-Operative Progess Note


Surgeon (s)/Assistant (s)


Surgeon


NANY PHILIP DO


Assistant:  na





Pre-Operative Diagnosis


wt loss, gerd, epigastric abd pain





Post-Operative Diagnosis





duodenitis, slight hiatal hernia, cecal polyp





Procedure & Operative Findings


Date of Procedure


11/2/21


Procedure Performed/Findings


egd c biopsies, colonoscopy c hot bx polypectomy


Anesthesia Type


per crna





Estimated Blood Loss


Estimated blood loss (mL):  none





Specimens/Packing


Specimens Removed


duodenum, antrum and ge, cecal polyp











NANY PHILIP DO               Nov 2, 2021 15:29

## 2021-11-02 NOTE — ANESTHESIA-GENERAL POST-OP
MAC


Patient Condition


Mental Status/LOC:  Same as Preop


Cardiovascular:  Satisfactory


Nausea/Vomiting:  Absent


Respiratory:  Satisfactory


Pain:  Controlled


Complications:  Absent





Post Op Complications


Complications


None





Follow Up Care/Instructions


Patient Instructions


None needed.





Anesthesiology Discharge Order


Discharge Order


Patient is doing well, no complaints, stable vital signs, no apparent adverse 

anesthesia problems.   


No complications reported per nursing.











NASEEM PIERRE CRNA           Nov 2, 2021 16:59

## 2021-11-02 NOTE — OPERATIVE REPORT
DATE OF SERVICE:  11/02/2021



PREOPERATIVE DIAGNOSES:

Weight loss and epigastric abdominal pain.



POSTOPERATIVE DIAGNOSES:

Duodenitis, hiatal hernia, and cecal polyp.



PROCEDURES PERFORMED:

EGD with biopsies, colonoscopy with hot biopsy polypectomy.



SURGEON:

Nany Brownlee DO



ANESTHESIA:

Per CRNA.



ESTIMATED BLOOD LOSS:

None.



COMPLICATIONS:

None.



SPECIMENS:

Duodenum, antrum, GE junction and cecal polyp.



INDICATIONS FOR PROCEDURE:

The patient is a 41-year-old male with weight loss, GERD, and epigastric

abdominal pain.  He understands the risks and benefits of the procedure and

wished to proceed with procedure.  Consent was signed in the chart.



DESCRIPTION OF PROCEDURE:

The patient was taken to the endoscopy suite and placed in a left lateral

recumbent position.  Timeout was performed.  Scope was inserted in the mouth,

down the esophagus, stomach and into the duodenum without difficulty.  No

polyps, masses or ulcerations, but does have erythematous changes throughout the

duodenum consistent with duodenitis.  Biopsy of the duodenum was obtained. 

Scope was slowly retracted back into the stomach, where it was further

insufflated.  No polyps, masses or ulcerations.  Biopsy of the antrum was

obtained.  Scope was retroflexed noting no other pathology except for a small

sliding hiatal hernia.  Scope was returned to its normal position, slowly

withdrawn to the distal esophagus.  Biopsy of the GE junction was obtained. 

Scope was slowly retracted back until completely removed noting no other

pathology.  Digital rectal exam was performed.  No palpable polyps, masses or

ulcerations.  Scope was inserted in the rectum and advanced all the way to cecum

with minimal difficulty.  In the cecum, our prep was adequate with lots of

irrigation and suction.  Cecal polyp was present, which hot biopsy polypectomy

was performed.  Scope was then slowly retracted back.  No polyps, masses or

ulcerations in the remainder of the cecum, ascending, transverse, descending and

sigmoid colon.  Once in the rectum, the scope was retroflexed noting no other

pathology.  Scope was returned to its normal position, slowly withdrawn until

completely removed.  The patient tolerated procedure well without any

complications and taken to the recovery room in stable condition.



RECOMMENDATIONS:

The patient will follow up on pathology.  We will stop his omeprazole and start

him on Protonix 40 mg daily.  If no improvement, we will consider a CT scan of

the chest, abdomen and pelvis for further evaluation of weight loss.





Job ID: [id number]

DocumentID: [id number]

Dictated Date:  11/02/2021 15:38:28

Transcription Date: 11/02/2021 20:31:19

Dictated By: NANY BROWNLEE DO

## 2021-11-02 NOTE — DISCHARGE INST-SIMPLE/STANDARD
Discharge Inst-Standard


Patient Instructions/Follow Up


Plan of Care/Instructions/FU:  


2 weeks Kem


Activity as Tolerated:  Yes


Discharge Diet:  Regular Diet











NANY PHILIP DO               Nov 2, 2021 15:31

## 2021-11-02 NOTE — PROGRESS NOTE-PRE OPERATIVE
Pre-Operative Progress Note


H&P Reviewed


The H&P was reviewed, patient examined and no changes noted.


Date Seen by Provider:  Nov 2, 2021


Time Seen by Provider:  14:37


Date H&P Reviewed:  Nov 2, 2021


Time H&P Reviewed:  14:37


Pre-Operative Diagnosis:  wt loss, gerd, epigastric abd pain











NANY PHILIP DO               Nov 2, 2021 14:37

## 2021-12-03 ENCOUNTER — HOSPITAL ENCOUNTER (OUTPATIENT)
Dept: HOSPITAL 75 - CARD | Age: 41
End: 2021-12-03
Attending: SURGERY
Payer: MEDICARE

## 2021-12-03 DIAGNOSIS — K82.8: Primary | ICD-10-CM

## 2021-12-03 PROCEDURE — 78226 HEPATOBILIARY SYSTEM IMAGING: CPT

## 2021-12-03 PROCEDURE — 78227 HEPATOBIL SYST IMAGE W/DRUG: CPT

## 2021-12-06 NOTE — DIAGNOSTIC IMAGING REPORT
Indication: Abdominal pain



After intravenous administration of 5.4 mCi technetium 99m

Choletec, scintigraphic images of the upper abdomen are obtained.

Initial images reveal normal distribution of activity throughout

the liver. There is prompt appearance of activity in the biliary

tree and gallbladder. Activity passes freely into the small

bowel. Patient ingested 8 ounces of ensure with gallbladder

ejection fraction calculated to be 22%. Normal values are 50% or

greater.



Impression:



No scintigraphic evidence of acute cholecystitis or biliary tract

obstruction. There is depression of gallbladder ejection which

could be due to chronic cholecystitis or biliary dyskinesia.



Dictated by: 



  Dictated on workstation # [id number]

## 2021-12-22 ENCOUNTER — HOSPITAL ENCOUNTER (OUTPATIENT)
Dept: HOSPITAL 75 - PREOP | Age: 41
LOS: 5 days | Discharge: HOME | End: 2021-12-27
Attending: SURGERY
Payer: MEDICARE

## 2021-12-22 VITALS — WEIGHT: 230.38 LBS | HEIGHT: 72.01 IN | BODY MASS INDEX: 31.2 KG/M2

## 2021-12-22 DIAGNOSIS — Z01.818: Primary | ICD-10-CM

## 2021-12-30 ENCOUNTER — HOSPITAL ENCOUNTER (OUTPATIENT)
Dept: HOSPITAL 75 - SDC | Age: 41
End: 2021-12-30
Attending: SURGERY
Payer: MEDICARE

## 2021-12-30 VITALS — SYSTOLIC BLOOD PRESSURE: 119 MMHG | DIASTOLIC BLOOD PRESSURE: 75 MMHG

## 2021-12-30 VITALS — DIASTOLIC BLOOD PRESSURE: 58 MMHG | SYSTOLIC BLOOD PRESSURE: 97 MMHG

## 2021-12-30 VITALS — SYSTOLIC BLOOD PRESSURE: 119 MMHG | DIASTOLIC BLOOD PRESSURE: 72 MMHG

## 2021-12-30 VITALS — SYSTOLIC BLOOD PRESSURE: 127 MMHG | DIASTOLIC BLOOD PRESSURE: 81 MMHG

## 2021-12-30 VITALS — DIASTOLIC BLOOD PRESSURE: 80 MMHG | SYSTOLIC BLOOD PRESSURE: 114 MMHG

## 2021-12-30 VITALS — HEIGHT: 71.65 IN | WEIGHT: 230.38 LBS | BODY MASS INDEX: 31.55 KG/M2

## 2021-12-30 VITALS — SYSTOLIC BLOOD PRESSURE: 114 MMHG | DIASTOLIC BLOOD PRESSURE: 80 MMHG

## 2021-12-30 VITALS — SYSTOLIC BLOOD PRESSURE: 142 MMHG | DIASTOLIC BLOOD PRESSURE: 81 MMHG

## 2021-12-30 VITALS — DIASTOLIC BLOOD PRESSURE: 77 MMHG | SYSTOLIC BLOOD PRESSURE: 114 MMHG

## 2021-12-30 VITALS — DIASTOLIC BLOOD PRESSURE: 75 MMHG | SYSTOLIC BLOOD PRESSURE: 130 MMHG

## 2021-12-30 VITALS — DIASTOLIC BLOOD PRESSURE: 72 MMHG | SYSTOLIC BLOOD PRESSURE: 118 MMHG

## 2021-12-30 DIAGNOSIS — K42.9: Primary | ICD-10-CM

## 2021-12-30 DIAGNOSIS — K81.1: ICD-10-CM

## 2021-12-30 DIAGNOSIS — Z80.9: ICD-10-CM

## 2021-12-30 DIAGNOSIS — K82.8: ICD-10-CM

## 2021-12-30 DIAGNOSIS — Z82.49: ICD-10-CM

## 2021-12-30 DIAGNOSIS — Z79.899: ICD-10-CM

## 2021-12-30 DIAGNOSIS — Z83.3: ICD-10-CM

## 2021-12-30 DIAGNOSIS — F17.210: ICD-10-CM

## 2021-12-30 DIAGNOSIS — Z79.82: ICD-10-CM

## 2021-12-30 PROCEDURE — 87081 CULTURE SCREEN ONLY: CPT

## 2021-12-30 PROCEDURE — 76000 FLUOROSCOPY <1 HR PHYS/QHP: CPT

## 2021-12-30 RX ADMIN — SODIUM CHLORIDE, SODIUM LACTATE, POTASSIUM CHLORIDE, AND CALCIUM CHLORIDE PRN MLS/HR: 600; 310; 30; 20 INJECTION, SOLUTION INTRAVENOUS at 08:55

## 2021-12-30 RX ADMIN — SODIUM CHLORIDE, SODIUM LACTATE, POTASSIUM CHLORIDE, AND CALCIUM CHLORIDE PRN MLS/HR: 600; 310; 30; 20 INJECTION, SOLUTION INTRAVENOUS at 10:35

## 2021-12-30 NOTE — ANESTHESIA-GENERAL POST-OP
General


Patient Condition


Mental Status/LOC:  Same as Preop


Cardiovascular:  Satisfactory


Nausea/Vomiting:  Absent


Respiratory:  Satisfactory


Pain:  Controlled


Complications:  Absent





Post Op Complications


Complications


None





Follow Up Care/Instructions


Patient Instructions


None needed.





Anesthesia/Patient Condition


Patient Condition


Patient is doing well, no complaints, stable vital signs, no apparent adverse 

anesthesia problems.   


No complications reported per nursing.











NASEEM PIERRE CRNA          Dec 30, 2021 11:25

## 2021-12-30 NOTE — OPERATIVE REPORT
DATE OF SERVICE:  12/30/2021



PREOPERATIVE DIAGNOSIS:

Umbilical hernia and biliary dyskinesia.



POSTOPERATIVE DIAGNOSIS:

Umbilical hernia and biliary dyskinesia.



PROCEDURE:

Primary umbilical hernia repair.  Laparoscopic cholecystectomy with

intraoperative cholangiogram.



SURGEON:

Nany Brownlee DO



ASSISTANT:

Dr. Copeland, assisted in retraction, dissection and closure.



ANESTHESIA:

General.



ESTIMATED BLOOD LOSS:

Minimal.



COMPLICATIONS:

None.



INDICATIONS:

The patient is a 41-year-old male with umbilical hernia and with biliary

dyskinesia.  He understands risks and benefits of procedures and wishes to

proceed.  Consent was signed in the chart.



DESCRIPTION OF PROCEDURE:

The patient was taken to the operating suite, was prepped and draped in sterile

fashion.  Surgical pause was performed.  Incision was made just above the

umbilicus and cautery used to dissect down through the subcutaneous tissues. 

The umbilical stalk was dissected around and the umbilical stalk was released

from the fascia visualizing the hernia defect.  A balloon trocar was inserted

into the abdomen and pneumoperitoneum was achieved.  Under direct visualization

of laparoscope, a 5 mm trocar was placed in the subxiphoid region and two 5 mm

trocars were placed in the right upper quadrant.  Gallbladder was grasped and

adhesions were taken off of it.  The cystic duct and cystic artery were then

dissected out.  Clips were placed on the proximal and distal portion of the

cystic artery and the distal portion of the cystic duct.  The duct was then

partially transected.  The cholangiogram catheter was inserted into the cystic

duct and cholangiogram was performed.  There were no filling defects.  Contrast

made its way into the duodenum.  The catheter was then removed.  Clips were

placed on the proximal portion of the cystic duct and the duct and the artery

were then completely transected.  Hook cautery was then used to dissect the

gallbladder from the gallbladder fossa achieving hemostasis.  The gallbladder

was placed in an Endobag and removed through 12 mm trocar site.  At this time,

the umbilical hernia defect was then closed using 0 Vicryl in a pants-over-vest

fashion.  The umbilical stalk was then secured back to the fascia and the skin

was then closed using 4-0 Monocryl in a running subcuticular fashion.  The other

trocars were removed, and the skin was then closed using 4-0 Monocryl in a

subcuticular fashion.  The areas were then washed and dried and Skin Affix was

placed over the incisions.  The patient tolerated procedure well without any

complications.  He was taken to recovery room in stable condition.





CC:  Mike Shah - requested, unable to deliver





Job ID: [id number]

DocumentID: [id number]

Dictated Date:  12/30/2021 21:52:25

Transcription Date: 12/30/2021 23:51:09

Dictated By: NANY BROWNLEE DO

## 2021-12-30 NOTE — DIAGNOSTIC IMAGING REPORT
Fluoroscopy.



Indication:  Right upper quadrant pain



Fluoroscopic assistance was provided for Dr. Brownlee during his

laparoscopic cholecystectomy procedure. 24 seconds of fluoroscopy

time was utilized.



There are 2 spot films of the right upper quadrant were obtained.

There are laparoscopic devices in place. The common bile duct has

been opacified via cystic duct catheter. There is no sign of a

defect within the common bile to indicate a retained calculus.

Contrast is seen extending to the small bowel. 



Impression: Fluoroscopic assistance was provided for Dr. Brownlee.



Dictated by: 



  Dictated on workstation # [id number]

## 2021-12-30 NOTE — DISCHARGE INST-SIMPLE/STANDARD
Discharge Inst-Standard


Discharge Medications


New, Converted or Re-Newed RX:  Transmitted to Pharmacy





Patient Instructions/Follow Up


Plan of Care/Instructions/FU:  


2-3 weeks Kem


Activity as Tolerated:  No


Discharge Diet:  Regular Diet


Other Inst to Patient


Follow up Appt:


Make appointment for 2-3 weeks.





Instructions:


No lifting greater than 10 pounds.


No strenuous activity. 


May shower in 24 hours, no tub bath or soaking.


Use incentive spirometer at home as directed.


No Smoking





Skin/Wound Care:


You have special glue over incision, it will fall off on it's own.


You have bandage over umbilicus, remove it in 48 hours and remove the small ball

in the belly button.





Symptoms to Report:


Appetite Changes, Extremity Discoloration, Numbness/Tingling, Swelling 

Increased, Bleeding Excessive, Eyesight Changes, Pain Increased, Urine Color 

Change, Constipation(Persistent), Fever over 101 degree F, Pain/Pressure in 

chest, Urinating Difficulty, Cough Up/Vomit Blood, Heart Beat Irreg/Pounding, 

Pain/Pressure in jaw, Vaginal Bleeding Increase, Cramps in feet or legs, 

Lightheadedness, Pain/Pressure in shoulder, Diarrhea(Persistent), Memory Changes

Suddenly, Questions/Concerns, Weight gain consecutive days, Dizziness/Fainting, 

Nausea/Vomiting, Shortness of Breath, Weight gain over 2 pounds.





If eyes or skin turn yellow notify physician.








If questions or concerns contact your physician 


Or seek help at emergency department.











NANY PHILIP DO              Dec 30, 2021 10:57

## 2021-12-30 NOTE — PROGRESS NOTE-PRE OPERATIVE
Pre-Operative Progress Note


H&P Reviewed


The H&P was reviewed, patient examined and no changes noted.


Date Seen by Provider:  Dec 30, 2021


Time Seen by Provider:  08:58


Date H&P Reviewed:  Dec 30, 2021


Time H&P Reviewed:  08:58


Pre-Operative Diagnosis:  umbilical hernia, biliary dyskinesia











NANY PHILIP DO              Dec 30, 2021 08:59

## 2021-12-30 NOTE — PROGRESS NOTE-POST OPERATIVE
Post-Operative Progess Note


Surgeon (s)/Assistant (s)


Surgeon


NANY PHILIP DO


Assistant:  Dr. Copeland





Pre-Operative Diagnosis


umbilical hernia, biliary dyskinesia





Post-Operative Diagnosis





same





Procedure & Operative Findings


Date of Procedure


12/30/21


Procedure Performed/Findings


lap zuleyka c ioc, primary umbilical hernia repair.


Anesthesia Type


gen





Estimated Blood Loss


Estimated blood loss (mL):  minimal





Specimens/Packing


Specimens Removed


gallbladder











NANY PHILIP DO              Dec 30, 2021 10:52

## 2022-01-01 ENCOUNTER — HOSPITAL ENCOUNTER (EMERGENCY)
Dept: HOSPITAL 75 - ER | Age: 42
LOS: 1 days | Discharge: HOME | End: 2022-01-02
Payer: MEDICARE

## 2022-01-01 VITALS — BODY MASS INDEX: 31.05 KG/M2 | HEIGHT: 72.05 IN | WEIGHT: 229.28 LBS

## 2022-01-01 DIAGNOSIS — F17.210: ICD-10-CM

## 2022-01-01 DIAGNOSIS — L03.311: Primary | ICD-10-CM

## 2022-01-01 DIAGNOSIS — G89.18: ICD-10-CM

## 2022-01-01 DIAGNOSIS — Z86.73: ICD-10-CM

## 2022-01-01 LAB
APTT PPP: YELLOW S
BILIRUB UR QL STRIP: NEGATIVE
FIBRINOGEN PPP-MCNC: CLEAR MG/DL
GLUCOSE UR STRIP-MCNC: NEGATIVE MG/DL
HCT VFR BLD CALC: 48 % (ref 40–54)
HGB BLD-MCNC: 16.2 G/DL (ref 13.3–17.7)
KETONES UR QL STRIP: NEGATIVE
LEUKOCYTE ESTERASE UR QL STRIP: NEGATIVE
MCH RBC QN AUTO: 33 PG (ref 25–34)
MCHC RBC AUTO-ENTMCNC: 34 G/DL (ref 32–36)
MCV RBC AUTO: 98 FL (ref 80–99)
NITRITE UR QL STRIP: NEGATIVE
PH UR STRIP: 7 [PH] (ref 5–9)
PLATELET # BLD: 270 10^3/UL (ref 130–400)
PMV BLD AUTO: 8.6 FL (ref 9–12.2)
PROT UR QL STRIP: NEGATIVE
SP GR UR STRIP: 1.02 (ref 1.02–1.02)
WBC # BLD AUTO: 12.7 10^3/UL (ref 4.3–11)

## 2022-01-01 PROCEDURE — 85027 COMPLETE CBC AUTOMATED: CPT

## 2022-01-01 PROCEDURE — 74177 CT ABD & PELVIS W/CONTRAST: CPT

## 2022-01-01 PROCEDURE — 81000 URINALYSIS NONAUTO W/SCOPE: CPT

## 2022-01-01 PROCEDURE — 80053 COMPREHEN METABOLIC PANEL: CPT

## 2022-01-01 PROCEDURE — 86141 C-REACTIVE PROTEIN HS: CPT

## 2022-01-01 PROCEDURE — 36415 COLL VENOUS BLD VENIPUNCTURE: CPT

## 2022-01-02 VITALS — DIASTOLIC BLOOD PRESSURE: 88 MMHG | SYSTOLIC BLOOD PRESSURE: 126 MMHG

## 2022-01-02 LAB
ALBUMIN SERPL-MCNC: 3.8 GM/DL (ref 3.2–4.5)
ALP SERPL-CCNC: 94 U/L (ref 40–136)
ALT SERPL-CCNC: 25 U/L (ref 0–55)
AMORPH SED URNS QL MICRO: (no result) /LPF
BACTERIA #/AREA URNS HPF: NEGATIVE /HPF
BILIRUB SERPL-MCNC: 0.3 MG/DL (ref 0.1–1)
BUN/CREAT SERPL: 12
CALCIUM SERPL-MCNC: 8.5 MG/DL (ref 8.5–10.1)
CHLORIDE SERPL-SCNC: 104 MMOL/L (ref 98–107)
CO2 SERPL-SCNC: 21 MMOL/L (ref 21–32)
CREAT SERPL-MCNC: 0.86 MG/DL (ref 0.6–1.3)
GFR SERPLBLD BASED ON 1.73 SQ M-ARVRAT: 97 ML/MIN
GLUCOSE SERPL-MCNC: 121 MG/DL (ref 70–105)
POTASSIUM SERPL-SCNC: 3.3 MMOL/L (ref 3.6–5)
PROT SERPL-MCNC: 6.5 GM/DL (ref 6.4–8.2)
RBC #/AREA URNS HPF: (no result) /HPF
SODIUM SERPL-SCNC: 138 MMOL/L (ref 135–145)
WBC #/AREA URNS HPF: (no result) /HPF
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
## 2022-01-02 NOTE — DIAGNOSTIC IMAGING REPORT
INDICATION: Abdominal pain, recent umbilical hernia repair.



TECHNIQUE: Multiple contiguous axial images were obtained through

the abdomen and pelvis after administration of intravenous

contrast. Auto Exposure Controls were utilized during the CT exam

to meet ALARA standards for radiation dose reduction. All CT

scans use one or more of the following dose optimizing

techniques: automated exposure control, MA and/or KvP adjustment

based on patient size and exam type or iterative reconstruction.



CT abdomen and pelvis is compared to the prior study of

10/06/2021.



Visualized portions of the lung bases are clear. There is no

pleural fluid collection. There is no overt bony abnormality.



The liver shows no focal lesion. Patient has had previous

cholecystectomy. The spleen, adrenals, and pancreas are normal.

The kidneys bilaterally appear normal. There is no

retroperitoneal mass or adenopathy. There is no ascites.

Visualized bowel loops show no sign of obstruction.



There is postoperative change in the anterior abdominal wall at

the site of previous periumbilical hernia. There is some

ill-defined soft tissue gas and edema and without well-defined

pocket of fluid. There appears to be a minimal trace of free air

which can be explained by recent surgery.



IMPRESSION:



There are postoperative changes in the anterior abdominal wall at

the site of previous umbilical hernia repair. There is some edema

and ill-defined soft tissue gas but no well-defined pocket of

fluid. Minimal trace of intra-abdominal air is likely

postoperative in nature. There is no intra-abdominal abscess or

hematoma.



Dictated by: 



  Dictated on workstation # [id number]

## 2022-01-11 ENCOUNTER — HOSPITAL ENCOUNTER (EMERGENCY)
Dept: HOSPITAL 75 - ER | Age: 42
Discharge: HOME | End: 2022-01-11
Payer: MEDICARE

## 2022-01-11 VITALS — BODY MASS INDEX: 31.26 KG/M2 | WEIGHT: 230.82 LBS | HEIGHT: 71.97 IN

## 2022-01-11 VITALS — SYSTOLIC BLOOD PRESSURE: 136 MMHG | DIASTOLIC BLOOD PRESSURE: 100 MMHG

## 2022-01-11 DIAGNOSIS — Z79.891: ICD-10-CM

## 2022-01-11 DIAGNOSIS — Z86.73: ICD-10-CM

## 2022-01-11 DIAGNOSIS — G89.29: ICD-10-CM

## 2022-01-11 DIAGNOSIS — G89.18: Primary | ICD-10-CM

## 2022-01-11 DIAGNOSIS — M54.9: ICD-10-CM

## 2022-01-11 DIAGNOSIS — R10.9: ICD-10-CM

## 2022-01-11 LAB
ALBUMIN SERPL-MCNC: 4 GM/DL (ref 3.2–4.5)
ALP SERPL-CCNC: 80 U/L (ref 40–136)
ALT SERPL-CCNC: 33 U/L (ref 0–55)
BASOPHILS # BLD AUTO: 0.1 10^3/UL (ref 0–0.1)
BASOPHILS NFR BLD AUTO: 1 % (ref 0–10)
BILIRUB SERPL-MCNC: 0.3 MG/DL (ref 0.1–1)
BUN/CREAT SERPL: 8
CALCIUM SERPL-MCNC: 8.6 MG/DL (ref 8.5–10.1)
CHLORIDE SERPL-SCNC: 109 MMOL/L (ref 98–107)
CO2 SERPL-SCNC: 24 MMOL/L (ref 21–32)
CREAT SERPL-MCNC: 0.86 MG/DL (ref 0.6–1.3)
EOSINOPHIL # BLD AUTO: 0.3 10^3/UL (ref 0–0.3)
EOSINOPHIL NFR BLD AUTO: 5 % (ref 0–10)
GFR SERPLBLD BASED ON 1.73 SQ M-ARVRAT: 97 ML/MIN
GLUCOSE SERPL-MCNC: 97 MG/DL (ref 70–105)
HCT VFR BLD CALC: 46 % (ref 40–54)
HGB BLD-MCNC: 15.5 G/DL (ref 13.3–17.7)
LYMPHOCYTES # BLD AUTO: 1.7 10^3/UL (ref 1–4)
LYMPHOCYTES NFR BLD AUTO: 23 % (ref 12–44)
MANUAL DIFFERENTIAL PERFORMED BLD QL: NO
MCH RBC QN AUTO: 33 PG (ref 25–34)
MCHC RBC AUTO-ENTMCNC: 34 G/DL (ref 32–36)
MCV RBC AUTO: 99 FL (ref 80–99)
MONOCYTES # BLD AUTO: 0.6 10^3/UL (ref 0–1)
MONOCYTES NFR BLD AUTO: 8 % (ref 0–12)
NEUTROPHILS # BLD AUTO: 4.7 10^3/UL (ref 1.8–7.8)
NEUTROPHILS NFR BLD AUTO: 63 % (ref 42–75)
PLATELET # BLD: 310 10^3/UL (ref 130–400)
PMV BLD AUTO: 8.4 FL (ref 9–12.2)
POTASSIUM SERPL-SCNC: 3.6 MMOL/L (ref 3.6–5)
PROT SERPL-MCNC: 7 GM/DL (ref 6.4–8.2)
SODIUM SERPL-SCNC: 143 MMOL/L (ref 135–145)
WBC # BLD AUTO: 7.5 10^3/UL (ref 4.3–11)

## 2022-01-11 PROCEDURE — 36415 COLL VENOUS BLD VENIPUNCTURE: CPT

## 2022-01-11 PROCEDURE — 74176 CT ABD & PELVIS W/O CONTRAST: CPT

## 2022-01-11 PROCEDURE — 96372 THER/PROPH/DIAG INJ SC/IM: CPT

## 2022-01-11 PROCEDURE — 85025 COMPLETE CBC W/AUTO DIFF WBC: CPT

## 2022-01-11 PROCEDURE — 80053 COMPREHEN METABOLIC PANEL: CPT

## 2022-01-11 NOTE — ED INTEGUMENTARY GENERAL
General


Chief Complaint:  Skin/Wound Problems


Stated Complaint:  ABD PAIN


Nursing Triage Note:  


HERE TODAY BECAUSE PAIN IS INCREASING AND HAS HAD PUSS LIKE DRAINAGE FROM 


ABDOMINAL INCISION FROM PREVIOUS HERNIA REPAIR. STATES HAS NEEDED TO CHANGE 


DRESSING MULTIPLE TIMES PER DAY LAST 2 DAYS. THIS RN IS UNABLE TO OBTAIN A 


SPECIMINE FOR CULTURE D/T NO DRAINAGE AT THIS TIME. PERVIOUS MARKING ON SKIN 


WHERE REDNESS WAS PRESNT ARE STILL VISIABLE ADN NO REDNESS IS NOTED AT THIS 


TIME. PATIENT STATES HE IS TO SEE DR PHILIP TOMORROW AND IS NOW OUT OF PAIN 


MEDICATION.


Source:  patient


Exam Limitations:  no limitations





History of Present Illness


Date Seen by Provider:  2022


Time Seen by Provider:  14:39


Initial Comments


To ER with reports of abdominal pain that began last night.  He had a 

laparoscopic umbilical hernia repair and cholecystectomy on 2021.  He 

reports a little drainage from the supraumbilical incision.  No redness no fever

no chills.  His pain also started coincidentally when he ran out of his 

hydrocodone last night.


Timing/Duration:  constant


Severity:  moderate


Possible Cause:  no cause identified


Associated Symptoms:  denies symptoms





Allergies and Home Medications


Allergies


Coded Allergies:  


     Sulfa (Sulfonamide Antibiotics) (Verified  Allergy, Severe, HIVES, 

21)


     levofloxacin (Verified  Allergy, Unknown, 21)


     doxycycline (Verified  Adverse Reaction, Mild, Vomiting, 21)





Patient Home Medication List


Home Medication List Reviewed:  Yes


Cefdinir (Cefdinir) 300 Mg Capsule, 300 MG PO BID


   Prescribed by: SOHAM JAMISON on 22 0236


Clindamycin HCl (Clindamycin HCl) 300 Mg Capsule, 300 MG PO QID


   Prescribed by: SOHAM JAMISON on 22 0236


Docusate Sodium (Colace) 100 Mg Capsule, 100 MG PO BID


   Prescribed by: NANY PHILIP on 21 1052


Hydrocodone/Acetaminophen (Hydrocodone-Acetamin 5-325 mg) 1 Each Tablet, 1 EACH 

PO Q4H PRN for PAIN-MODERATE (5-7)


   Prescribed by: NANY PHILIP on 21 1054


Hydrocodone/Acetaminophen (Hydrocodone-Acetamin 5-325 mg) 1 Each Tablet, 1 TAB 

PO Q4H PRN for PAIN-MODERATE (5-7)


   Prescribed by: BRYAN AGUILAR on 22 1539





Review of Systems


Review of Systems


Constitutional:  see HPI


EENTM:  see HPI


Respiratory:  no symptoms reported


Cardiovascular:  no symptoms reported


Genitourinary:  no symptoms reported


Musculoskeletal:  no symptoms reported


Skin:  no symptoms reported


Psychiatric/Neurological:  No Symptoms Reported


Endocrine:  No Symptoms Reported


Hematologic/Lymphatic:  No Symptoms Reported





Past Medical-Social-Family Hx


Immunizations Up To Date


First/Initial COVID19 Vaccinat:  3/21


Second COVID19 Vaccination Bo:  





Seasonal Allergies


Seasonal Allergies:  No





Past Medical History


Surgery/Hospitalization HX:  


CHOLECYSTECTOMY, UMBILICAL HERNIA REPAIR, RENAL STONES, APPY,VASECTOMY, T/A, CVA




2019, ANXIETY, PTSD


Surgeries:  Yes (KIDNEY STONES--BASKET REMOVAL, )


Abdominal, Adenoidectomy, Appendectomy, Gallbladder, Tonsillectomy, Vasectomy


Respiratory:  No


Currently Using CPAP:  No


Currently Using BIPAP:  No


Cardiac:  No


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


Stroke


Reproductive Disorders:  No


Genitourinary:  Yes


Kidney Stones


Gastrointestinal:  Yes (CHRONIC GI COMPLAINTS)


Abdominal Hernia, Gall Bladder Disease


Musculoskeletal:  Yes


Chronic Back Pain


Endocrine:  Yes (hypoglycemia)


HEENT:  No


Tonsilitis


Cancer:  No


Psychosocial:  Yes


Anxiety, PTSD


Integumentary:  No


Blood Disorders:  No





Family Medical History





SOCIAL HISTORY:


-ETOH--OCCASIONAL USE


-DRUGS--DENIES USE, BUT UDS HAS BEEN + FOR THC


-SMOKES 1 PPD





PAST SURGICAL HISTORY:


-KIDNEY STONES-BASKET REMOVAL


-TONSILLECTOMY/ADENOIDECTOMY


-VASECTOMY


-CARDIAC CATH 12/15/20 BY :


CONCLUSIONS:


1.  No angiographically significant coronary artery disease.


2.  Normal left ventricular end-diastolic pressure.


3.  Normal left ventricular systolic function with ejection fraction of


55% to


60%.


DISCUSSION AND RECOMMENDATIONS:


Based on results of the study, chest discomfort does not appear to be of


coronary origin.  Risk factor modification is advised.  Have advised to


refrain


from tobacco use.  Other risk factor modification has also been discussed. 


Outpatient followup is advised.





Physical Exam


Vital Signs





Vital Signs - First Documented








 22





 14:26


 


Temp 36.1


 


Pulse 67


 


Resp 18


 


B/P (MAP) 152/103 (119)


 


Pulse Ox 98





Capillary Refill : Less Than 3 Seconds


General Appearance:  WD/WN, no apparent distress


HEENT:  PERRL/EOMI, normal ENT inspection


Neck:  non-tender, full range of motion


Respiratory:  no respiratory distress, no accessory muscle use


Gastrointestinal:  normal bowel sounds, soft


Neurologic/Psychiatric:  alert, normal mood/affect, oriented x 3


Skin:  normal color, warm/dry


Skin Problem Location:  generalized


Skin Problem Character:  other (Incision is clean dry and intact without 

drainage or cellulitis)





Progress/Results/Core Measures


Results/Orders


Lab Results





Laboratory Tests








Test


 22


15:11 Range/Units


 


 


White Blood Count


 7.5 


 4.3-11.0


10^3/uL


 


Red Blood Count


 4.65 


 4.30-5.52


10^6/uL


 


Hemoglobin 15.5  13.3-17.7  g/dL


 


Hematocrit 46  40-54  %


 


Mean Corpuscular Volume 99  80-99  fL


 


Mean Corpuscular Hemoglobin 33  25-34  pg


 


Mean Corpuscular Hemoglobin


Concent 34 


 32-36  g/dL





 


Red Cell Distribution Width 12.9  10.0-14.5  %


 


Platelet Count


 310 


 130-400


10^3/uL


 


Mean Platelet Volume 8.4 L 9.0-12.2  fL


 


Immature Granulocyte % (Auto) 0   %


 


Neutrophils (%) (Auto) 63  42-75  %


 


Lymphocytes (%) (Auto) 23  12-44  %


 


Monocytes (%) (Auto) 8  0-12  %


 


Eosinophils (%) (Auto) 5  0-10  %


 


Basophils (%) (Auto) 1  0-10  %


 


Neutrophils # (Auto)


 4.7 


 1.8-7.8


10^3/uL


 


Lymphocytes # (Auto)


 1.7 


 1.0-4.0


10^3/uL


 


Monocytes # (Auto)


 0.6 


 0.0-1.0


10^3/uL


 


Eosinophils # (Auto)


 0.3 


 0.0-0.3


10^3/uL


 


Basophils # (Auto)


 0.1 


 0.0-0.1


10^3/uL


 


Immature Granulocyte # (Auto)


 0.0 


 0.0-0.1


10^3/uL


 


Sodium Level 143  135-145  MMOL/L


 


Potassium Level 3.6  3.6-5.0  MMOL/L


 


Chloride Level 109 H   MMOL/L


 


Carbon Dioxide Level 24  21-32  MMOL/L


 


Anion Gap 10  5-14  MMOL/L


 


Blood Urea Nitrogen 7  7-18  MG/DL


 


Creatinine


 0.86 


 0.60-1.30


MG/DL


 


Estimat Glomerular Filtration


Rate 97 


  





 


BUN/Creatinine Ratio 8   


 


Glucose Level 97    MG/DL


 


Calcium Level 8.6  8.5-10.1  MG/DL


 


Corrected Calcium 8.6  8.5-10.1  MG/DL


 


Total Bilirubin 0.3  0.1-1.0  MG/DL


 


Aspartate Amino Transf


(AST/SGOT) 26 


 5-34  U/L





 


Alanine Aminotransferase


(ALT/SGPT) 33 


 0-55  U/L





 


Alkaline Phosphatase 80    U/L


 


Total Protein 7.0  6.4-8.2  GM/DL


 


Albumin 4.0  3.2-4.5  GM/DL








My Orders





Orders - BRYAN AGUILAR


Cbc With Automated Diff (22 14:38)


Comprehensive Metabolic Panel (22 14:38)


Ct Abdomen/Pelvis Wo (22 14:38)


Hydrocodone/Apap 5/325 Tablet (Lortab 5 (22 14:45)


Ketorolac Injection (Toradol Injection) (22 16:00)





Medications Given in ED





Current Medications








 Medications  Dose


 Ordered  Sig/Duy


 Route  Start Time


 Stop Time Status Last Admin


Dose Admin


 


 Acetaminophen/


 Hydrocodone Bitart  1 ea  ONCE  ONCE


 PO  22 14:45


 22 14:46 DC 22 15:09


1 EA


 


 Ketorolac


 Tromethamine  60 mg  ONCE  ONCE


 IM  22 16:00


 22 16:01  22 15:55


60 MG








Vital Signs/I&O











 22





 14:26


 


Temp 36.1


 


Pulse 67


 


Resp 18


 


B/P (MAP) 152/103 (119)


 


Pulse Ox 98














Blood Pressure Mean:                    119











Departure


Communication (Admissions)


Family Conversation


In regards to the CT findings, he is absent any fevers or chills, any 

leukocytosis or any erythema overlying the incision therefore this is not the 

appropriate setting to worry about abscess.


NAME:   DALLASTABATHA W 


MED REC#:   [id number]


ACCOUNT#:   [id number]


PT STATUS:   REG ER


:   1980


PHYSICIAN:   BRYAN AGUILAR


ADMIT DATE:   22/ER


                                  ***Signed***


Date of Exam:22





CT ABDOMEN/PELVIS WO








EXAMINATION: 


CT abdomen and pelvis without contrast.





TECHNIQUE: 


Multiple contiguous axial images were obtained through the


abdomen and pelvis without the use of intravenous contrast. All


CT scans use one or more of the following dose optimizing


techniques: automated exposure control, MA and/or KvP adjustment


based on patient size and exam type or iterative reconstruction. 





HISTORY: 


Abdominal pain.





COMPARISON: 


2022.





FINDINGS: 





Lung bases: 


Bibasilar dependent atelectasis.





Solid organs: 


There is decreased attenuation of the liver which can be seen


with hepatic steatosis. The gallbladder is surgically absent.


There is no biliary ductal dilation. Pancreas is normal.  Spleen


is normal. Adrenal glands are normal. The kidneys are normal


without visualized calculus or hydronephrosis.





Bowel: 


The stomach and small bowel are normal without obstruction. The


colon and appendix are normal. 





Peritoneum: 


There is no intraperitoneal free fluid or free air. No suspicious


lymphadenopathy. 





Vasculature: 


Calcification of the aorta without aneurysm.





Musculoskeletal: 


Degenerative changes of the spine without suspicious osseous


lesion or compression fracture. There is fluid and stranding seen


within the periumbilical soft tissues, likely related to recent


umbilical hernia repair.





Pelvis: 


The prostate gland is normal. The urinary bladder is normal.





IMPRESSION:


1. Fluid and stranding around the periumbilical soft tissues


which is likely related to recent umbilical hernia repair.


Sterility of this fluid cannot be determined although an


infectious process would be within the differential in the


appropriate clinical setting.





2. Hepatic steatosis.





Dictated by: 





  Dictated on workstation # [id number]








Dict:   22 1508


Trans:   22 1540


 [id number]





Interpreted by:     MICHAEL CALLOWAY DO


Electronically signed by: MICHAEL CALLOWAY DO 22 1540





Impression





   Primary Impression:  


   Postoperative abdominal pain


Disposition:   HOME, SELF-CARE


Condition:  Stable





Departure-Patient Inst.


Decision time for Depature:  14:41


Referrals:  


[facility] (PCP)


Primary Care Physician








HENOK JULIAN (Family)


Primary Care Physician


Patient Instructions:  Postoperative Pain (DC)





Add. Discharge Instructions:  


.  Call your surgeon to make an appointment to be seen for follow-up later this 

week.  Return to ER for any fevers.





All discharge instructions reviewed with patient and/or family. Voiced 

understanding.


Scripts


Hydrocodone/Acetaminophen (Hydrocodone-Acetamin 5-325 mg) 1 Each Tablet


1 TAB PO Q4H PRN for PAIN-MODERATE (5-7), #10 TAB


   .


   Prov: BRYAN AGUILAR         22











BRYAN AGUILAR             2022 14:42

## 2022-01-11 NOTE — DIAGNOSTIC IMAGING REPORT
EXAMINATION: 

CT abdomen and pelvis without contrast.



TECHNIQUE: 

Multiple contiguous axial images were obtained through the

abdomen and pelvis without the use of intravenous contrast. All

CT scans use one or more of the following dose optimizing

techniques: automated exposure control, MA and/or KvP adjustment

based on patient size and exam type or iterative reconstruction. 



HISTORY: 

Abdominal pain.



COMPARISON: 

01/02/2022.



FINDINGS: 



Lung bases: 

Bibasilar dependent atelectasis.



Solid organs: 

There is decreased attenuation of the liver which can be seen

with hepatic steatosis. The gallbladder is surgically absent.

There is no biliary ductal dilation. Pancreas is normal.  Spleen

is normal. Adrenal glands are normal. The kidneys are normal

without visualized calculus or hydronephrosis.



Bowel: 

The stomach and small bowel are normal without obstruction. The

colon and appendix are normal. 



Peritoneum: 

There is no intraperitoneal free fluid or free air. No suspicious

lymphadenopathy. 



Vasculature: 

Calcification of the aorta without aneurysm.



Musculoskeletal: 

Degenerative changes of the spine without suspicious osseous

lesion or compression fracture. There is fluid and stranding seen

within the periumbilical soft tissues, likely related to recent

umbilical hernia repair.



Pelvis: 

The prostate gland is normal. The urinary bladder is normal.



IMPRESSION:

1. Fluid and stranding around the periumbilical soft tissues

which is likely related to recent umbilical hernia repair.

Sterility of this fluid cannot be determined although an

infectious process would be within the differential in the

appropriate clinical setting.



2. Hepatic steatosis.



Dictated by: 



  Dictated on workstation # [id number]

## 2022-01-22 ENCOUNTER — HOSPITAL ENCOUNTER (EMERGENCY)
Dept: HOSPITAL 75 - ER | Age: 42
LOS: 1 days | Discharge: HOME | End: 2022-01-23
Payer: MEDICARE

## 2022-01-22 VITALS — HEIGHT: 72.05 IN | WEIGHT: 231.49 LBS | BODY MASS INDEX: 31.35 KG/M2

## 2022-01-22 DIAGNOSIS — Z79.891: ICD-10-CM

## 2022-01-22 DIAGNOSIS — R51.9: Primary | ICD-10-CM

## 2022-01-22 DIAGNOSIS — R20.2: ICD-10-CM

## 2022-01-22 DIAGNOSIS — G89.29: ICD-10-CM

## 2022-01-22 DIAGNOSIS — F17.210: ICD-10-CM

## 2022-01-22 DIAGNOSIS — Z86.73: ICD-10-CM

## 2022-01-22 DIAGNOSIS — M54.9: ICD-10-CM

## 2022-01-22 LAB
ALBUMIN SERPL-MCNC: 4.4 GM/DL (ref 3.2–4.5)
ALP SERPL-CCNC: 97 U/L (ref 40–136)
ALT SERPL-CCNC: 34 U/L (ref 0–55)
APTT BLD: 31 SEC (ref 24–35)
APTT PPP: YELLOW S
BACTERIA #/AREA URNS HPF: (no result) /HPF
BARBITURATES UR QL: NEGATIVE
BASOPHILS # BLD AUTO: 0.1 10^3/UL (ref 0–0.1)
BASOPHILS NFR BLD AUTO: 1 % (ref 0–10)
BENZODIAZ UR QL SCN: NEGATIVE
BILIRUB SERPL-MCNC: 0.6 MG/DL (ref 0.1–1)
BILIRUB UR QL STRIP: NEGATIVE
BUN/CREAT SERPL: 5
CALCIUM SERPL-MCNC: 9 MG/DL (ref 8.5–10.1)
CHLORIDE SERPL-SCNC: 103 MMOL/L (ref 98–107)
CO2 SERPL-SCNC: 22 MMOL/L (ref 21–32)
COCAINE UR QL: NEGATIVE
CREAT SERPL-MCNC: 0.99 MG/DL (ref 0.6–1.3)
D DIMER PPP FEU-MCNC: 0.66 UG/ML (ref 0–0.49)
EOSINOPHIL # BLD AUTO: 0.4 10^3/UL (ref 0–0.3)
EOSINOPHIL NFR BLD AUTO: 6 % (ref 0–10)
FIBRINOGEN PPP-MCNC: CLEAR MG/DL
GFR SERPLBLD BASED ON 1.73 SQ M-ARVRAT: 97 ML/MIN
GLUCOSE SERPL-MCNC: 105 MG/DL (ref 70–105)
GLUCOSE UR STRIP-MCNC: NEGATIVE MG/DL
HCT VFR BLD CALC: 45 % (ref 40–54)
HGB BLD-MCNC: 15.3 G/DL (ref 13.3–17.7)
INR PPP: 1 (ref 0.8–1.4)
KETONES UR QL STRIP: NEGATIVE
LEUKOCYTE ESTERASE UR QL STRIP: NEGATIVE
LYMPHOCYTES # BLD AUTO: 1.9 10^3/UL (ref 1–4)
LYMPHOCYTES NFR BLD AUTO: 25 % (ref 12–44)
MANUAL DIFFERENTIAL PERFORMED BLD QL: NO
MCH RBC QN AUTO: 33 PG (ref 25–34)
MCHC RBC AUTO-ENTMCNC: 34 G/DL (ref 32–36)
MCV RBC AUTO: 96 FL (ref 80–99)
METHADONE UR QL SCN: NEGATIVE
METHAMPHETAMINE SCREEN URINE S: NEGATIVE
MONOCYTES # BLD AUTO: 0.6 10^3/UL (ref 0–1)
MONOCYTES NFR BLD AUTO: 8 % (ref 0–12)
NEUTROPHILS # BLD AUTO: 4.7 10^3/UL (ref 1.8–7.8)
NEUTROPHILS NFR BLD AUTO: 61 % (ref 42–75)
NITRITE UR QL STRIP: NEGATIVE
OPIATES UR QL SCN: POSITIVE
OXYCODONE UR QL: NEGATIVE
PH UR STRIP: 6 [PH] (ref 5–9)
PLATELET # BLD: 287 10^3/UL (ref 130–400)
PMV BLD AUTO: 8.7 FL (ref 9–12.2)
POTASSIUM SERPL-SCNC: 3.3 MMOL/L (ref 3.6–5)
PROPOXYPH UR QL: NEGATIVE
PROT SERPL-MCNC: 7.5 GM/DL (ref 6.4–8.2)
PROT UR QL STRIP: NEGATIVE
PROTHROMBIN TIME: 13.5 SEC (ref 12.2–14.7)
RBC #/AREA URNS HPF: (no result) /HPF
SODIUM SERPL-SCNC: 138 MMOL/L (ref 135–145)
SP GR UR STRIP: 1.01 (ref 1.02–1.02)
SQUAMOUS #/AREA URNS HPF: (no result) /HPF
TRICYCLICS UR QL SCN: NEGATIVE
WBC # BLD AUTO: 7.8 10^3/UL (ref 4.3–11)
WBC #/AREA URNS HPF: (no result) /HPF

## 2022-01-22 PROCEDURE — 81000 URINALYSIS NONAUTO W/SCOPE: CPT

## 2022-01-22 PROCEDURE — 93041 RHYTHM ECG TRACING: CPT

## 2022-01-22 PROCEDURE — 85730 THROMBOPLASTIN TIME PARTIAL: CPT

## 2022-01-22 PROCEDURE — 99284 EMERGENCY DEPT VISIT MOD MDM: CPT

## 2022-01-22 PROCEDURE — 70450 CT HEAD/BRAIN W/O DYE: CPT

## 2022-01-22 PROCEDURE — 80306 DRUG TEST PRSMV INSTRMNT: CPT

## 2022-01-22 PROCEDURE — 85025 COMPLETE CBC W/AUTO DIFF WBC: CPT

## 2022-01-22 PROCEDURE — 80320 DRUG SCREEN QUANTALCOHOLS: CPT

## 2022-01-22 PROCEDURE — 80053 COMPREHEN METABOLIC PANEL: CPT

## 2022-01-22 PROCEDURE — 85610 PROTHROMBIN TIME: CPT

## 2022-01-22 PROCEDURE — 70496 CT ANGIOGRAPHY HEAD: CPT

## 2022-01-22 PROCEDURE — 70498 CT ANGIOGRAPHY NECK: CPT

## 2022-01-22 PROCEDURE — 71045 X-RAY EXAM CHEST 1 VIEW: CPT

## 2022-01-22 PROCEDURE — 36415 COLL VENOUS BLD VENIPUNCTURE: CPT

## 2022-01-22 PROCEDURE — 85379 FIBRIN DEGRADATION QUANT: CPT

## 2022-01-22 PROCEDURE — 93005 ELECTROCARDIOGRAM TRACING: CPT

## 2022-01-22 PROCEDURE — 84484 ASSAY OF TROPONIN QUANT: CPT

## 2022-01-22 NOTE — DIAGNOSTIC IMAGING REPORT
INDICATION: Strokelike symptoms. 



FINDINGS: The lungs are clear. No failure, effusion or

pneumothorax. 



IMPRESSION: Normal frontal chest. 



Dictated by: 



  Dictated on workstation # [id number]

## 2022-01-22 NOTE — DIAGNOSTIC IMAGING REPORT
PROCEDURE: CT head w/o r/o stroke.



TECHNIQUE: Multiple contiguous axial images were obtained through

the brain without the use of intravenous contrast. Auto Exposure

Controls were utilized during the CT exam to meet ALARA standards

for radiation dose reduction. 



INDICATION: Left-sided numbness and weakness. 



COMPARISON: 10/20/2021.



FINDINGS: There is no intracranial hemorrhage. Prominence of the

extra-axial bifrontal CSF spaces, chronic. No acute or suspicious

extra-axial fluid collection. There is no loss of the gray-white

matter differentiations. No focal or generalized cerebral edema.

No suspicious intraluminal arterial hyperdensities. The basilar

cisterns patent. No sulcal effacement. No evidence for elevated

pressures. Orbits, sinuses and calvarium nonacute. There has been

no change.



IMPRESSION: Stable head CT. No hemorrhage, edema or acute

appearing abnormality.



Results phoned to the Emergency Room. 



Dictated by: 



  Dictated on workstation # [id number]

## 2022-01-23 VITALS — SYSTOLIC BLOOD PRESSURE: 114 MMHG | DIASTOLIC BLOOD PRESSURE: 77 MMHG

## 2022-01-23 NOTE — DIAGNOSTIC IMAGING REPORT
PROCEDURE: CT angiography of the head and CT angiography of the

neck with and without contrast.



TECHNIQUE: Contiguous noncontrast images were obtained from the

skull base through the vertex. After intravenous contrast

administration, helical CT angiography of the neck was performed.

Source data was reformatted into 3D MIP projections. Delayed post

contrast acquisition was also obtained. Auto Exposure Controls

were utilized during the CT exam to meet ALARA standards for

radiation dose reduction. 



INDICATION: Stroke. 



COMPARISON: CT head without contrast 01/22/2022.



FINDINGS: Postcontrast imaging of the head continues to

demonstrate no evidence of a large territorial infarction or

hemorrhage. No extra-axial fluid collections are seen. No

hydrocephalus. No abnormal intracranial enhancement.



CTA demonstrates a left common carotid originating from the

innominate. The basilar, bilateral vertebral, common carotid,

internal carotid, anterior cerebral, middle cerebral and

posterior cerebral arteries are widely patent without evidence of

aneurysm or dissection. Dural venous sinuses are normally

opacified.



No fractures. Paranasal sinuses and mastoids are clear.

Paravertebral soft tissues are unremarkable. Lung apices are

clear.



IMPRESSION: No large vessel occlusion. No high-grade narrowing,

aneurysm or dissection involving the major arteries in the head

and neck.



Agree with preliminary interpretation.



Dictated by: 



  Dictated on workstation # [id number]

## 2022-01-23 NOTE — ED NEUROLOGICAL PROBLEM
General


Chief Complaint:  Neuro-Stroke Like Symptoms


Stated Complaint:  L SIDE NUMBNESS,DIZZY,TROUBLE SPEAKING


Nursing Triage Note:  


TO E.TAMIA FROM WORK, PT REPORTS LEFT ARM TINGLING, DIFFICULTY SPEAKING X2 HRS.


Source:  patient, old records





History of Present Illness


Date Seen by Provider:  Jan 22, 2022


Time Seen by Provider:  21:30


Initial Comments


PT ARRIVES VIA POV FROM WORK


STATES ABOUT 2 HOURS AGO, HE BEGAN HAVING LEFT ARM/HAND TINGLING AND DIFFICULTY 

TALKING FOR THE LAST 2 HOURS


ALSO C/O HEADACHE IN LEFT TEMPLE AND SOME TINGLING IN LEFT SIDE OF FACE. 


HAS NOT TAKEN ANYTHING FOR HEADACHE


NO VISION CHANGES


DENIES  DIZZINESS


NO MOTOR DEFICITS


NO PROBLEMS WALKING


NO NAUSEA/VOMITING


NO NECK PAIN OR STIFFNESS


NO FEVER OR RECENT ILLNESS





PT HAD LAP DOTTY AND HERNIA REPAIR 12/30/21 BY DR. PHILIP


PT WAS HERE 01/02/22 AND AGAIN 01/12/22 FOR POST OP PAIN. 


PT JUST RELEASED TO GO BACK TO WORK THIS WEEK. 


NO ABDOMINAL OR GI COMPLAINTS. 





PT HAD A STROKE IN 2019 WITH LEFT SIDE WEAKNESS, WHICH HAD PREVIOUSLY RESOLVED. 


PT IS NOT ON ANY ASPIRIN OR BLOOD THINNERS OR ANY CARDIAC OR NEURO MEDICATIONS


PT DOES NOT SEE A NEUROLOGIST. 





PT WITH A MULTITUDE OF VISITS FOR VARIOUS COMPLAINTS. MANY FOR GI COMPLAINTS 

AND/OR CHEST PAIN COMPLAINTS, ALL CARDIAC WORK UP'S HAVE BEEN NON-DIAGNOSTIC, 

INCLUDING A NORMAL CARDIAC CATH 12/15/20


PT DOES NOT SEE A CARDIOLOGIST.





PCP;[facility], NP ELIEL





Allergies and Home Medications


Allergies


Coded Allergies:  


     Sulfa (Sulfonamide Antibiotics) (Verified  Allergy, Severe, HIVES, 

12/30/21)


     levofloxacin (Verified  Allergy, Unknown, 12/30/21)


     doxycycline (Verified  Adverse Reaction, Mild, Vomiting, 12/30/21)





Patient Home Medication List


Home Medication List Reviewed:  Yes


Cefdinir (Cefdinir) 300 Mg Capsule, 300 MG PO BID


   Prescribed by: SOHAM JAMISON on 1/2/22 0236


Clindamycin HCl (Clindamycin HCl) 300 Mg Capsule, 300 MG PO QID


   Prescribed by: SOHAM JAMISON on 1/2/22 0236


Docusate Sodium (Colace) 100 Mg Capsule, 100 MG PO BID


   Prescribed by: NANY PHILIP on 12/30/21 1052


Hydrocodone/Acetaminophen (Hydrocodone-Acetamin 5-325 mg) 1 Each Tablet, 1 EACH 

PO Q4H PRN for PAIN-MODERATE (5-7)


   Prescribed by: NANY PHILIP on 12/30/21 1054


Hydrocodone/Acetaminophen (Hydrocodone-Acetamin 5-325 mg) 1 Each Tablet, 1 TAB 

PO Q4H PRN for PAIN-MODERATE (5-7)


   Prescribed by: BRYAN AGUILAR on 1/11/22 1539





Review of Systems


Review of Systems


Constitutional:  no symptoms reported


Eyes:  No Symptoms Reported


Ears, Nose, Mouth, Throat:  no symptoms reported


Respiratory:  no symptoms reported; No cough, No short of breath


Cardiovascular:  no symptoms reported; No chest pain, No edema, No palpitations,

No syncope


Gastrointestinal:  no symptoms reported


Genitourinary:  no symptoms reported


Musculoskeletal:  no symptoms reported


Skin:  no symptoms reported


Psychiatric/Neurological:  See HPI; Denies Cognitive Dysfunction; Headache, 

Tingling; Denies Weakness


Endocrine:  No Symptoms Reported


Hematologic/Lymphatic:  No Symptoms Reported





Past Medical-Social-Family Hx


Patient Social History


Tobacco Use?:  Yes


Tobacco type used:  Cigarettes


Smoking Status:  Current Everyday Smoker


Substance use?:  Yes


Alcohol Use?:  Yes


Pt feels they are or have been:  No





Immunizations Up To Date


First/Initial COVID19 Vaccinat:  3/21


Second COVID19 Vaccination Bo:  4/21


COVID19 Vaccine :  [employer]





Seasonal Allergies


Seasonal Allergies:  No





Past Medical History


Surgery/Hospitalization HX:  


CHOLECYSTECTOMY, UMBILICAL HERNIA REPAIR, RENAL STONES, APPY,VASECTOMY, T/A, CVA




2019, ANXIETY, PTSD


Surgeries:  Yes (KIDNEY STONES--BASKET REMOVAL, )


Abdominal, Adenoidectomy, Appendectomy, Gallbladder, Tonsillectomy, Vasectomy


Respiratory:  No


Currently Using CPAP:  No


Currently Using BIPAP:  No


Cardiac:  No


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


Stroke


Reproductive Disorders:  No


Genitourinary:  Yes


Kidney Stones


Gastrointestinal:  Yes (CHRONIC GI COMPLAINTS)


Abdominal Hernia, Gall Bladder Disease


Musculoskeletal:  Yes


Chronic Back Pain


Endocrine:  Yes (hypoglycemia)


HEENT:  No


Tonsilitis


Cancer:  No


Psychosocial:  Yes


Anxiety, PTSD


Integumentary:  No


Blood Disorders:  No





Family Medical History





SOCIAL HISTORY:


-ETOH--OCCASIONAL USE


-DRUGS--DENIES USE, BUT UDS HAS BEEN + FOR THC


-SMOKES 1 PPD





PAST SURGICAL HISTORY:


-KIDNEY STONES-BASKET REMOVAL


-TONSILLECTOMY/ADENOIDECTOMY


-VASECTOMY


-CARDIAC CATH 12/15/20 BY :


CONCLUSIONS:


1.  No angiographically significant coronary artery disease.


2.  Normal left ventricular end-diastolic pressure.


3.  Normal left ventricular systolic function with ejection fraction of


55% to


60%.


DISCUSSION AND RECOMMENDATIONS:


Based on results of the study, chest discomfort does not appear to be of


coronary origin.  Risk factor modification is advised.  Have advised to


refrain


from tobacco use.  Other risk factor modification has also been discussed. 


Outpatient followup is advised.





-LAPAROSCOPIC CHOLECYSTECTOMY AND UMBILICAL HERNIA REPAIR 12/30/21 BY DR. PHILIP.





Physical Exam


Vital Signs





Vital Signs - First Documented








 1/22/22





 21:20


 


Temp 36.9


 


Pulse 86


 


Resp 14


 


B/P (MAP) 125/98 (107)


 


Pulse Ox 96


 


O2 Delivery Room Air





Capillary Refill : Less Than 3 Seconds


Height, Weight, BMI


Height: 6'"


Weight: 242lbs. oz. 109.824073ct; 31.00 BMI


Method:Stated


General Appearance:  WD/WN, no apparent distress, other (DOES NOT APPEAR ILL OR 

TO BE IN ANY DISCOMFORT OR DISTRESS. SPEECH IS CLEAR AND GAIT IS STEADY ON 

ARRIVAL. )


HEENT:  PERRL/EOMI, other (NO FACIAL DROOP)


Neck:  non-tender, full range of motion, supple, normal inspection; No carotid 

bruit


Respiratory:  normal breath sounds, no respiratory distress, no accessory muscle

use


Cardiovascular:  normal peripheral pulses, regular rate, rhythm, no edema, no 

JVD, no murmur


Gastrointestinal:  soft, tenderness (MILD PERIUMBILICAL TENDERNESS. SURGICAL 

SITES WELL HEALED WITHOUT SIGNS OF INFECTION. )


Back:  normal inspection


Extremities:  normal range of motion, non-tender, normal inspection, no pedal 

edema, no calf tenderness, normal capillary refill


Neurologic/Psychiatric:  CNs II-XII nml as tested, no motor/sensory deficits, 

alert, oriented x 3; No abnormal cerebellar tests; other (FLAT AFFECT. NORMAL 

SPEECH. )


Crainal Nerves:  normal hearing, normal speech


Coordination/Gait:  normal finger to nose, normal gait, negative Romberg's sign


Motor/Sensory:  no motor deficit, no sensory deficit, no pronator drift


Skin:  normal color, warm/dry, tattoos/piercings (TATTOOS)





Stroke


Onset of Symptoms


Onset of Symptoms:  Yes





NIH Stroke Scale Assessment





   Select: Initial Level of Consciousness: 0=Alert (0), Level of Consciousness-

   Questions: 0=Answers both month/age (0), LOC Commands: 0=Performs both tasks 

   (0), Visual Fields: 0=No visual loss (0), Facial Movement (Facial Paresis): 

   0=Normal symmetrical mnt (0), Motor Function-Arms Right: 0=No drift (0), 

   Motor Function-Arms Left: 0=No drift (0), Motor Function-Legs Right: 0=No 

   drift (0), Motor Function-Legs Left: 0=No drift (0), Sensory: 0=Normal:no 

   loss (0), Best Language: 0=No aphasia (0), Dysarthria: 0=Normal (0), 

   Extinction & Inattention: 0=No abnormality (0), Total: 0





Stroke Thrombolytic Exclusion


Age 18 or Over:  Yes


Acute intenal hemorrhage:  No


History of CVA:  Yes


Uncontrolled Coagulation Defec:  No


Intracranial Hemorrhage:  No


Severe Hypertension:  No


GI or  Bleed:  No


Subarachnoid Hemorrhage:  No


Intracranial Neoplasm/Aneurysm:  No


Oral Anticoagulants:  No


Surgery or Trauma:  Yes


Puncture of Non-Compressible V:  No


Recent CPR:  No


Diabetic Hemorrhagic Retinopat:  No


Organ Biopsy:  No


Glucose:  No


Significant Hepatic Dysfunctio:  No


NIH Stoke Scale >22:  No


Bacterial Endocarditis:  No


Pericarditis:  No


Improving Symptoms:  Yes


Platelets:  No


TPA Contraindication:  Yes (RECENT SURGERY)





IV - TPa Received


IV - TPa Procedure Performed?:  No (PT HAS NO FOCAL DEFICITS, WITH CLEAR SPEECH 

AND STEADY GAIT. ALSO HAD RECENT SURGERY )





Progress/Results/Core Measures


Results/Orders


Lab Results





Laboratory Tests








Test


 1/22/22


21:25 1/22/22


22:40 Range/Units


 


 


White Blood Count


 7.8 


 


 4.3-11.0


10^3/uL


 


Red Blood Count


 4.69 


 


 4.30-5.52


10^6/uL


 


Hemoglobin 15.3   13.3-17.7  g/dL


 


Hematocrit 45   40-54  %


 


Mean Corpuscular Volume 96   80-99  fL


 


Mean Corpuscular Hemoglobin 33   25-34  pg


 


Mean Corpuscular Hemoglobin


Concent 34 


 


 32-36  g/dL





 


Red Cell Distribution Width 12.0   10.0-14.5  %


 


Platelet Count


 287 


 


 130-400


10^3/uL


 


Mean Platelet Volume 8.7 L  9.0-12.2  fL


 


Immature Granulocyte % (Auto) 0    %


 


Neutrophils (%) (Auto) 61   42-75  %


 


Lymphocytes (%) (Auto) 25   12-44  %


 


Monocytes (%) (Auto) 8   0-12  %


 


Eosinophils (%) (Auto) 6   0-10  %


 


Basophils (%) (Auto) 1   0-10  %


 


Neutrophils # (Auto)


 4.7 


 


 1.8-7.8


10^3/uL


 


Lymphocytes # (Auto)


 1.9 


 


 1.0-4.0


10^3/uL


 


Monocytes # (Auto)


 0.6 


 


 0.0-1.0


10^3/uL


 


Eosinophils # (Auto)


 0.4 H


 


 0.0-0.3


10^3/uL


 


Basophils # (Auto)


 0.1 


 


 0.0-0.1


10^3/uL


 


Immature Granulocyte # (Auto)


 0.0 


 


 0.0-0.1


10^3/uL


 


Prothrombin Time 13.5   12.2-14.7  SEC


 


INR Comment 1.0   0.8-1.4  


 


Activated Partial


Thromboplast Time 31 


 


 24-35  SEC





 


D-Dimer


 0.66 H


 


 0.00-0.49


UG/ML


 


Sodium Level 138   135-145  MMOL/L


 


Potassium Level 3.3 L  3.6-5.0  MMOL/L


 


Chloride Level 103     MMOL/L


 


Carbon Dioxide Level 22   21-32  MMOL/L


 


Anion Gap 13   5-14  MMOL/L


 


Blood Urea Nitrogen 5 L  7-18  MG/DL


 


Creatinine


 0.99 


 


 0.60-1.30


MG/DL


 


Estimat Glomerular Filtration


Rate 97 


 


  





 


BUN/Creatinine Ratio 5    


 


Glucose Level 105     MG/DL


 


Calcium Level 9.0   8.5-10.1  MG/DL


 


Corrected Calcium 8.7   8.5-10.1  MG/DL


 


Total Bilirubin 0.6   0.1-1.0  MG/DL


 


Aspartate Amino Transf


(AST/SGOT) 24 


 


 5-34  U/L





 


Alanine Aminotransferase


(ALT/SGPT) 34 


 


 0-55  U/L





 


Alkaline Phosphatase 97     U/L


 


Troponin I < 0.028   <0.028  NG/ML


 


Total Protein 7.5   6.4-8.2  GM/DL


 


Albumin 4.4   3.2-4.5  GM/DL


 


Serum Alcohol < 10   <10  MG/DL


 


Urine Color  YELLOW   


 


Urine Clarity  CLEAR   


 


Urine pH  6.0  5-9  


 


Urine Specific Gravity  1.010 L 1.016-1.022  


 


Urine Protein  NEGATIVE  NEGATIVE  


 


Urine Glucose (UA)  NEGATIVE  NEGATIVE  


 


Urine Ketones  NEGATIVE  NEGATIVE  


 


Urine Nitrite  NEGATIVE  NEGATIVE  


 


Urine Bilirubin  NEGATIVE  NEGATIVE  


 


Urine Urobilinogen  0.2  < = 1.0  MG/DL


 


Urine Leukocyte Esterase  NEGATIVE  NEGATIVE  


 


Urine RBC (Auto)  NEGATIVE  NEGATIVE  


 


Urine RBC  RARE   /HPF


 


Urine WBC  RARE   /HPF


 


Urine Squamous Epithelial


Cells 


 NONE 


  /HPF





 


Urine Crystals  NONE   /LPF


 


Urine Bacteria  TRACE   /HPF


 


Urine Casts  NONE   /LPF


 


Urine Mucus  NEGATIVE   /LPF


 


Urine Culture Indicated  NO   


 


Urine Opiates Screen  POSITIVE H NEGATIVE  


 


Urine Oxycodone Screen  NEGATIVE  NEGATIVE  


 


Urine Methadone Screen  NEGATIVE  NEGATIVE  


 


Urine Propoxyphene Screen  NEGATIVE  NEGATIVE  


 


Urine Barbiturates Screen  NEGATIVE  NEGATIVE  


 


Ur Tricyclic Antidepressants


Screen 


 NEGATIVE 


 NEGATIVE  





 


Urine Phencyclidine Screen  NEGATIVE  NEGATIVE  


 


Urine Amphetamines Screen  NEGATIVE  NEGATIVE  


 


Urine Methamphetamines Screen  NEGATIVE  NEGATIVE  


 


Urine Benzodiazepines Screen  NEGATIVE  NEGATIVE  


 


Urine Cocaine Screen  NEGATIVE  NEGATIVE  


 


Urine Cannabinoids Screen  NEGATIVE  NEGATIVE  








My Orders





Orders - RIDDHI VAUGHN DO


Cbc With Automated Diff (1/22/22 21:31)


Protime With Inr (1/22/22 21:31)


Partial Thromboplastin Time (1/22/22 21:31)


Comprehensive Metabolic Panel (1/22/22 21:31)


Fibrin Degradation Products (1/22/22 21:31)


Troponin I [place] (1/22/22 21:31)


Ua Culture If Indicated (1/22/22 21:31)


Chest 1 View, Ap/Pa Only (1/22/22 21:31)


Ekg Tracing (1/22/22 21:31)


Accucheck Stat ONCE (1/22/22 21:31)


Ed Iv/Invasive Line Start (1/22/22 21:31)


Ed Iv/Invasive Line Start (1/22/22 21:31)


Vital Signs Stroke Patient Q15M (1/22/22 21:31)


Ct Head Wo-R/O Stroke (1/22/22 21:31)


O2 (1/22/22 21:31)


Intake & Output 06,14,22 (1/22/22 21:31)


Monitor-Rhythm Ecg Trace Only (1/22/22 21:31)


Dysphagia Screening Tool (1/22/22 21:31)


Alcohol (1/22/22 21:31)


Drug Screen Stat (Urine) (1/22/22 21:31)


Ct Angio Head/Neck (1/22/22 22:10)


Acetaminophen  Tablet (Tylenol  Tablet) (1/22/22 22:45)





Medications Given in ED





Current Medications








 Medications  Dose


 Ordered  Sig/Duy


 Route  Start Time


 Stop Time Status Last Admin


Dose Admin


 


 Acetaminophen  1,000 mg  ONCE  ONCE


 PO  1/22/22 22:45


 1/22/22 22:46 DC 1/22/22 22:43


1,000 MG








Vital Signs/I&O











 1/22/22 1/23/22





 21:20 00:30


 


Temp 36.9 36.7


 


Pulse 86 58


 


Resp 14 16


 


B/P (MAP) 125/98 (107) 114/77


 


Pulse Ox 96 93


 


O2 Delivery Room Air Room Air














Blood Pressure Mean:                    107











Progress


Progress Note :  


Progress Note


UNEVENTFUL ER STAY





NO NEUROLOGICAL DEFICITS DURING ER STAY





ONLY WANTS WORK NOTE AND PAIN MEDICATION FOR HEADACHE. 


GIVEN TYLENOL FOR HEADACHE


Initial ECG Impression Date:  Jan 22, 2022


Initial ECG Impression Time:  21:25


Initial ECG Rate:  80


Initial ECG Rhythm:  Normal Sinus





Diagnostic Imaging





Comments


CXR--NO ACUTE PROCESS, PENDING RADIOLOGIST REVIEW





CT HEAD--PER RADIOLOGIST REPORT AT 0623


FINDINGS: There is no intracranial hemorrhage. Prominence of the


extra-axial bifrontal CSF spaces, chronic. No acute or suspicious


extra-axial fluid collection. There is no loss of the gray-white


matter differentiations. No focal or generalized cerebral edema.


No suspicious intraluminal arterial hyperdensities. The basilar


cisterns patent. No sulcal effacement. No evidence for elevated


pressures. Orbits, sinuses and calvarium nonacute. There has been


no change.





IMPRESSION: Stable head CT. No hemorrhage, edema or acute


appearing abnormality.





CT ANGIOGRAM HEAD/NECK--PER [facility] VIA FAX AT [id number]


NO ACUTE PROCESS, NO LARGE VESSEL OCCLUSION


   Reviewed:  Reviewed by Me





Departure


Impression





   Primary Impression:  


   Headache


   Additional Impression:  


   SUBJECTIVE PARESTHESIAS


Disposition:  01 HOME, SELF-CARE


Condition:  Improved





Departure-Patient Inst.


Decision time for Depature:  00:26


Referrals:  


[facility] (PCP)


Primary Care Physician








HENOK JULIAN (Family)


Primary Care Physician


Patient Instructions:  Paresthesia (DC), Headache, Adult (DC)





Add. Discharge Instructions:  


HOME, REST








LOTS OF CLEAR LIQUIDS





TYLENOL 1 GRAM AND MOTRIN 800 MG 4 TIMES A DAY FOR PAIN 





FOLLOW UP WITH [facility] ON MONDAY FOR FURTHER CARE, RETURN TO ER IF WORSE





All discharge instructions reviewed with patient and/or family. Voiced 

understanding.


Work/School Note:  Work Release Form   Date Seen in the Emergency Department:  

Jan 23, 2022


   Return to Work:  Jan 25, 2022











RIDDHI VAUGHN DO                 Jan 23, 2022 00:27

## 2022-01-27 ENCOUNTER — HOSPITAL ENCOUNTER (EMERGENCY)
Dept: HOSPITAL 75 - ER | Age: 42
Discharge: HOME | End: 2022-01-27
Payer: MEDICARE

## 2022-01-27 VITALS — HEIGHT: 71.97 IN | BODY MASS INDEX: 31.29 KG/M2 | WEIGHT: 231.04 LBS

## 2022-01-27 VITALS — SYSTOLIC BLOOD PRESSURE: 119 MMHG | DIASTOLIC BLOOD PRESSURE: 81 MMHG

## 2022-01-27 DIAGNOSIS — S16.1XXA: Primary | ICD-10-CM

## 2022-01-27 DIAGNOSIS — Z86.73: ICD-10-CM
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
## 2022-01-27 DIAGNOSIS — G89.29: ICD-10-CM

## 2022-01-27 DIAGNOSIS — M54.9: ICD-10-CM

## 2022-01-27 DIAGNOSIS — Z86.16: ICD-10-CM

## 2022-01-27 DIAGNOSIS — Z79.891: ICD-10-CM

## 2022-01-27 DIAGNOSIS — M54.12: ICD-10-CM

## 2022-01-27 DIAGNOSIS — F17.210: ICD-10-CM

## 2022-01-27 LAB
ALBUMIN SERPL-MCNC: 4.1 GM/DL (ref 3.2–4.5)
ALP SERPL-CCNC: 77 U/L (ref 40–136)
ALT SERPL-CCNC: 22 U/L (ref 0–55)
BASOPHILS # BLD AUTO: 0.1 10^3/UL (ref 0–0.1)
BASOPHILS NFR BLD AUTO: 1 % (ref 0–10)
BILIRUB SERPL-MCNC: 0.3 MG/DL (ref 0.1–1)
BUN/CREAT SERPL: 7
CALCIUM SERPL-MCNC: 8.9 MG/DL (ref 8.5–10.1)
CHLORIDE SERPL-SCNC: 106 MMOL/L (ref 98–107)
CO2 SERPL-SCNC: 23 MMOL/L (ref 21–32)
CREAT SERPL-MCNC: 0.87 MG/DL (ref 0.6–1.3)
EOSINOPHIL # BLD AUTO: 0.4 10^3/UL (ref 0–0.3)
EOSINOPHIL NFR BLD AUTO: 4 % (ref 0–10)
GFR SERPLBLD BASED ON 1.73 SQ M-ARVRAT: 110 ML/MIN
GLUCOSE SERPL-MCNC: 87 MG/DL (ref 70–105)
HCT VFR BLD CALC: 46 % (ref 40–54)
HGB BLD-MCNC: 15.3 G/DL (ref 13.3–17.7)
LYMPHOCYTES # BLD AUTO: 1.3 10^3/UL (ref 1–4)
LYMPHOCYTES NFR BLD AUTO: 13 % (ref 12–44)
MANUAL DIFFERENTIAL PERFORMED BLD QL: NO
MCH RBC QN AUTO: 33 PG (ref 25–34)
MCHC RBC AUTO-ENTMCNC: 34 G/DL (ref 32–36)
MCV RBC AUTO: 98 FL (ref 80–99)
MONOCYTES # BLD AUTO: 0.7 10^3/UL (ref 0–1)
MONOCYTES NFR BLD AUTO: 7 % (ref 0–12)
NEUTROPHILS # BLD AUTO: 7.7 10^3/UL (ref 1.8–7.8)
NEUTROPHILS NFR BLD AUTO: 76 % (ref 42–75)
PLATELET # BLD: 255 10^3/UL (ref 130–400)
PMV BLD AUTO: 8.6 FL (ref 9–12.2)
POTASSIUM SERPL-SCNC: 3.6 MMOL/L (ref 3.6–5)
PROT SERPL-MCNC: 6.6 GM/DL (ref 6.4–8.2)
SODIUM SERPL-SCNC: 138 MMOL/L (ref 135–145)
WBC # BLD AUTO: 10.1 10^3/UL (ref 4.3–11)

## 2022-01-27 PROCEDURE — 85379 FIBRIN DEGRADATION QUANT: CPT

## 2022-01-27 PROCEDURE — 86141 C-REACTIVE PROTEIN HS: CPT

## 2022-01-27 PROCEDURE — 85025 COMPLETE CBC W/AUTO DIFF WBC: CPT

## 2022-01-27 PROCEDURE — 80053 COMPREHEN METABOLIC PANEL: CPT

## 2022-01-27 PROCEDURE — 36415 COLL VENOUS BLD VENIPUNCTURE: CPT

## 2022-01-27 NOTE — ED GENERAL
General


Chief Complaint:  General Problems/Pain


Stated Complaint:  HA, FACIAL/NECK NUMBNESS L SIDE


Nursing Triage Note:  


pt reports knot on head, ha, left sided arm numbness, neck pain, lower lip 


swelling. pt was seen 2 nights ago for same thing and all tests came back 


negative. Pt has mri ordered but has not received a phone call to have it done 


yet.


Source of Information:  Patient


Exam Limitations:  No Limitations





History of Present Illness


Date Seen by Provider:  Jan 27, 2022


Time Seen by Provider:  09:20


Initial Comments


Here with a variety of complaints including left-sided neck pain and radiation 

to the left arm and the left side of the face that seems to be coming from over 

the scalp.  Denies nausea, vomiting, chest pain, breathing problems but does 

have some weakness.  Apparently had stroke that involve the left side a few 

years ago that resolved.  Was seen on 1/22/2022 for same complaint and had CT 

and CT angiogram done which did not show any acute findings or large vessel 

occlusions.  For the pain he has been trying Tylenol only and that has not 

helped.  Follows with [facility] and has been seen with referral for MRI 

and neurology.  Those are still pending.  Denies other complaints.  He is 

vaccinated for COVID-19 and did have Covid last month and recovered.


Timing/Duration:  5-6 Days, Changing Over Time


Severity:  Moderate


Modifying Factors:  worse with Immobilization; improves with Rest


Associated Systoms:  No Cough, No Fever/Chills, No Nausea/Vomiting, No Shortness

of Air





Allergies and Home Medications


Allergies


Coded Allergies:  


     Sulfa (Sulfonamide Antibiotics) (Verified  Allergy, Severe, HIVES, 

12/30/21)


     levofloxacin (Verified  Allergy, Unknown, 12/30/21)


     doxycycline (Verified  Adverse Reaction, Mild, Vomiting, 12/30/21)





Patient Home Medication List


Home Medication List Reviewed:  Yes


Cefdinir (Cefdinir) 300 Mg Capsule, 300 MG PO BID


   Prescribed by: SOHAM JAMISON on 1/2/22 0236


Clindamycin HCl (Clindamycin HCl) 300 Mg Capsule, 300 MG PO QID


   Prescribed by: SOHAM JAMISON on 1/2/22 0236


Docusate Sodium (Colace) 100 Mg Capsule, 100 MG PO BID


   Prescribed by: NANY PHILIP on 12/30/21 1052


Hydrocodone/Acetaminophen (Hydrocodone-Acetamin 5-325 mg) 1 Each Tablet, 1 EACH 

PO Q4H PRN for PAIN-MODERATE (5-7)


   Prescribed by: NANY PHILIP on 12/30/21 1054


Hydrocodone/Acetaminophen (Hydrocodone-Acetamin 5-325 mg) 1 Each Tablet, 1 TAB 

PO Q4H PRN for PAIN-MODERATE (5-7)


   Prescribed by: BRYAN AGUILAR on 1/11/22 1539





Review of Systems


Review of Systems


Constitutional:  see HPI; No chills, No fever, No weakness


EENTM:  No nose congestion, No throat pain


Respiratory:  No cough, No short of breath


Cardiovascular:  No chest pain, No edema


Gastrointestinal:  No nausea, No vomiting


Genitourinary:  no symptoms reported


Musculoskeletal:  joint pain, neck pain


Skin:  No change in color, No lesions


Psychiatric/Neurological:  Numbness


Hematologic/Lymphatic:  No Symptoms Reported





All Other Systems Reviewed


Negative Unless Noted:  Yes





Past Medical-Social-Family Hx


Patient Social History


Tobacco Use?:  Yes


Tobacco type used:  Cigarettes


Smoking Status:  Current Everyday Smoker


Substance use?:  No


Alcohol Use?:  Yes


Alcohol Frequency:  Several times a month


Pt feels they are or have been:  No





Immunizations Up To Date


First/Initial COVID19 Vaccinat:  3/21


Second COVID19 Vaccination Bo:  4/21


COVID19 Vaccine :  Pzwally





Seasonal Allergies


Seasonal Allergies:  No





Past Medical History


Surgery/Hospitalization HX:  


CHOLECYSTECTOMY, UMBILICAL HERNIA REPAIR, RENAL STONES, APPY,VASECTOMY, T/A, CVA




2019, ANXIETY, PTSD


Surgeries:  Yes (KIDNEY STONES--BASKET REMOVAL, )


Abdominal, Adenoidectomy, Appendectomy, Gallbladder, Tonsillectomy, Vasectomy


Respiratory:  No


Currently Using CPAP:  No


Currently Using BIPAP:  No


Cardiac:  No


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


Stroke


Reproductive Disorders:  No


Genitourinary:  Yes


Kidney Stones


Gastrointestinal:  Yes (CHRONIC GI COMPLAINTS)


Abdominal Hernia, Gall Bladder Disease


Musculoskeletal:  Yes


Chronic Back Pain


Endocrine:  Yes (hypoglycemia)


HEENT:  No


Tonsilitis


Cancer:  No


Psychosocial:  Yes


Anxiety, PTSD


Integumentary:  No


Blood Disorders:  No





Family Medical History


Reviewed Nursing Family Hx


No Pertinent Family Hx





SOCIAL HISTORY:


-ETOH--OCCASIONAL USE


-DRUGS--DENIES USE, BUT UDS HAS BEEN + FOR THC


-SMOKES 1 PPD





PAST SURGICAL HISTORY:


-KIDNEY STONES-BASKET REMOVAL


-TONSILLECTOMY/ADENOIDECTOMY


-VASECTOMY


-CARDIAC CATH 12/15/20 BY :


CONCLUSIONS:


1.  No angiographically significant coronary artery disease.


2.  Normal left ventricular end-diastolic pressure.


3.  Normal left ventricular systolic function with ejection fraction of


55% to


60%.


DISCUSSION AND RECOMMENDATIONS:


Based on results of the study, chest discomfort does not appear to be of


coronary origin.  Risk factor modification is advised.  Have advised to


refrain


from tobacco use.  Other risk factor modification has also been discussed. 


Outpatient followup is advised.





-LAPAROSCOPIC CHOLECYSTECTOMY AND UMBILICAL HERNIA REPAIR 12/30/21 BY DR. PHILIP.





Physical Exam


Vital Signs





Vital Signs - First Documented








 1/27/22





 08:40


 


Temp 36.1


 


Pulse 77


 


Resp 16


 


B/P (MAP) 142/103 (116)


 


Pulse Ox 98


 


O2 Delivery Room Air





Capillary Refill : Less Than 3 Seconds


Height, Weight, BMI


Height: 6'"


Weight: 242lbs. oz. 109.527310ma; 31.00 BMI


Method:Stated


General Appearance:  No Apparent Distress, Anxious


HEENT:  PERRL/EOMI, TMs Normal, Pharynx Normal


Neck:  Full Range of Motion; No Carotid Bruit, No JVD, No Lymphadenopathy (L), 

No Lymphadenopathy (R); Tender Lateral (Left-sided along the posterior 

musculature)


Respiratory:  Lungs Clear, Normal Breath Sounds


Cardiovascular:  Regular Rate, Rhythm, No Murmur


Gastrointestinal:  Normal Bowel Sounds, Non Tender, Soft


Back:  Normal Inspection, No CVA Tenderness, No Vertebral Tenderness


Extremity:  Normal Inspection, Normal Range of Motion, Non Tender, No Calf 

Tenderness, No Pedal Edema


Neurologic/Psychiatric:  Alert, Oriented x3, No Motor/Sensory Deficits, Normal 

Mood/Affect, CNs II-XII Norm as Tested, Other (Stroke scale 0.  Finger-to-nose 

appropriate.  No drift.  No facial droop.)


Skin:  Normal Color, Warm/Dry





Progress/Results/Core Measures


Suspected Sepsis


SIRS


Temperature: 


Pulse: 77 


Respiratory Rate: 16


 


Laboratory Tests


1/27/22 09:41: White Blood Count 10.1


Blood Pressure 142 /103 


Mean: 116


 


Laboratory Tests


1/27/22 09:41: 


Creatinine 0.87, Platelet Count 255, Total Bilirubin 0.3








Results/Orders


Lab Results





Laboratory Tests








Test


 1/27/22


09:41 Range/Units


 


 


White Blood Count


 10.1 


 4.3-11.0


10^3/uL


 


Red Blood Count


 4.67 


 4.30-5.52


10^6/uL


 


Hemoglobin 15.3  13.3-17.7  g/dL


 


Hematocrit 46  40-54  %


 


Mean Corpuscular Volume 98  80-99  fL


 


Mean Corpuscular Hemoglobin 33  25-34  pg


 


Mean Corpuscular Hemoglobin


Concent 34 


 32-36  g/dL





 


Red Cell Distribution Width 12.2  10.0-14.5  %


 


Platelet Count


 255 


 130-400


10^3/uL


 


Mean Platelet Volume 8.6 L 9.0-12.2  fL


 


Immature Granulocyte % (Auto) 0   %


 


Neutrophils (%) (Auto) 76 H 42-75  %


 


Lymphocytes (%) (Auto) 13  12-44  %


 


Monocytes (%) (Auto) 7  0-12  %


 


Eosinophils (%) (Auto) 4  0-10  %


 


Basophils (%) (Auto) 1  0-10  %


 


Neutrophils # (Auto)


 7.7 


 1.8-7.8


10^3/uL


 


Lymphocytes # (Auto)


 1.3 


 1.0-4.0


10^3/uL


 


Monocytes # (Auto)


 0.7 


 0.0-1.0


10^3/uL


 


Eosinophils # (Auto)


 0.4 H


 0.0-0.3


10^3/uL


 


Basophils # (Auto)


 0.1 


 0.0-0.1


10^3/uL


 


Immature Granulocyte # (Auto)


 0.0 


 0.0-0.1


10^3/uL


 


D-Dimer


 < 0.27 


 0.00-0.49


UG/ML


 


Sodium Level 138  135-145  MMOL/L


 


Potassium Level 3.6  3.6-5.0  MMOL/L


 


Chloride Level 106    MMOL/L


 


Carbon Dioxide Level 23  21-32  MMOL/L


 


Anion Gap 9  5-14  MMOL/L


 


Blood Urea Nitrogen 6 L 7-18  MG/DL


 


Creatinine


 0.87 


 0.60-1.30


MG/DL


 


Estimat Glomerular Filtration


Rate 110 


  





 


BUN/Creatinine Ratio 7   


 


Glucose Level 87    MG/DL


 


Calcium Level 8.9  8.5-10.1  MG/DL


 


Corrected Calcium 8.8  8.5-10.1  MG/DL


 


Total Bilirubin 0.3  0.1-1.0  MG/DL


 


Aspartate Amino Transf


(AST/SGOT) 17 


 5-34  U/L





 


Alanine Aminotransferase


(ALT/SGPT) 22 


 0-55  U/L





 


Alkaline Phosphatase 77    U/L


 


C-Reactive Protein High


Sensitivity 0.27 


 0.00-0.50


MG/DL


 


Total Protein 6.6  6.4-8.2  GM/DL


 


Albumin 4.1  3.2-4.5  GM/DL








My Orders





Orders - RADHA SANTIAGO MD


Cbc With Automated Diff (1/27/22 09:34)


Comprehensive Metabolic Panel (1/27/22 09:34)


Hs C Reactive Protein (1/27/22 09:34)


Fibrin Degradation Products (1/27/22 09:34)


Ed Iv/Invasive Line Start (1/27/22 09:34)


Ketorolac Injection (Toradol Injection) (1/27/22 09:34)


Orphenadrine Inj (Ed Only) (Norflex Inje (1/27/22 09:34)





Vital Signs/I&O











 1/27/22





 08:40


 


Temp 36.1


 


Pulse 77


 


Resp 16


 


B/P (MAP) 142/103 (116)


 


Pulse Ox 98


 


O2 Delivery Room Air





Capillary Refill : Less Than 3 Seconds








Blood Pressure Mean:                    116








Progress Note :  


Progress Note


Seen and evaluated.  IV, labs, Toradol 30 mg IV and Norflex 60 mg IV due to left

lateral neck pain.  Does have some scalp tenderness as well.  Blood pressure 

noted to be slightly elevated.  We will recheck labs.  I have reviewed labs from

previous visit on 1/22/22 and have reviewed CT reports as well.  No large vessel

occlusion or other acute findings to suggest stroke at that time.  We have had 

persistence of symptoms since.  We will compare labs to previous and consider CT

if indicated after reevaluation from pain medication.  Monitor patient.  1049: 

Patient feeling a little better.  D-dimer negative.  Labs reviewed and showed no

significant findings otherwise.  Do not have concerns for stroke at this time.  

I do believe this is likely muscle tension and inflammation.  We will initiate 

outpatient muscle relaxer and steroid and he will continue with NSAIDs.  He will

also continue with his doctor for follow-up for MRI neurology as needed.  

Discharged home with return precautions.  Patient verbalized understanding 

instructions and agreement with plan.





Departure


Impression





   Primary Impression:  


   Neck muscle strain


   Qualified Codes:  S16.1XXA - Strain of muscle, fascia and tendon at neck 

   level, initial encounter


   Additional Impression:  


   Cervical radiculopathy


Disposition:  01 HOME, SELF-CARE


Condition:  Stable





Departure-Patient Inst.


Decision time for Depature:  10:51


Referrals:  


[facility] (PCP)


Primary Care Physician








HENOK JULIAN (Family)


Primary Care Physician


Patient Instructions:  Cervical Muscle Strain (DC), Neck Pain ED





Add. Discharge Instructions:  








All discharge instructions reviewed with patient and/or family. Voiced 

understanding.





Take medications as directed.  Follow-up with your doctor for recheck and 

further evaluation and continue evaluation for MRI and neurology if needed.  You

may use topical agents such as icy hot with lidocaine or similar to area of 

concern to reduce pain.  Return for worse pain, weakness, vision or balance 

problems, difficulty with speech or walking or other concerns as needed.


Scripts


Prednisone (Prednisone) 20 Mg Tab


40 MG PO DAILY, #10 TAB 0 Refills


   Prov: RADHA SANTIAGO MD         1/27/22 


Naproxen (Naprosyn) 500 Mg Tablet


500 MG PO BID, #30 TAB 0 Refills


   Prov: RADHA SANTIAGO MD         1/27/22 


Cyclobenzaprine HCl (Cyclobenzaprine HCl) 10 Mg Tablet


10 MG PO Q8H PRN for SPASMS, #15 TAB 0 Refills


   Prov: RADHA SANTIAGO MD         1/27/22











RADHA SANTIAGO MD          Jan 27, 2022 09:50

## 2022-05-25 ENCOUNTER — HOSPITAL ENCOUNTER (EMERGENCY)
Dept: HOSPITAL 75 - ER | Age: 42
Discharge: HOME | End: 2022-05-25
Payer: MEDICARE

## 2022-05-25 VITALS — BODY MASS INDEX: 33.21 KG/M2 | WEIGHT: 242.51 LBS | HEIGHT: 71.65 IN

## 2022-05-25 VITALS — DIASTOLIC BLOOD PRESSURE: 87 MMHG | SYSTOLIC BLOOD PRESSURE: 134 MMHG

## 2022-05-25 DIAGNOSIS — K42.9: Primary | ICD-10-CM

## 2022-05-25 DIAGNOSIS — Z90.49: ICD-10-CM

## 2022-05-25 DIAGNOSIS — F17.210: ICD-10-CM

## 2022-05-25 LAB
ALBUMIN SERPL-MCNC: 4.2 GM/DL (ref 3.2–4.5)
ALP SERPL-CCNC: 89 U/L (ref 40–136)
ALT SERPL-CCNC: 26 U/L (ref 0–55)
APTT PPP: YELLOW S
BACTERIA #/AREA URNS HPF: NEGATIVE /HPF
BASOPHILS # BLD AUTO: 0.1 10^3/UL (ref 0–0.1)
BASOPHILS NFR BLD AUTO: 1 % (ref 0–10)
BILIRUB SERPL-MCNC: 0.3 MG/DL (ref 0.1–1)
BILIRUB UR QL STRIP: NEGATIVE
BUN/CREAT SERPL: 4
CALCIUM SERPL-MCNC: 9.1 MG/DL (ref 8.5–10.1)
CHLORIDE SERPL-SCNC: 104 MMOL/L (ref 98–107)
CO2 SERPL-SCNC: 23 MMOL/L (ref 21–32)
CREAT SERPL-MCNC: 0.96 MG/DL (ref 0.6–1.3)
EOSINOPHIL # BLD AUTO: 0.5 10^3/UL (ref 0–0.3)
EOSINOPHIL NFR BLD AUTO: 7 % (ref 0–10)
FIBRINOGEN PPP-MCNC: CLEAR MG/DL
GFR SERPLBLD BASED ON 1.73 SQ M-ARVRAT: 100 ML/MIN
GLUCOSE SERPL-MCNC: 109 MG/DL (ref 70–105)
GLUCOSE UR STRIP-MCNC: NEGATIVE MG/DL
HCT VFR BLD CALC: 50 % (ref 40–54)
HGB BLD-MCNC: 16.8 G/DL (ref 13.3–17.7)
KETONES UR QL STRIP: NEGATIVE
LEUKOCYTE ESTERASE UR QL STRIP: NEGATIVE
LYMPHOCYTES # BLD AUTO: 2.2 10^3/UL (ref 1–4)
LYMPHOCYTES NFR BLD AUTO: 27 % (ref 12–44)
MANUAL DIFFERENTIAL PERFORMED BLD QL: NO
MCH RBC QN AUTO: 33 PG (ref 25–34)
MCHC RBC AUTO-ENTMCNC: 34 G/DL (ref 32–36)
MCV RBC AUTO: 98 FL (ref 80–99)
MONOCYTES # BLD AUTO: 0.7 10^3/UL (ref 0–1)
MONOCYTES NFR BLD AUTO: 9 % (ref 0–12)
NEUTROPHILS # BLD AUTO: 4.5 10^3/UL (ref 1.8–7.8)
NEUTROPHILS NFR BLD AUTO: 56 % (ref 42–75)
NITRITE UR QL STRIP: NEGATIVE
PH UR STRIP: 6.5 [PH] (ref 5–9)
PLATELET # BLD: 249 10^3/UL (ref 130–400)
PMV BLD AUTO: 8.5 FL (ref 9–12.2)
POTASSIUM SERPL-SCNC: 3.2 MMOL/L (ref 3.6–5)
PROT SERPL-MCNC: 7 GM/DL (ref 6.4–8.2)
PROT UR QL STRIP: NEGATIVE
RBC #/AREA URNS HPF: (no result) /HPF
RENAL EPI CELLS #/AREA URNS HPF: (no result) /HPF
SODIUM SERPL-SCNC: 140 MMOL/L (ref 135–145)
SP GR UR STRIP: <=1.005 (ref 1.02–1.02)
SQUAMOUS #/AREA URNS HPF: (no result) /HPF
WBC # BLD AUTO: 8.1 10^3/UL (ref 4.3–11)
WBC #/AREA URNS HPF: (no result) /HPF

## 2022-05-25 PROCEDURE — 85025 COMPLETE CBC W/AUTO DIFF WBC: CPT

## 2022-05-25 PROCEDURE — 81000 URINALYSIS NONAUTO W/SCOPE: CPT

## 2022-05-25 PROCEDURE — 87636 SARSCOV2 & INF A&B AMP PRB: CPT

## 2022-05-25 PROCEDURE — 80053 COMPREHEN METABOLIC PANEL: CPT

## 2022-05-25 PROCEDURE — 74176 CT ABD & PELVIS W/O CONTRAST: CPT

## 2022-05-25 PROCEDURE — 36415 COLL VENOUS BLD VENIPUNCTURE: CPT

## 2022-05-25 NOTE — ED ABDOMINAL PAIN
General


Chief Complaint:  Abdominal/GI Problems


Stated Complaint:  STOMACH PAIN, BODY ACHES


Source of Information:  Patient


Exam Limitations:  No Limitations





History of Present Illness


Date Seen by Provider:  May 25, 2022


Time Seen by Provider:  21:49





Allergies and Home Medications


Allergies


Coded Allergies:  


     Sulfa (Sulfonamide Antibiotics) (Verified  Allergy, Severe, HIVES, 

12/30/21)


     levofloxacin (Verified  Allergy, Unknown, 12/30/21)


     doxycycline (Verified  Adverse Reaction, Mild, Vomiting, 12/30/21)





Patient Home Medication List


Cefdinir (Cefdinir) 300 Mg Capsule, 300 MG PO BID


   Prescribed by: SOHAM JAMISON on 1/2/22 0236


Clindamycin HCl (Clindamycin HCl) 300 Mg Capsule, 300 MG PO QID


   Prescribed by: SOHAM JAMISON on 1/2/22 0236


Cyclobenzaprine HCl (Cyclobenzaprine HCl) 10 Mg Tablet, 10 MG PO Q8H PRN for 

SPASMS


   Prescribed by: RADHA SANTIAGO on 1/27/22 1053


Docusate Sodium (Colace) 100 Mg Capsule, 100 MG PO BID


   Prescribed by: NANY PHILIP on 12/30/21 1052


Hydrocodone/Acetaminophen (Hydrocodone-Acetamin 5-325 mg) 1 Each Tablet, 1 EACH 

PO Q4H PRN for PAIN-MODERATE (5-7)


   Prescribed by: NANY PHILIP on 12/30/21 1054


Hydrocodone/Acetaminophen (Hydrocodone-Acetamin 5-325 mg) 1 Each Tablet, 1 TAB 

PO Q4H PRN for PAIN-MODERATE (5-7)


   Prescribed by: BRYAN AGUILAR on 1/11/22 1539


Naproxen (Naprosyn) 500 Mg Tablet, 500 MG PO BID


   Prescribed by: RADHA SANTIAGO on 1/27/22 1053


Prednisone (Prednisone) 20 Mg Tab, 40 MG PO DAILY


   Prescribed by: RADHA SANTIAGO on 1/27/22 1053





Past Medical-Social-Family Hx


Patient Social History


Tobacco Use?:  Yes


Tobacco type used:  Cigarettes


Smoking Status:  Current Everyday Smoker


Substance use?:  No


Alcohol Use?:  No





Immunizations Up To Date


Influenza Vaccine Up-to-Date:  No; Not Current


First/Initial COVID19 Vaccinat:  3/21


Second COVID19 Vaccination Bo:  4/21





Seasonal Allergies


Seasonal Allergies:  No





Past Medical History


Surgery/Hospitalization HX:  


CHOLECYSTECTOMY, UMBILICAL HERNIA REPAIR, RENAL STONES, APPY,VASECTOMY, T/A, CVA




2019, ANXIETY, PTSD


Surgeries:  Yes (KIDNEY STONES--BASKET REMOVAL, )


Abdominal, Adenoidectomy, Appendectomy, Gallbladder, Tonsillectomy, Vasectomy


Respiratory:  No


Currently Using CPAP:  No


Currently Using BIPAP:  No


Cardiac:  No


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


Stroke


Reproductive Disorders:  No


Genitourinary:  Yes


Kidney Stones


Gastrointestinal:  Yes (CHRONIC GI COMPLAINTS)


Abdominal Hernia, Gall Bladder Disease


Musculoskeletal:  Yes


Chronic Back Pain


Endocrine:  Yes (hypoglycemia)


HEENT:  No


Tonsilitis


Cancer:  No


Psychosocial:  Yes


Anxiety, PTSD


Integumentary:  No


Blood Disorders:  No





Family Medical History


No Pertinent Family Hx





SOCIAL HISTORY:


-ETOH--OCCASIONAL USE


-DRUGS--DENIES USE, BUT UDS HAS BEEN + FOR THC


-SMOKES 1 PPD





PAST SURGICAL HISTORY:


-KIDNEY STONES-BASKET REMOVAL


-TONSILLECTOMY/ADENOIDECTOMY


-VASECTOMY


-CARDIAC CATH 12/15/20 BY :


CONCLUSIONS:


1.  No angiographically significant coronary artery disease.


2.  Normal left ventricular end-diastolic pressure.


3.  Normal left ventricular systolic function with ejection fraction of


55% to


60%.


DISCUSSION AND RECOMMENDATIONS:


Based on results of the study, chest discomfort does not appear to be of


coronary origin.  Risk factor modification is advised.  Have advised to


refrain


from tobacco use.  Other risk factor modification has also been discussed. 


Outpatient followup is advised.





-LAPAROSCOPIC CHOLECYSTECTOMY AND UMBILICAL HERNIA REPAIR 12/30/21 BY DR. PHILIP.





Physical Exam


Vital Signs





Vital Signs - First Documented








 5/25/22





 21:58


 


Pulse 74


 


Resp 18


 


B/P (MAP) 134/87 (103)


 


Pulse Ox 96


 


O2 Delivery Room Air





Capillary Refill :


Height/Weight/BMI


Height: 6'"


Weight: 242lbs. oz. 109.515026hd; 31.00 BMI


Method:Stated





Progress/Results/Core Measures


Results/Orders


Lab Results





Laboratory Tests








Test


 5/25/22


22:24 5/25/22


22:32 5/25/22


22:58 Range/Units


 


 


White Blood Count


 8.1 


 


 


 4.3-11.0


10^3/uL


 


Red Blood Count


 5.07 


 


 


 4.30-5.52


10^6/uL


 


Hemoglobin 16.8    13.3-17.7  g/dL


 


Hematocrit 50    40-54  %


 


Mean Corpuscular Volume 98    80-99  fL


 


Mean Corpuscular Hemoglobin 33    25-34  pg


 


Mean Corpuscular Hemoglobin


Concent 34 


 


 


 32-36  g/dL





 


Red Cell Distribution Width 12.0    10.0-14.5  %


 


Platelet Count


 249 


 


 


 130-400


10^3/uL


 


Mean Platelet Volume 8.5 L   9.0-12.2  fL


 


Immature Granulocyte % (Auto) 0     %


 


Neutrophils (%) (Auto) 56    42-75  %


 


Lymphocytes (%) (Auto) 27    12-44  %


 


Monocytes (%) (Auto) 9    0-12  %


 


Eosinophils (%) (Auto) 7    0-10  %


 


Basophils (%) (Auto) 1    0-10  %


 


Neutrophils # (Auto)


 4.5 


 


 


 1.8-7.8


10^3/uL


 


Lymphocytes # (Auto)


 2.2 


 


 


 1.0-4.0


10^3/uL


 


Monocytes # (Auto)


 0.7 


 


 


 0.0-1.0


10^3/uL


 


Eosinophils # (Auto)


 0.5 H


 


 


 0.0-0.3


10^3/uL


 


Basophils # (Auto)


 0.1 


 


 


 0.0-0.1


10^3/uL


 


Immature Granulocyte # (Auto)


 0.0 


 


 


 0.0-0.1


10^3/uL


 


Sodium Level 140    135-145  MMOL/L


 


Potassium Level 3.2 L   3.6-5.0  MMOL/L


 


Chloride Level 104      MMOL/L


 


Carbon Dioxide Level 23    21-32  MMOL/L


 


Anion Gap 13    5-14  MMOL/L


 


Blood Urea Nitrogen 4 L   7-18  MG/DL


 


Creatinine


 0.96 


 


 


 0.60-1.30


MG/DL


 


Estimat Glomerular Filtration


Rate 100 


 


 


  





 


BUN/Creatinine Ratio 4     


 


Glucose Level 109 H     MG/DL


 


Calcium Level 9.1    8.5-10.1  MG/DL


 


Corrected Calcium 8.9    8.5-10.1  MG/DL


 


Total Bilirubin 0.3    0.1-1.0  MG/DL


 


Aspartate Amino Transf


(AST/SGOT) 17 


 


 


 5-34  U/L





 


Alanine Aminotransferase


(ALT/SGPT) 26 


 


 


 0-55  U/L





 


Alkaline Phosphatase 89      U/L


 


Total Protein 7.0    6.4-8.2  GM/DL


 


Albumin 4.2    3.2-4.5  GM/DL


 


Influenza Type A (RT-PCR)  Not Detected   Not Detecte  


 


Influenza Type B (RT-PCR)  Not Detected   Not Detecte  


 


SARS-CoV-2 RNA (RT-PCR)  Not Detected   Not Detecte  


 


Urine Color   YELLOW   


 


Urine Clarity   CLEAR   


 


Urine pH   6.5  5-9  


 


Urine Specific Gravity   <=1.005  1.016-1.022  


 


Urine Protein   NEGATIVE  NEGATIVE  


 


Urine Glucose (UA)   NEGATIVE  NEGATIVE  


 


Urine Ketones   NEGATIVE  NEGATIVE  


 


Urine Nitrite   NEGATIVE  NEGATIVE  


 


Urine Bilirubin   NEGATIVE  NEGATIVE  


 


Urine Urobilinogen   0.2  < = 1.0  MG/DL


 


Urine Leukocyte Esterase   NEGATIVE  NEGATIVE  


 


Urine RBC (Auto)   NEGATIVE  NEGATIVE  


 


Urine RBC   NONE   /HPF


 


Urine WBC   NONE   /HPF


 


Urine Squamous Epithelial


Cells 


 


 NONE 


  /HPF





 


Urine Renal Epithelial Cells   NONE   /HPF


 


Urine Crystals   NONE   /LPF


 


Urine Bacteria   NEGATIVE   /HPF


 


Urine Casts   NONE   /LPF


 


Urine Mucus   NEGATIVE   /LPF


 


Urine Culture Indicated   NO   








My Orders





Orders - CARMELA ALBERTO APRN


Ua Culture If Indicated (5/25/22 21:54)


Ct Abdomen/Pelvis Wo (5/25/22 22:00)


Cbc With Automated Diff (5/25/22 22:00)


Comprehensive Metabolic Panel (5/25/22 22:00)


Ketorolac Injection (Toradol Injection) (5/25/22 22:00)


Ns Iv 1000 Ml (Sodium Chloride 0.9%) (5/25/22 22:15)


Covid 19 Inhouse Test (5/25/22 22:23)


Influenza A And B By Pcr (5/25/22 22:23)


Abdominal Binder (5/25/22 22:33)





Medications Given in ED





Current Medications








 Medications  Dose


 Ordered  Sig/Duy


 Route  Start Time


 Stop Time Status Last Admin


Dose Admin


 


 Ketorolac


 Tromethamine  30 mg  ONCE  ONCE


 IVP  5/25/22 22:00


 5/25/22 22:01 DC 5/25/22 22:23


30 MG


 


 Sodium Chloride  1,000 ml @ 


 100 mls/hr  Q10H ONCE


 IV  5/25/22 22:15


 5/26/22 08:14  5/25/22 22:33


100 MLS/HR








Vital Signs/I&O











 5/25/22





 21:58


 


Pulse 74


 


Resp 18


 


B/P (MAP) 134/87 (103)


 


Pulse Ox 96


 


O2 Delivery Room Air











Departure


Impression





   Primary Impression:  


   Hernia of abdominal wall


Disposition:  01 HOME, SELF-CARE


Condition:  Improved





Departure-Patient Inst.


Decision time for Depature:  23:01


Referrals:  


[facility] (PCP)


Primary Care Physician








HENOK JULIAN (Family)


Primary Care Physician


Patient Instructions:  Abdominal Hernia (DC)





Add. Discharge Instructions:  


Plan: 


1. Follow up with Dr. Philip next week. Call office to schedule appointment. 


2. Use abdominal binder to help support abdomen when you are up and about. 


3. Avoid any heavy lifting over 10 pounds until you follow up with your surgeon.




4. Return for any new, concerning, or worsening symptoms. 





All discharge instructions reviewed with patient and/or family. Voiced 

understanding.


Work/School Note:  Work Release Form   Date Seen in the Emergency Department:  

May 25, 2022


   Return to Work:  May 27, 2022


      Other Restrictions Listed Below:  No lifting over 10 pounds











CARMELA ALBERTO APRN           May 25, 2022 22:01

## 2022-06-14 ENCOUNTER — HOSPITAL ENCOUNTER (EMERGENCY)
Dept: HOSPITAL 75 - ER | Age: 42
Discharge: HOME | End: 2022-06-14
Payer: MEDICARE

## 2022-06-14 VITALS — BODY MASS INDEX: 33.02 KG/M2 | HEIGHT: 72.83 IN | WEIGHT: 249.12 LBS

## 2022-06-14 VITALS — DIASTOLIC BLOOD PRESSURE: 80 MMHG | SYSTOLIC BLOOD PRESSURE: 134 MMHG

## 2022-06-14 DIAGNOSIS — K42.9: ICD-10-CM

## 2022-06-14 DIAGNOSIS — S93.402A: Primary | ICD-10-CM
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
## 2022-06-14 DIAGNOSIS — Z90.49: ICD-10-CM

## 2022-06-14 DIAGNOSIS — F17.210: ICD-10-CM

## 2022-06-14 PROCEDURE — 73610 X-RAY EXAM OF ANKLE: CPT

## 2022-06-14 PROCEDURE — 73590 X-RAY EXAM OF LOWER LEG: CPT

## 2022-06-14 NOTE — DIAGNOSTIC IMAGING REPORT
EXAM: TIBIA/FIBULA, LEFT, 2 VIEWS



INDICATION: Left lower extremity pain.



COMPARISON: Left ankle radiographs also performed today.



FINDINGS: No fracture or malalignment. Soft tissue shadows are

unremarkable.



IMPRESSION: Negative left tibia and fibula radiographs.



Dictated by: 



  Dictated on workstation # [id number]

## 2022-06-14 NOTE — ED LOWER EXTREMITY
General


Chief Complaint:  Lower Extremity


Stated Complaint:  L ANKLE INJ/STOMACH PAIN


Source:  patient


Exam Limitations:  no limitations





History of Present Illness


Date Seen by Provider:  Jun 14, 2022





Allergies and Home Medications


Allergies


Coded Allergies:  


     Sulfa (Sulfonamide Antibiotics) (Verified  Allergy, Severe, HIVES, 12/30/ 21)


     levofloxacin (Verified  Allergy, Unknown, 12/30/21)


     doxycycline (Verified  Adverse Reaction, Mild, Vomiting, 12/30/21)





Patient Home Medication List


Cefdinir (Cefdinir) 300 Mg Capsule, 300 MG PO BID


   Prescribed by: SOHAM JAMISON on 1/2/22 0236


Clindamycin HCl (Clindamycin HCl) 300 Mg Capsule, 300 MG PO QID


   Prescribed by: SOHAM JAMISON on 1/2/22 0236


Cyclobenzaprine HCl (Cyclobenzaprine HCl) 10 Mg Tablet, 10 MG PO Q8H PRN for 

SPASMS


   Prescribed by: RADHA SANTIAGO on 1/27/22 1053


Docusate Sodium (Colace) 100 Mg Capsule, 100 MG PO BID


   Prescribed by: NANY PHILIP on 12/30/21 1052


Hydrocodone/Acetaminophen (Hydrocodone-Acetamin 5-325 mg) 1 Each Tablet, 1 EACH 

PO Q4H PRN for PAIN-MODERATE (5-7)


   Prescribed by: NANY PHILIP on 12/30/21 1054


Hydrocodone/Acetaminophen (Hydrocodone-Acetamin 5-325 mg) 1 Each Tablet, 1 TAB 

PO Q4H PRN for PAIN-MODERATE (5-7)


   Prescribed by: BRYAN AGUILAR on 1/11/22 1539


Naproxen (Naprosyn) 500 Mg Tablet, 500 MG PO BID


   Prescribed by: RADHA SANTIAGO on 1/27/22 1053


Prednisone (Prednisone) 20 Mg Tab, 40 MG PO DAILY


   Prescribed by: RADHA SANTIAGO on 1/27/22 1053





Past Medical-Social-Family Hx


Immunizations Up To Date


First/Initial COVID19 Vaccinat:  3/21


Second COVID19 Vaccination Bo:  4/21





Seasonal Allergies


Seasonal Allergies:  No





Past Medical History


Surgery/Hospitalization HX:  


CHOLECYSTECTOMY, UMBILICAL HERNIA REPAIR, RENAL STONES, APPY,VASECTOMY, T/A, CVA




2019, ANXIETY, PTSD


Surgeries:  Yes (KIDNEY STONES--BASKET REMOVAL, )


Abdominal, Adenoidectomy, Appendectomy, Gallbladder, Tonsillectomy, Vasectomy


Respiratory:  No


Currently Using CPAP:  No


Currently Using BIPAP:  No


Cardiac:  No


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


Stroke


Reproductive Disorders:  No


Genitourinary:  Yes


Kidney Stones


Gastrointestinal:  Yes (CHRONIC GI COMPLAINTS)


Abdominal Hernia, Gall Bladder Disease


Musculoskeletal:  Yes


Chronic Back Pain


Endocrine:  Yes (hypoglycemia)


HEENT:  No


Tonsilitis


Cancer:  No


Psychosocial:  Yes


Anxiety, PTSD


Integumentary:  No


Blood Disorders:  No





Family Medical History


No Pertinent Family Hx





SOCIAL HISTORY:


-ETOH--OCCASIONAL USE


-DRUGS--DENIES USE, BUT UDS HAS BEEN + FOR THC


-SMOKES 1 PPD





PAST SURGICAL HISTORY:


-KIDNEY STONES-BASKET REMOVAL


-TONSILLECTOMY/ADENOIDECTOMY


-VASECTOMY


-CARDIAC CATH 12/15/20 BY :


CONCLUSIONS:


1.  No angiographically significant coronary artery disease.


2.  Normal left ventricular end-diastolic pressure.


3.  Normal left ventricular systolic function with ejection fraction of


55% to


60%.


DISCUSSION AND RECOMMENDATIONS:


Based on results of the study, chest discomfort does not appear to be of


coronary origin.  Risk factor modification is advised.  Have advised to


refrain


from tobacco use.  Other risk factor modification has also been discussed. 


Outpatient followup is advised.





-LAPAROSCOPIC CHOLECYSTECTOMY AND UMBILICAL HERNIA REPAIR 12/30/21 BY DR. PHILIP.





Physical Exam


Vital Signs


Capillary Refill :


Height, Weight, BMI


Height: 6'"


Weight: 242lbs. oz. 109.183534so; 33.00 BMI


Method:Stated





Progress/Results/Core Measures


Results/Orders


My Orders





Orders - SOHAM OCAMPO MD


Tibia/Fibula, Left, 2 Views (6/14/22 17:34)


Crutches (6/14/22 18:04)


Ketorolac Injection (Toradol Injection) (6/14/22 18:15)


Ondansetron  Oral Dissolve Tab (Zofran (6/14/22 18:15)








Departure


Impression





   Primary Impression:  


   Left ankle sprain


   Qualified Codes:  S93.402A - Sprain of unspecified ligament of left ankle, 

   initial encounter


   Additional Impressions:  


   Umbilical hernia


   Qualified Codes:  K42.9 - Umbilical hernia without obstruction or gangrene


   Nausea


Disposition:  01 HOME, SELF-CARE


Condition:  Against Medical Advice





Departure-Patient Inst.


Decision time for Depature:  18:13


Referrals:  


[facility] (PCP)


Primary Care Physician








HENOK JULIAN (Family)


Primary Care Physician


Patient Instructions:  Umbilical Hernia, Adult





Add. Discharge Instructions:  


You may treat pain and swelling in your ankle with rest, compressive wrapping, 

elevation, and 20-minute intervals of icing.  Gradually increase level of 

activity as pain allows.  Obtain a supportive Velcro or lace up brace from the 

store and use for the next 6 weeks whenever active.


You may use Tylenol (acetaminophen) up to 1000 mg every 6 hours as needed for 

more minor pain.  For more intense pain use hydrocodone as prescribed.  This is 

for pain related to the ankle or your hernia.


Use crutches to walk as necessary but gradually increase weightbearing as 

tolerated.


Avoid excessive bending, heavy lifting, and eating large meals as much as 

possible.  These things may irritate your hernia.


Zofran (ondansetron) may be used as prescribed for nausea.


Return to care if you have worsening symptoms despite following these 

instructions.





All discharge instructions reviewed with patient and/or family. Voiced 

understanding.


Scripts


Hydrocodone/Acetaminophen (Hydrocodone-Acetamin 5-325 mg) 5 Mg-325 Mg Tablet


1 TAB PO Q4H PRN for PAIN-MODERATE (5-7), #10 TAB


   Prov: SOHAM OCAMPO MD         6/14/22 


Ondansetron (Ondansetron Odt) 4 Mg Tab.rapdis


4 MG SL Q4H PRN for NAUSEA/VOMITING, #10 TAB


   Prov: SOHAM OCAMPO MD         6/14/22


Work/School Note:  Work Release Form   Date Seen in the Emergency Department:  

Jun 14, 2022


   Return to Work:  Jun 16, 2022


      Other Restrictions Listed Below:  May need to use crutches.  Should use 

ankle brace for 6 weeks.











SOHAM OCAMPO MD        Jun 14, 2022 18:12

## 2022-06-14 NOTE — DIAGNOSTIC IMAGING REPORT
EXAM: ANKLE, LEFT, 3 VIEWS



INDICATION: Left ankle pain.



COMPARISON: None.



FINDINGS: No fracture or malalignment. Soft tissue shadows are

unremarkable.



IMPRESSION: Negative left ankle radiographs.



Dictated by: 



  Dictated on workstation # [id number]

## 2022-06-23 ENCOUNTER — HOSPITAL ENCOUNTER (OUTPATIENT)
Dept: HOSPITAL 75 - PREOP | Age: 42
LOS: 1 days | Discharge: HOME | End: 2022-06-24
Attending: SURGERY
Payer: MEDICARE

## 2022-06-23 VITALS — WEIGHT: 246.92 LBS | BODY MASS INDEX: 33.44 KG/M2 | HEIGHT: 72.01 IN

## 2022-06-23 DIAGNOSIS — Z01.818: Primary | ICD-10-CM

## 2022-06-30 ENCOUNTER — HOSPITAL ENCOUNTER (OUTPATIENT)
Dept: HOSPITAL 75 - SDC | Age: 42
Discharge: HOME | End: 2022-06-30
Attending: SURGERY
Payer: MEDICARE

## 2022-06-30 VITALS — BODY MASS INDEX: 33.44 KG/M2 | HEIGHT: 72.05 IN | WEIGHT: 246.92 LBS

## 2022-06-30 VITALS — DIASTOLIC BLOOD PRESSURE: 77 MMHG | SYSTOLIC BLOOD PRESSURE: 124 MMHG

## 2022-06-30 VITALS — DIASTOLIC BLOOD PRESSURE: 69 MMHG | SYSTOLIC BLOOD PRESSURE: 111 MMHG

## 2022-06-30 VITALS — SYSTOLIC BLOOD PRESSURE: 148 MMHG | DIASTOLIC BLOOD PRESSURE: 90 MMHG

## 2022-06-30 VITALS — SYSTOLIC BLOOD PRESSURE: 124 MMHG | DIASTOLIC BLOOD PRESSURE: 77 MMHG

## 2022-06-30 VITALS — DIASTOLIC BLOOD PRESSURE: 82 MMHG | SYSTOLIC BLOOD PRESSURE: 125 MMHG

## 2022-06-30 VITALS — DIASTOLIC BLOOD PRESSURE: 75 MMHG | SYSTOLIC BLOOD PRESSURE: 104 MMHG

## 2022-06-30 VITALS — SYSTOLIC BLOOD PRESSURE: 137 MMHG | DIASTOLIC BLOOD PRESSURE: 97 MMHG

## 2022-06-30 VITALS — DIASTOLIC BLOOD PRESSURE: 91 MMHG | SYSTOLIC BLOOD PRESSURE: 126 MMHG

## 2022-06-30 VITALS — SYSTOLIC BLOOD PRESSURE: 123 MMHG | DIASTOLIC BLOOD PRESSURE: 77 MMHG

## 2022-06-30 VITALS — SYSTOLIC BLOOD PRESSURE: 144 MMHG | DIASTOLIC BLOOD PRESSURE: 92 MMHG

## 2022-06-30 VITALS — SYSTOLIC BLOOD PRESSURE: 146 MMHG | DIASTOLIC BLOOD PRESSURE: 98 MMHG

## 2022-06-30 DIAGNOSIS — K43.2: Primary | ICD-10-CM

## 2022-06-30 DIAGNOSIS — F17.210: ICD-10-CM

## 2022-06-30 DIAGNOSIS — E66.9: ICD-10-CM

## 2022-06-30 PROCEDURE — 87081 CULTURE SCREEN ONLY: CPT

## 2022-06-30 PROCEDURE — 86701 HIV-1ANTIBODY: CPT

## 2022-06-30 PROCEDURE — 36415 COLL VENOUS BLD VENIPUNCTURE: CPT

## 2022-06-30 PROCEDURE — 87389 HIV-1 AG W/HIV-1&-2 AB AG IA: CPT

## 2022-06-30 PROCEDURE — 49654: CPT

## 2022-06-30 PROCEDURE — 86803 HEPATITIS C AB TEST: CPT

## 2022-06-30 RX ADMIN — SODIUM CHLORIDE, SODIUM LACTATE, POTASSIUM CHLORIDE, AND CALCIUM CHLORIDE SCH MLS/HR: 600; 310; 30; 20 INJECTION, SOLUTION INTRAVENOUS at 08:58

## 2022-06-30 RX ADMIN — SODIUM CHLORIDE, SODIUM LACTATE, POTASSIUM CHLORIDE, AND CALCIUM CHLORIDE SCH MLS/HR: 600; 310; 30; 20 INJECTION, SOLUTION INTRAVENOUS at 11:05

## 2022-06-30 NOTE — DISCHARGE INST-SIMPLE/STANDARD
Discharge Inst-Standard


Discharge Medications


New, Converted or Re-Newed RX:  Transmitted to Pharmacy





Patient Instructions/Follow Up


Plan of Care/Instructions/FU:  


2 weeks Kem


Activity as Tolerated:  No


Discharge Diet:  Regular Diet


Other Inst to Patient


Follow up Appt:


Make appointment for 2 week.





Instructions:


No lifting greater than 10 pounds.


No strenuous activity. 


May shower in 24 hours, no tub bath or soaking.


Use incentive spirometer at home as directed.


No Smoking





Skin/Wound Care:


You have special glue over your incision that will fall off on it's own. 





Symptoms to Report:


Appetite Changes, Extremity Discoloration, Numbness/Tingling, Swelling 

Increased, Bleeding Excessive, Eyesight Changes, Pain Increased, Urine Color 

Change, Constipation(Persistent), Fever over 101 degree F, Pain/Pressure in 

chest, Urinating Difficulty, Cough Up/Vomit Blood, Heart Beat Irreg/Pounding, 

Pain/Pressure in jaw, Vaginal Bleeding Increase, Cramps in feet or legs, 

Lightheadedness, Pain/Pressure in shoulder, Diarrhea(Persistent), Memory Changes

Suddenly, Questions/Concerns, Weight gain consecutive days, Dizziness/Fainting, 

Nausea/Vomiting, Shortness of Breath, Weight gain over 2 pounds








If questions or concerns contact your physician 


Or seek help at emergency department.











NANY PHILIP DO              Jun 30, 2022 12:13

## 2022-06-30 NOTE — OPERATIVE REPORT
DATE OF SERVICE:  06/30/2022



PREOPERATIVE DIAGNOSIS:

Incisional hernia.



POSTOPERATIVE DIAGNOSIS:

Robotic/laparoscopic incisional hernia repair with 6-inch Echo Ventralight mesh.



SURGEON:

Nany Brownlee DO



ASSISTANT:

Dr. Copeland, assisted in retraction, dissection and closure.



ANESTHESIA:

General.



ESTIMATED BLOOD LOSS:

Minimal.



COMPLICATIONS:

None.



INDICATIONS:

The patient is a 42-year-old male with previous surgery, which had incisional

hernias continued to increase in size and cause discomfort.  He understands

risks and benefits of procedure and wished to proceed.  Consent was signed in

the chart.



DESCRIPTION OF PROCEDURE:

The patient was taken to the operating suite, was prepped and draped in sterile

fashion.  Surgical pause was performed.  Left upper quadrant incision was made

after local anesthetic was infiltrated.  A #11 blade scalpel was used to make a

skin incision.  Cautery was used to dissect down through the subcutaneous

tissues.  The anterior fascia was scored, the muscle was divided.  The posterior

fascia was divided and the key trocar was inserted.  Pneumoperitoneum was

achieved.  Under direct visualization of the laparoscope, a 5 mm trocar was

placed in the left lower quadrant and one in the lateral aspect.  These were 8

mm trocars.  The robot was then docked.  The incisional hernia was visualized

approximately 4 cm in diameter.  Using a 0 V-Loc permanent suture the defect was

then closed. This was then cut and the needle was removed.  A stab incision was

made at the hernia defect site. An Echo Ventralight 6-inch mesh was then

inserted and pulled up.  A 5 mm trocar was then placed in the right side of the

abdomen.  The robot was then undocked.  The SecureStrap Tacker was then used to

circumferentially tack the mesh into place.  An inner crown was also created

after the balloon was removed.  Adequate coverage was present.  The abdomen was

then desufflated, the trocars were removed.  The fascial defect of the left

upper quadrant trocar was closed with 0 Vicryl.  Skin was then closed using 4-0

Monocryl in subcuticular fashion.  The areas were washed and dried and Skin

Affix was placed over the incisions.  The patient tolerated procedure well

without any complications.  He was taken to recovery room in stable condition.





Job ID: [id number]

DocumentID: [id number]

Dictated Date:  06/30/2022 16:29:24

Transcription Date: 06/30/2022 22:30:33

Dictated By: NANY BROWNLEE DO

## 2022-06-30 NOTE — ANESTHESIA-GENERAL POST-OP
General


Patient Condition


Mental Status/LOC:  Same as Preop


Cardiovascular:  Satisfactory


Nausea/Vomiting:  Absent


Respiratory:  Satisfactory


Pain:  Controlled


Complications:  Absent





Post Op Complications


Complications


None





Follow Up Care/Instructions


Patient Instructions


None needed.





Anesthesia/Patient Condition


Patient Condition


Patient is doing well, no complaints, stable vital signs, no apparent adverse 

anesthesia problems.   


No complications reported per nursing.











PRICILA TORRES CRNA            Jun 30, 2022 12:15

## 2022-10-04 ENCOUNTER — HOSPITAL ENCOUNTER (EMERGENCY)
Dept: HOSPITAL 75 - ER | Age: 42
Discharge: HOME | End: 2022-10-04
Payer: MEDICARE

## 2022-10-04 VITALS — SYSTOLIC BLOOD PRESSURE: 142 MMHG | DIASTOLIC BLOOD PRESSURE: 86 MMHG

## 2022-10-04 VITALS — BODY MASS INDEX: 33.44 KG/M2 | WEIGHT: 246.92 LBS | HEIGHT: 71.97 IN

## 2022-10-04 DIAGNOSIS — Z20.822: ICD-10-CM

## 2022-10-04 DIAGNOSIS — F17.210: ICD-10-CM

## 2022-10-04 DIAGNOSIS — R07.89: Primary | ICD-10-CM

## 2022-10-04 LAB
ALBUMIN SERPL-MCNC: 4.3 GM/DL (ref 3.2–4.5)
ALP SERPL-CCNC: 106 U/L (ref 40–136)
ALT SERPL-CCNC: 25 U/L (ref 0–55)
APTT BLD: 31 SEC (ref 24–35)
BASOPHILS # BLD AUTO: 0.1 10^3/UL (ref 0–0.1)
BASOPHILS NFR BLD AUTO: 1 % (ref 0–10)
BILIRUB SERPL-MCNC: 0.4 MG/DL (ref 0.1–1)
BUN/CREAT SERPL: 4
CALCIUM SERPL-MCNC: 9.1 MG/DL (ref 8.5–10.1)
CHLORIDE SERPL-SCNC: 103 MMOL/L (ref 98–107)
CK MB SERPL-MCNC: 1.4 NG/ML (ref ?–6.6)
CK SERPL-CCNC: 167 U/L (ref 30–200)
CO2 SERPL-SCNC: 24 MMOL/L (ref 21–32)
CREAT SERPL-MCNC: 0.93 MG/DL (ref 0.6–1.3)
EOSINOPHIL # BLD AUTO: 0.3 10^3/UL (ref 0–0.3)
EOSINOPHIL NFR BLD AUTO: 3 % (ref 0–10)
GFR SERPLBLD BASED ON 1.73 SQ M-ARVRAT: 104 ML/MIN
GLUCOSE SERPL-MCNC: 94 MG/DL (ref 70–105)
HCT VFR BLD CALC: 50 % (ref 40–54)
HGB BLD-MCNC: 16.8 G/DL (ref 13.3–17.7)
INR PPP: 1 (ref 0.8–1.4)
LIPASE SERPL-CCNC: 14 U/L (ref 8–78)
LYMPHOCYTES # BLD AUTO: 1.4 10^3/UL (ref 1–4)
LYMPHOCYTES NFR BLD AUTO: 13 % (ref 12–44)
MAGNESIUM SERPL-MCNC: 2 MG/DL (ref 1.6–2.4)
MANUAL DIFFERENTIAL PERFORMED BLD QL: NO
MCH RBC QN AUTO: 33 PG (ref 25–34)
MCHC RBC AUTO-ENTMCNC: 34 G/DL (ref 32–36)
MCV RBC AUTO: 97 FL (ref 80–99)
MONOCYTES # BLD AUTO: 0.7 10^3/UL (ref 0–1)
MONOCYTES NFR BLD AUTO: 6 % (ref 0–12)
NEUTROPHILS # BLD AUTO: 8.2 10^3/UL (ref 1.8–7.8)
NEUTROPHILS NFR BLD AUTO: 77 % (ref 42–75)
PLATELET # BLD: 277 10^3/UL (ref 130–400)
PMV BLD AUTO: 8.5 FL (ref 9–12.2)
POTASSIUM SERPL-SCNC: 3.2 MMOL/L (ref 3.6–5)
PROT SERPL-MCNC: 7.4 GM/DL (ref 6.4–8.2)
PROTHROMBIN TIME: 13.5 SEC (ref 12.2–14.7)
SODIUM SERPL-SCNC: 138 MMOL/L (ref 135–145)
WBC # BLD AUTO: 10.7 10^3/UL (ref 4.3–11)

## 2022-10-04 PROCEDURE — 86141 C-REACTIVE PROTEIN HS: CPT

## 2022-10-04 PROCEDURE — 80053 COMPREHEN METABOLIC PANEL: CPT

## 2022-10-04 PROCEDURE — 82553 CREATINE MB FRACTION: CPT

## 2022-10-04 PROCEDURE — 83874 ASSAY OF MYOGLOBIN: CPT

## 2022-10-04 PROCEDURE — 82550 ASSAY OF CK (CPK): CPT

## 2022-10-04 PROCEDURE — 85610 PROTHROMBIN TIME: CPT

## 2022-10-04 PROCEDURE — 83690 ASSAY OF LIPASE: CPT

## 2022-10-04 PROCEDURE — 85379 FIBRIN DEGRADATION QUANT: CPT

## 2022-10-04 PROCEDURE — 83735 ASSAY OF MAGNESIUM: CPT

## 2022-10-04 PROCEDURE — 93041 RHYTHM ECG TRACING: CPT

## 2022-10-04 PROCEDURE — 85025 COMPLETE CBC W/AUTO DIFF WBC: CPT

## 2022-10-04 PROCEDURE — 87636 SARSCOV2 & INF A&B AMP PRB: CPT

## 2022-10-04 PROCEDURE — 71045 X-RAY EXAM CHEST 1 VIEW: CPT

## 2022-10-04 PROCEDURE — 83880 ASSAY OF NATRIURETIC PEPTIDE: CPT

## 2022-10-04 PROCEDURE — 96375 TX/PRO/DX INJ NEW DRUG ADDON: CPT

## 2022-10-04 PROCEDURE — 84484 ASSAY OF TROPONIN QUANT: CPT

## 2022-10-04 PROCEDURE — 85730 THROMBOPLASTIN TIME PARTIAL: CPT

## 2022-10-04 PROCEDURE — 93005 ELECTROCARDIOGRAM TRACING: CPT

## 2022-10-04 PROCEDURE — 96374 THER/PROPH/DIAG INJ IV PUSH: CPT

## 2022-10-04 PROCEDURE — 36415 COLL VENOUS BLD VENIPUNCTURE: CPT

## 2022-10-04 RX ADMIN — NITROGLYCERIN PRN MG: 0.4 TABLET SUBLINGUAL at 13:18

## 2022-10-04 RX ADMIN — NITROGLYCERIN PRN MG: 0.4 TABLET SUBLINGUAL at 13:32

## 2022-10-04 NOTE — ED CARDIAC GENERAL
History of Present Illness


General


Chief Complaint:  Chest Pain


Stated Complaint:  CHEST PAIN


Nursing Triage Note:  


CHEST PAIN BEGAN THIS AM AROUND 0800 WHEN HE WAS AT WORK IN [employer], PATIENT 


STATES HE HAS ALSO HAD LEFT ARM NUMBNESS, DENIES SOB, NAUSEA OR VOMITING. STATES




HE WAS WORKED UP FOR CHEST PAIN YESTERDAY AT [facility] IN [place] AND WAS FOUND TO BE




OKAY AT THAT TIME.


Source:  patient


Exam Limitations:  no limitations


 (CARMELA ALBERTO)





History of Present Illness


Date Seen by Provider:  Oct 4, 2022


Time Seen by Provider:  11:42


Initial Comments


This is a 43 yo male with history of non cardiac chest pain who presented to the

ER via POV with c/o chest pain that started yesterday and has persisted today. 

He was evaluated and treated at [facility] for symptoms and discharged home 

around 1800 in the evening. States 1/2 way home to [facility] his chest pain 

returned. Describes as sharp pain and "someone sitting on my chest". Pain is 

constant. No alleviating or aggravating factors. States pain will move into his 

left jaw and arm. He has follow up scheduled with [facility] on . No syncope or near syncope, no nausea or vomiting. No fever, cough, or shor

tness of breath. He smokes cigarettes intermittently and drinks alcohol 

occasionally. Denies illicit drug use.


ASA po PTA:  No


 (CAMRELA ALBERTO)





Allergies and Home Medications


Allergies


Coded Allergies:  


     Sulfa (Sulfonamide Antibiotics) (Verified  Allergy, Severe, HIVES, 

21)


     levofloxacin (Verified  Allergy, Unknown, 21)


     doxycycline (Verified  Adverse Reaction, Mild, Vomiting, 21)





Patient Home Medication List


Home Medication List Reviewed:  Yes


 (CARMELA ALBERTO)


Aspirin (Low Dose Aspirin EC) 81 Mg Tablet., 81 MG PO UD, (Reported)


   Entered as Reported by: RUSSELL GARCÍA on 22 0822


Docusate Sodium (Colace) 100 Mg Capsule, 100 MG PO BID


   Prescribed by: NANY PHILIP on 22 1210


Famotidine (Pepcid) 20 Mg Tablet, 20 MG PO BID


   Prescribed by: CARMELA ALBERTO on 10/4/22 1537


Hydrocodone/Acetaminophen (Hydrocodone-Acetamin 5-325 mg) 5 Mg-325 Mg Tablet, 1 

EACH PO Q4H PRN for PAIN-MODERATE (5-7)


   Prescribed by: NANY PHILIP on 22 1213





Review of Systems


Review of Systems


Constitutional:  No chills, No fever, No malaise


EENTM:  No Symptoms Reported


Respiratory:  No Symptoms Reported


Cardiovascular:  Chest Pain; Denies Lightheadedness, Denies Syncope


Gastrointestinal:  No Symptoms Reported


Genitourinary:  No Symptoms Reported


Musculoskeletal:  no symptoms reported


Skin:  no symptoms reported


Psychiatric/Neurological:  No Symptoms Reported


Endocrine:  No Symptoms Reported


Hematologic/Lymphatic:  No Symptoms Reported (CARMELA ALBERTO)





Past Medical-Social-Family Hx


Patient Social History


Tobacco Use?:  Yes


Tobacco type used:  Cigarettes


Use of E-Cig and/or Vaping dev:  No


Substance use?:  No


Alcohol Use?:  Yes


Alcohol Frequency:  Once in a while


 (CARMELA ALBERTO)





Immunizations Up To Date


Influenza Vaccine Up-to-Date:  Yes; Up-to-Date


First/Initial COVID19 Vaccinat:  3/21


Second COVID19 Vaccination Bo:  


Third COVID19 Vaccination Date:  3/21


 (CARMELA ALBERTO APRN)





Seasonal Allergies


Seasonal Allergies:  No


 (CARMELA ALBERTO)





Past Medical History


Surgery/Hospitalization HX:  


CHOLECYSTECTOMY, UMBILICAL HERNIA REPAIR, RENAL STONES, APPY,VASECTOMY, T/A, CVA




2019, ANXIETY, PTSD


Surgeries:  Yes (KIDNEY STONES--BASKET REMOVAL, )


Abdominal, Adenoidectomy, Gallbladder, Tonsillectomy, Vasectomy


Respiratory:  No


Currently Using CPAP:  No


Currently Using BIPAP:  No


Cardiac:  No


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


Stroke


Reproductive Disorders:  No


Genitourinary:  Yes


Kidney Stones


Gastrointestinal:  Yes (CHRONIC GI COMPLAINTS, umbilical hernia)


Abdominal Hernia, Gall Bladder Disease


Musculoskeletal:  Yes


Chronic Back Pain


Endocrine:  Yes (hypoglycemia)


HEENT:  No


Tonsilitis


Cancer:  No


Psychosocial:  Yes


Anxiety, PTSD


Integumentary:  No


Blood Disorders:  No


 (CARMELA ALBERTO)





Family Medical History


No Pertinent Family Hx





SOCIAL HISTORY:


-ETOH--OCCASIONAL USE


-DRUGS--DENIES USE, BUT UDS HAS BEEN + FOR THC


-SMOKES 1 PPD





PAST SURGICAL HISTORY:


-KIDNEY STONES-BASKET REMOVAL


-TONSILLECTOMY/ADENOIDECTOMY


-VASECTOMY


-CARDIAC CATH 12/15/20 BY :


CONCLUSIONS:


1.  No angiographically significant coronary artery disease.


2.  Normal left ventricular end-diastolic pressure.


3.  Normal left ventricular systolic function with ejection fraction of


55% to


60%.


DISCUSSION AND RECOMMENDATIONS:


Based on results of the study, chest discomfort does not appear to be of


coronary origin.  Risk factor modification is advised.  Have advised to


refrain


from tobacco use.  Other risk factor modification has also been discussed. 


Outpatient followup is advised.





-LAPAROSCOPIC CHOLECYSTECTOMY AND UMBILICAL HERNIA REPAIR 21 BY DR. PHILIP.


 (CARMELA ALBERTO)





Physical Exam


Vital Signs





Vital Signs - First Documented








 10/4/22





 11:30


 


Temp 36.2


 


Pulse 62


 


Resp 18


 


B/P (MAP) 150/90 (110)


 


Pulse Ox 99


 


O2 Delivery Room Air








 (SOHAM OCAMPO MD)


Vital Signs


Capillary Refill : Less Than 3 Seconds 


 (CARMELA ALBERTO)


Height, Weight, BMI


Height: 6'"


Weight: 242lbs. oz. 109.455112zc; 33.00 BMI


Method:Stated


General Appearance:  No Apparent Distress, WD/WN


HEENT:  PERRL/EOMI, Normal ENT Inspection, Pharynx Normal, Moist Mucous 

Membranes


Neck:  Full Range of Motion, Normal Inspection, Non Tender


Respiratory:  Chest Non Tender, Lungs Clear, Normal Breath Sounds, No Accessory 

Muscle Use, No Respiratory Distress


Cardiovascular:  Regular Rate, Rhythm, No Edema, No Murmur, Normal Peripheral 

Pulses


Gastrointestinal:  Normal Bowel Sounds, No Organomegaly, Non Tender, Soft


Extremity:  Normal Capillary Refill, Normal Inspection, Normal Range of Motion


Neurologic/Psychiatric:  Alert, Oriented x3, No Motor/Sensory Deficits, Normal 

Mood/Affect, CNs II-XII Norm as Tested


Skin:  Normal Color, Warm/Dry (CARMELA ALBERTO)





Progress/Results/Core Measures


Results/Orders


Lab Results





Laboratory Tests








Test


 10/4/22


11:39 10/4/22


13:00 10/4/22


14:53 Range/Units


 


 


White Blood Count


 10.7 


 


 


 4.3-11.0


10^3/uL


 


Red Blood Count


 5.15 


 


 


 4.30-5.52


10^6/uL


 


Hemoglobin 16.8    13.3-17.7  g/dL


 


Hematocrit 50    40-54  %


 


Mean Corpuscular Volume 97    80-99  fL


 


Mean Corpuscular Hemoglobin 33    25-34  pg


 


Mean Corpuscular Hemoglobin


Concent 34 


 


 


 32-36  g/dL





 


Red Cell Distribution Width 13.6    10.0-14.5  %


 


Platelet Count


 277 


 


 


 130-400


10^3/uL


 


Mean Platelet Volume 8.5 L   9.0-12.2  fL


 


Immature Granulocyte % (Auto) 0     %


 


Neutrophils (%) (Auto) 77 H   42-75  %


 


Lymphocytes (%) (Auto) 13    12-44  %


 


Monocytes (%) (Auto) 6    0-12  %


 


Eosinophils (%) (Auto) 3    0-10  %


 


Basophils (%) (Auto) 1    0-10  %


 


Neutrophils # (Auto)


 8.2 H


 


 


 1.8-7.8


10^3/uL


 


Lymphocytes # (Auto)


 1.4 


 


 


 1.0-4.0


10^3/uL


 


Monocytes # (Auto)


 0.7 


 


 


 0.0-1.0


10^3/uL


 


Eosinophils # (Auto)


 0.3 


 


 


 0.0-0.3


10^3/uL


 


Basophils # (Auto)


 0.1 


 


 


 0.0-0.1


10^3/uL


 


Immature Granulocyte # (Auto)


 0.0 


 


 


 0.0-0.1


10^3/uL


 


Prothrombin Time 13.5    12.2-14.7  SEC


 


INR Comment 1.0    0.8-1.4  


 


Activated Partial


Thromboplast Time 31 


 


 


 24-35  SEC





 


D-Dimer


 0.41 


 


 


 0.00-0.49


UG/ML


 


Sodium Level 138    135-145  MMOL/L


 


Potassium Level 3.2 L   3.6-5.0  MMOL/L


 


Chloride Level 103      MMOL/L


 


Carbon Dioxide Level 24    21-32  MMOL/L


 


Anion Gap 11    5-14  MMOL/L


 


Blood Urea Nitrogen 4 L   7-18  MG/DL


 


Creatinine


 0.93 


 


 


 0.60-1.30


MG/DL


 


Estimat Glomerular Filtration


Rate 104 


 


 


  





 


BUN/Creatinine Ratio 4     


 


Glucose Level 94      MG/DL


 


Calcium Level 9.1    8.5-10.1  MG/DL


 


Corrected Calcium 8.9    8.5-10.1  MG/DL


 


Magnesium Level 2.0    1.6-2.4  MG/DL


 


Total Bilirubin 0.4    0.1-1.0  MG/DL


 


Aspartate Amino Transf


(AST/SGOT) 20 


 


 


 5-34  U/L





 


Alanine Aminotransferase


(ALT/SGPT) 25 


 


 


 0-55  U/L





 


Alkaline Phosphatase 106      U/L


 


Total Creatine Kinase 167      U/L


 


Creatine Kinase MB 1.4    <6.6  NG/ML


 


Myoglobin


 60.8 


 


 


 10.0-92.0


NG/ML


 


Troponin I < 0.028   < 0.028  <0.028  NG/ML


 


C-Reactive Protein High


Sensitivity 0.49 


 


 


 0.00-0.50


MG/DL


 


B-Type Natriuretic Peptide 36.1    <100.0  PG/ML


 


Total Protein 7.4    6.4-8.2  GM/DL


 


Albumin 4.3    3.2-4.5  GM/DL


 


Lipase 14    8-78  U/L


 


Influenza Type A (RT-PCR)  Not Detected   Not Detecte  


 


Influenza Type B (RT-PCR)  Not Detected   Not Detecte  


 


SARS-CoV-2 RNA (RT-PCR)  Not Detected   Not Detecte  





 (SOHAM OCAMPO MD)


My Orders





Orders - SOHAM OCAMPO MD


Ekg Tracing (10/4/22 11:34)


 (SOHAM OCAMPO MD)


Medications Given in ED





Current Medications








 Medications  Dose


 Ordered  Sig/Duy


 Route  Start Time


 Stop Time Status Last Admin


Dose Admin


 


 Al Hydrox/Mg


 Hydrox/Simethicone  30 ml  ONCE  ONCE


 PO  10/4/22 15:00


 10/4/22 15:01 DC 10/4/22 15:34


30 ML


 


 Aspirin  324 mg  ONCE  ONCE


 PO  10/4/22 11:45


 10/4/22 11:46 DC 10/4/22 11:53


324 MG


 


 Fentanyl Citrate  50 mcg  ONCE  ONCE


 IVP  10/4/22 12:45


 10/4/22 12:46 DC 10/4/22 12:51


50 MCG


 


 Lidocaine HCl  15 ml  ONCE  ONCE


 PO  10/4/22 15:00


 10/4/22 15:01 DC 10/4/22 15:34


15 ML


 


 Nitroglycerin  0.4 mg  UD  PRN


 SL  10/4/22 12:45


 10/4/22 16:07 DC 10/4/22 13:32


0.4 MG





 (SOHAM OCAMPO MD)


Vital Signs/I&O











 10/4/22 10/4/22 10/4/22 10/4/22





 11:30 13:19 13:33 16:07


 


Temp 36.2   


 


Pulse 62 57 58 48


 


Resp 18 16 16 12


 


B/P (MAP) 150/90 (110) 135/81 (99) 129/78 (95) 142/86


 


Pulse Ox 99 97 97 97


 


O2 Delivery Room Air Room Air Room Air Room Air





 (SOHAM OCAMPO MD)








Blood Pressure Mean:                    110











Progress


Progress Note :  


Progress Note


Patient examined and no acute distress. Has chronic chest pain of unknown 

etiology and multiple visits to local emergency departments for CP. His cardiac 

workup last night was negative. Cardiac workup today is negative, including D-

dimer, COVID, BNP, and CXR. Given Fentanyl, MS, Nitro with no relief of symptom

s. Discussed with Dr. Stone, agrees this is not cardiac in origin, his last cath

with same presentation was unremarkable. Attempted to page Dr. Bailey, unable to

make contact. Repeated troponin which was negative. Discussed with spouse to 

have close follow up with his PCP to continue workup to find origin of chest 

pain. Tried GI cocktail in ED and reported no improvement. He incidentally noted

he had admission to [facility] 2 weeks ago for same issues and was 

hospitalized for 3 days. He was started on Topamax for Migraines and had the 

follow up with neurology scheduled. 





Discharge POC reviewed with patient and he is agreeable with plan. Will have him

off work for remainder of week. Needs to stop Topamax as this is likely cause of

his generalized "not feeling myself". Has follow up with Neurology tomorrow, 

will have him discuss with neurologist. 


 (CARMELA ALBERTO)


Initial ECG Impression Date:  Oct 4, 2022


Initial ECG Impression Time:  11:38


Initial ECG Rate:  71


Initial ECG Rhythm:  Normal Sinus


Initial ECG Intervals:  Normal


Initial ECG Impression:  Normal


Initial ECG Comparisson:  Unchanged


 (CARMELA ALBERTO)





Diagnostic Imaging





   Diagonstic Imaging:  Xray


   Plain Films/CT/US/NM/MRI:  chest


Comments


[facility] [place], [place]





NAME:   DALLASTABATHA 


MED REC#:   [id number]


ACCOUNT#:   [id number]


PT STATUS:   REG ER


:   1980


PHYSICIAN:   CARMELA ALBERTO


ADMIT DATE:   10/04/22/ER


***Signed***


Date of Exam:10/04/22





CHEST 1 VIEW, AP/PA ONLY








INDICATION: Chest pain.





Frontal chest obtained at 12:15 p.m. compared to 2022





FINDINGS: The heart is mildly enlarged. Mediastinal silhouette is


unremarkable. The lungs are clear. There is no pneumothorax or


pleural fluid.





IMPRESSION: Mild cardiomegaly with no acute process in the chest.





Dictated by: 





  Dictated on workstation # [id number]








Dict:   10/04/22 1226


Trans:   10/04/22 1352


 [id number]





Interpreted by:     DRE DOWLING MD


Electronically signed by: DRE DOWLING MD 10/04/22 1351


   Reviewed:  Reviewed by Me


 (CARMELA ALBERTO)





Departure


Communication (Admissions)


Time/Spoke to Consulting Phy:  13:42


Dr. Stone


 (CARMELA ALBERTO)





Impression





   Primary Impression:  


   Non-cardiac chest pain


Disposition:   HOME, SELF-CARE


Condition:  Stable





Departure-Patient Inst.


Decision time for Depature:  15:34


 (CARMELA ALBERTO)


Referrals:  


[facility] (PCP)


Primary Care Physician








HENOK JULIAN (Family)


Primary Care Physician


Patient Instructions:  Chest Pain That Is Not Caused by the Heart (DC)





Add. Discharge Instructions:  


Plan: 


1. Have close follow up with your primary care provider to further evaluate 

source of pain. 


2. Keep appointment with neurology as previously scheduled. 


3. Take Pepcid by mouth twice a day for a month. 


4. Return to ER for any new, concerning, or worsening symptoms. 





All discharge instructions reviewed with patient and/or family. Voiced 

understanding.


Scripts


Famotidine (Pepcid) 20 Mg Tablet


20 MG PO BID for 30 Days, #60 TAB 0 Refills


   Prov: CARMELA ALBERTO         10/4/22


Work/School Note:  Work Release Form   Date Seen in the Emergency Department:  

Oct 4, 2022


   Return to Work:  Oct 10, 2022


   Restrictions:  No Restrictions








ATTENDING PHYSICIAN NOTE:


I was physically present as attending physician in the emergency department 

during the care of this patient, but I was not directly involved in the decision

making or delivery of care for this patient. 


 (SOHAM OCAMPO MD)











CARMELA ALBERTO            Oct 4, 2022 12:05


SOHAM OCAMPO MD         Oct 4, 2022 19:11

## 2022-10-04 NOTE — DIAGNOSTIC IMAGING REPORT
INDICATION: Chest pain.



Frontal chest obtained at 12:15 p.m. compared to 01/22/2022



FINDINGS: The heart is mildly enlarged. Mediastinal silhouette is

unremarkable. The lungs are clear. There is no pneumothorax or

pleural fluid.



IMPRESSION: Mild cardiomegaly with no acute process in the chest.



Dictated by: 



  Dictated on workstation # [id number]

## 2022-10-11 ENCOUNTER — HOSPITAL ENCOUNTER (OUTPATIENT)
Dept: HOSPITAL 75 - CATH | Age: 42
Discharge: HOME | End: 2022-10-11
Attending: INTERNAL MEDICINE
Payer: COMMERCIAL

## 2022-10-11 VITALS — DIASTOLIC BLOOD PRESSURE: 87 MMHG | SYSTOLIC BLOOD PRESSURE: 134 MMHG

## 2022-10-11 VITALS — SYSTOLIC BLOOD PRESSURE: 129 MMHG | DIASTOLIC BLOOD PRESSURE: 78 MMHG

## 2022-10-11 VITALS — SYSTOLIC BLOOD PRESSURE: 126 MMHG | DIASTOLIC BLOOD PRESSURE: 84 MMHG

## 2022-10-11 VITALS — SYSTOLIC BLOOD PRESSURE: 131 MMHG | DIASTOLIC BLOOD PRESSURE: 82 MMHG

## 2022-10-11 VITALS — BODY MASS INDEX: 30.22 KG/M2 | WEIGHT: 223.11 LBS | HEIGHT: 72.01 IN

## 2022-10-11 VITALS — SYSTOLIC BLOOD PRESSURE: 126 MMHG | DIASTOLIC BLOOD PRESSURE: 80 MMHG

## 2022-10-11 VITALS — DIASTOLIC BLOOD PRESSURE: 84 MMHG | SYSTOLIC BLOOD PRESSURE: 131 MMHG

## 2022-10-11 VITALS — DIASTOLIC BLOOD PRESSURE: 83 MMHG | SYSTOLIC BLOOD PRESSURE: 131 MMHG

## 2022-10-11 VITALS — DIASTOLIC BLOOD PRESSURE: 78 MMHG | SYSTOLIC BLOOD PRESSURE: 133 MMHG

## 2022-10-11 VITALS — SYSTOLIC BLOOD PRESSURE: 129 MMHG | DIASTOLIC BLOOD PRESSURE: 81 MMHG

## 2022-10-11 DIAGNOSIS — F17.210: ICD-10-CM

## 2022-10-11 DIAGNOSIS — Z86.73: ICD-10-CM

## 2022-10-11 DIAGNOSIS — I10: ICD-10-CM

## 2022-10-11 DIAGNOSIS — F43.10: ICD-10-CM

## 2022-10-11 DIAGNOSIS — F41.9: ICD-10-CM

## 2022-10-11 DIAGNOSIS — I25.10: Primary | ICD-10-CM

## 2022-10-11 LAB
ALBUMIN SERPL-MCNC: 4.1 GM/DL (ref 3.2–4.5)
ALP SERPL-CCNC: 104 U/L (ref 40–136)
ALT SERPL-CCNC: 20 U/L (ref 0–55)
APTT BLD: 30 SEC (ref 24–35)
BILIRUB SERPL-MCNC: 0.5 MG/DL (ref 0.1–1)
BUN/CREAT SERPL: 6
CALCIUM SERPL-MCNC: 9.1 MG/DL (ref 8.5–10.1)
CHLORIDE SERPL-SCNC: 107 MMOL/L (ref 98–107)
CHOLEST SERPL-MCNC: 139 MG/DL (ref ?–200)
CO2 SERPL-SCNC: 21 MMOL/L (ref 21–32)
CREAT SERPL-MCNC: 0.9 MG/DL (ref 0.6–1.3)
GFR SERPLBLD BASED ON 1.73 SQ M-ARVRAT: 108 ML/MIN
GLUCOSE SERPL-MCNC: 83 MG/DL (ref 70–105)
HCT VFR BLD CALC: 51 % (ref 40–54)
HDLC SERPL-MCNC: 36 MG/DL (ref 40–60)
HGB BLD-MCNC: 17 G/DL (ref 13.3–17.7)
INR PPP: 1 (ref 0.8–1.4)
MCH RBC QN AUTO: 33 PG (ref 25–34)
MCHC RBC AUTO-ENTMCNC: 33 G/DL (ref 32–36)
MCV RBC AUTO: 98 FL (ref 80–99)
PLATELET # BLD: 272 10^3/UL (ref 130–400)
PMV BLD AUTO: 8.9 FL (ref 9–12.2)
POTASSIUM SERPL-SCNC: 4.2 MMOL/L (ref 3.6–5)
PROT SERPL-MCNC: 7.3 GM/DL (ref 6.4–8.2)
PROTHROMBIN TIME: 13.4 SEC (ref 12.2–14.7)
SODIUM SERPL-SCNC: 140 MMOL/L (ref 135–145)
TRIGL SERPL-MCNC: 160 MG/DL (ref ?–150)
VLDLC SERPL CALC-MCNC: 32 MG/DL (ref 5–40)
WBC # BLD AUTO: 8.5 10^3/UL (ref 4.3–11)

## 2022-10-11 PROCEDURE — 93005 ELECTROCARDIOGRAM TRACING: CPT

## 2022-10-11 PROCEDURE — 85730 THROMBOPLASTIN TIME PARTIAL: CPT

## 2022-10-11 PROCEDURE — 36415 COLL VENOUS BLD VENIPUNCTURE: CPT

## 2022-10-11 PROCEDURE — 80053 COMPREHEN METABOLIC PANEL: CPT

## 2022-10-11 PROCEDURE — 80061 LIPID PANEL: CPT

## 2022-10-11 PROCEDURE — 87081 CULTURE SCREEN ONLY: CPT

## 2022-10-11 PROCEDURE — 85610 PROTHROMBIN TIME: CPT

## 2022-10-11 PROCEDURE — 85027 COMPLETE CBC AUTOMATED: CPT

## 2022-10-11 PROCEDURE — 93458 L HRT ARTERY/VENTRICLE ANGIO: CPT

## 2022-10-11 NOTE — DISCHARGE INST-POST CATH
Discharge Inst-CATH/EP


Post Cardiac Cath/EP D/C Inst


Follow Up/Plan


F/u with Dr Bailey in 1-2 weeks


ACTIVITY





* Go Home directly and rest.


* Limit activity of the leg (or wrist if it was used) for 7 days including 

aerobics, swimming,


   jogging, bicycling, etc.


* Restrict stair-climbing for 7 days if possible, if not, climb up with your n

on-cath leg, then


   bring together on the same step.


* Avoid lifting, pushing, pulling or excessive movement of the affected ex

tremity for 7 days.


* Customary sexual activity may be resumed after 2 days-use caution not to use a

position  


   that strains or causes pain to the affected extremity.


* No driving for 24 hours.


* NO SMOKING. 


* Avoid straining for bowel movements for 7 days.


* Gentle walking on level ground is allowed.


* Returning to work will depend on the type of procedure and the results. Your 

doctor will discuss


   this with you.





CALL YOUR DOCTOR FOR ANY OF THE FOLLOWING:





*If bleeding from the puncture site occurs- Apply gentle pressure to site with 

clean cloth and call


   your doctor or EMS.


* If a knot or lump forms under the skin, increases in size, or causes pain.


* If bruising appears to be worsening or moving further down your leg instead of

disappearing.


* Temperature above 101 F.





CARE OF YOUR GROIN INCISION;





* Bruising or purple discoloration of the skin near the puncture site is common.


* You may shower only, no bathtub bathing for 5 days.  Be careful to avoid 

slipping as your


   leg may feel stiff.


* If a closure device was used on your femoral artery, please see the attached 

guide regarding


   care of the device and your leg.


* Leave dressing on FOR 24 hours.





CARE OF YOUR WRIST INCISION;





* Bruising or purple discoloration of the skin near the puncture site is common.


* You may shower.


* DO NOT submerge wrist.


* Leave dressing on FOR 24 hours.











DELL BAILEY MD [facility] CCDS   Oct 11, 2022 16:58

## 2022-10-11 NOTE — CARDIAC CATHETERIZATION
DATE OF SERVICE:  10/11/2022



CARDIAC CATHETERIZATION REPORT



INDICATION FOR PROCEDURE:

The patient is a 42-year-old gentleman, who has coronary artery disease risk

factors and who has been presenting to the emergency room repeatedly for severe

chest pain.  He remains concerned that this is of cardiac origin.  Accordingly,

cardiac catheterization was carried out after having obtained an informed

consent.



DESCRIPTION OF PROCEDURE:

He was brought to the cardiac catheterization laboratory in a fasting state. 

Right groin was prepared and draped in the usual sterile fashion.  Lidocaine 1%

was used for local anesthesia.  Modified Seldinger technique was used to advance

a 5-Greenlandic sheath in the right femoral artery, 5-Greenlandic JL4 catheter was used

for left coronary angiography, 5-Greenlandic JR4 catheter was used for right coronary

angiography, 5-Greenlandic pigtail catheter was used for left heart catheterization

and left ventricular angiography.  At the end of the procedure, angiography of

the right femoral artery was carried out through the sheath and Mynx was used to

achieve hemostasis.



HEMODYNAMICS:

Left ventricular end-diastolic pressure following coronary angiography was 15

mmHg.  There was no significant pressure gradient on pullback across the aortic

valve.



CORONARY ANGIOGRAPHY:

Left main coronary artery, left anterior descending artery, left circumflex

artery, and right coronary artery do not have any angiographically significant

coronary artery disease.  Right coronary artery is dominant.



CONCLUSIONS:

1.  No angiographically significant coronary artery disease.

2.  Normal global left ventricular systolic function with an ejection fraction

of 50% to 55%.

3.  Mild elevation of left ventricular end-diastolic pressure (15 mmHg).



DISCUSSION AND RECOMMENDATIONS:

Based on results of the study, chest discomfort does not appear to be of cardiac

origin.  Continuing risk factor modification is advised.  He has been on

ibuprofen 800 mg three times a day for many years.  This was discontinued

yesterday.  We have advised followup with family physician for evaluation for

peptic ulcer disease or gastroesophageal reflux disease or other causes of

noncardiac chest pain.  He had been placed on aspirin until the cardiac

catheterization was done.  Now that the cardiac catheterization does not

indicate any significant coronary artery disease, we have advised him to

discontinue aspirin.  Beta blocker therapy is being continued because of his

mild to moderate hypertension.  Outpatient followup is advised.





Job ID: [id number]

DocumentID: [id number]

Dictated Date:  10/11/2022 17:02:30

Transcription Date: 10/11/2022 19:41:26

Dictated By: DELL ROSEN MD, MA, FACP, FACC,

## 2022-10-11 NOTE — DISCHARGE INST-CARDIOLOGY
Discharge Inst-Cardiac


Discharge Medications


Continued Medications:  


Metoprolol Succinate (Toprol Xl) 25 Mg Tab.er.24h


25 MG PO DAILY, TAB





Pantoprazole Sodium (Protonix) 40 Mg Tablet.dr


40 MG PO DAILY, TAB





 


Discontinued Medications:  


Aspirin (Aspirin) 81 Mg Tab.chew


81 MG PO DAILY, TAB





Nitroglycerin (Nitroglycerin) 0.4 Mg Tab.subl


0.4 MG SL UD PRN for CHEST PAIN, TAB

















DELL ROSEN MD FACP [facility] CCDS   Oct 11, 2022 16:57

## 2022-10-11 NOTE — CARDIAC PROCEDURE NOTE-CS/ASA
Pre-Procedure Note


Pre-Op Procedure Note


Date of Available H&P:  Oct 10, 2022


Date H&P Reviewed:  Oct 11, 2022


Time H&P Reviewed:  15:00


History & Physical:  No changes noted





Conscious Sedation Pre-Proced


ASA Score


3


For ASA 3 and 4: Consider anesthesia and medical clearance. Also, for patients

with a history of failed moderate sedation consider anesthesia.

















Airway 


 


Lungs 


 


Heart 


 


 ASA score


 


 ASA 1: a normal healthy patient


 


 ASA 2:  a patient with a mild systemic disease (mid diabetes, controlled 

hypertension, obesity 


 


 ASA 3:  a patient with a severe systemic disease that limits activity  (angina,

COPD, prior Myocardial infarction)


 


 ASA 4:  a patient with an incapacitating disease that is a constant threat to 

life (CHF, renal failure)


 


 ASA 5:  a moribund patient not expected to survive 24 hrs.  (ruptured aneurysm)


 


 ASA 6:  a declared brain-dead patient whose organs are being harvested.


 


 For emergent operations, add the letter E after the classification











Mallampati Classification


Grade 2





Sedation Plan


Analgesia, Amnesia, Plan communicated to team members


The patient is an appropriate candidate to undergo the planned procedure, 

sedation, and anesthesia.





The patient immediately re-assessed prior to indication.











DELL ROSEN MD FACP FAC CCDS   Oct 11, 2022 16:55

## 2022-12-12 ENCOUNTER — HOSPITAL ENCOUNTER (EMERGENCY)
Dept: HOSPITAL 75 - ER | Age: 42
Discharge: HOME | End: 2022-12-12
Payer: COMMERCIAL

## 2022-12-12 VITALS — SYSTOLIC BLOOD PRESSURE: 119 MMHG | DIASTOLIC BLOOD PRESSURE: 70 MMHG

## 2022-12-12 VITALS — BODY MASS INDEX: 30.55 KG/M2 | HEIGHT: 71.65 IN | WEIGHT: 223.11 LBS

## 2022-12-12 VITALS — DIASTOLIC BLOOD PRESSURE: 104 MMHG | SYSTOLIC BLOOD PRESSURE: 155 MMHG

## 2022-12-12 DIAGNOSIS — R20.0: ICD-10-CM

## 2022-12-12 DIAGNOSIS — R07.9: ICD-10-CM

## 2022-12-12 DIAGNOSIS — Z86.73: ICD-10-CM

## 2022-12-12 DIAGNOSIS — I10: Primary | ICD-10-CM

## 2022-12-12 DIAGNOSIS — F17.200: ICD-10-CM

## 2022-12-12 LAB
ALBUMIN SERPL-MCNC: 4.2 GM/DL (ref 3.2–4.5)
ALP SERPL-CCNC: 86 U/L (ref 40–136)
ALT SERPL-CCNC: 20 U/L (ref 0–55)
APTT BLD: 29 SEC (ref 24–35)
BASOPHILS # BLD AUTO: 0.1 10^3/UL (ref 0–0.1)
BASOPHILS NFR BLD AUTO: 1 % (ref 0–10)
BILIRUB SERPL-MCNC: 0.5 MG/DL (ref 0.1–1)
BUN/CREAT SERPL: 8
CALCIUM SERPL-MCNC: 8.9 MG/DL (ref 8.5–10.1)
CHLORIDE SERPL-SCNC: 106 MMOL/L (ref 98–107)
CO2 SERPL-SCNC: 23 MMOL/L (ref 21–32)
CREAT SERPL-MCNC: 0.92 MG/DL (ref 0.6–1.3)
D DIMER PPP FEU-MCNC: 0.76 UG/ML (ref 0–0.49)
EOSINOPHIL # BLD AUTO: 0.2 10^3/UL (ref 0–0.3)
EOSINOPHIL NFR BLD AUTO: 3 % (ref 0–10)
GFR SERPLBLD BASED ON 1.73 SQ M-ARVRAT: 105 ML/MIN
GLUCOSE SERPL-MCNC: 86 MG/DL (ref 70–105)
HCT VFR BLD CALC: 48 % (ref 40–54)
HGB BLD-MCNC: 16.2 G/DL (ref 13.3–17.7)
INR PPP: 1 (ref 0.8–1.4)
LYMPHOCYTES # BLD AUTO: 1.3 10^3/UL (ref 1–4)
LYMPHOCYTES NFR BLD AUTO: 15 % (ref 12–44)
MANUAL DIFFERENTIAL PERFORMED BLD QL: NO
MCH RBC QN AUTO: 33 PG (ref 25–34)
MCHC RBC AUTO-ENTMCNC: 34 G/DL (ref 32–36)
MCV RBC AUTO: 97 FL (ref 80–99)
MONOCYTES # BLD AUTO: 0.6 10^3/UL (ref 0–1)
MONOCYTES NFR BLD AUTO: 7 % (ref 0–12)
NEUTROPHILS # BLD AUTO: 6.6 10^3/UL (ref 1.8–7.8)
NEUTROPHILS NFR BLD AUTO: 75 % (ref 42–75)
PLATELET # BLD: 270 10^3/UL (ref 130–400)
PMV BLD AUTO: 8.8 FL (ref 9–12.2)
POTASSIUM SERPL-SCNC: 3.5 MMOL/L (ref 3.6–5)
PROT SERPL-MCNC: 7.1 GM/DL (ref 6.4–8.2)
PROTHROMBIN TIME: 13.4 SEC (ref 12.2–14.7)
SODIUM SERPL-SCNC: 139 MMOL/L (ref 135–145)
WBC # BLD AUTO: 8.7 10^3/UL (ref 4.3–11)

## 2022-12-12 PROCEDURE — 85730 THROMBOPLASTIN TIME PARTIAL: CPT

## 2022-12-12 PROCEDURE — 85610 PROTHROMBIN TIME: CPT

## 2022-12-12 PROCEDURE — 85025 COMPLETE CBC W/AUTO DIFF WBC: CPT

## 2022-12-12 PROCEDURE — 70450 CT HEAD/BRAIN W/O DYE: CPT

## 2022-12-12 PROCEDURE — 84484 ASSAY OF TROPONIN QUANT: CPT

## 2022-12-12 PROCEDURE — 36415 COLL VENOUS BLD VENIPUNCTURE: CPT

## 2022-12-12 PROCEDURE — 82947 ASSAY GLUCOSE BLOOD QUANT: CPT

## 2022-12-12 PROCEDURE — 93005 ELECTROCARDIOGRAM TRACING: CPT

## 2022-12-12 PROCEDURE — 85379 FIBRIN DEGRADATION QUANT: CPT

## 2022-12-12 PROCEDURE — 71045 X-RAY EXAM CHEST 1 VIEW: CPT

## 2022-12-12 PROCEDURE — 93041 RHYTHM ECG TRACING: CPT

## 2022-12-12 PROCEDURE — 80053 COMPREHEN METABOLIC PANEL: CPT

## 2022-12-12 NOTE — DIAGNOSTIC IMAGING REPORT
EXAMINATION: CT head without contrast.



TECHNIQUE: Multiple contiguous axial images were obtained through

the brain without the use of intravenous contrast. All CT scans

use one or more of the following dose optimizing techniques:

automated exposure control, MA and/or KvP adjustment based on

patient size and exam type or iterative reconstruction. 



HISTORY: Left-sided numbness. Dizziness.



COMPARISON: 01/22/2022.



FINDINGS: No large acute territorial ischemia, mass, or

hemorrhage. No midline shift or mass effect. The ventricles,

cortical sulci, and basilar cisterns are patent and unremarkable.





The orbits are normal. Paranasal sinuses are normal. Mastoid air

cells are clear. No soft tissue abnormality is seen. No osseus

lesions or fractures are seen.



IMPRESSION:  

1. No large acute territorial ischemia, mass, or hemorrhage.



Dictated by: 



  Dictated on workstation # [id number]

## 2022-12-12 NOTE — ED NEUROLOGICAL PROBLEM
General


Stated Complaint:  LIGHTHEADED | DIZZY| LT SIDE NUMBNESS


Source:  patient


Exam Limitations:  physical impairment





History of Present Illness


Date Seen by Provider:  Dec 12, 2022


Time Seen by Provider:  10:25


Initial Comments


Patient is a 42-year-old male presents to the emergency room with approximately 

45 minutes of lightheadedness, dizziness, pain to his left side and he states 

increased numbness to his left side.  Patient reports that he has had previous 

"stroke" treated in [place] in 2018 or 19.  He states that he is compliant with 

his daily medications.  He was at work when the symptoms started.  He denies 

headache but states that his left eye is a little bit blurry.  He is able to 

walk and function.  He denies any recent illnesses such as fevers, chills, cough

or congestion.  No problems with bowel or bladder.  No incontinence 

specifically.  He states he has had intermittent symptoms similar to this off 

and on since he had his "stroke".





He states he does smoke.  He occasionally drinks alcohol.  He denies 

recreational drugs.





On arrival he is mentating appropriately although slow deliberate speech.  Vital

signs are stable.





All other review of systems reviewed and negative except as stated.


Timing/Duration:  1 hour


Severity:  mild


Associated Symptoms:  fatigue, paresthesia, vision changes (left eye blurry)





Allergies and Home Medications


Allergies


Coded Allergies:  


     Sulfa (Sulfonamide Antibiotics) (Verified  Allergy, Severe, HIVES, 

12/12/22)


     levofloxacin (Verified  Allergy, Unknown, 12/12/22)


     doxycycline (Verified  Adverse Reaction, Mild, Vomiting, 12/12/22)





Patient Home Medication List


Home Medication List Reviewed:  Yes


Metoprolol Succinate (Toprol Xl) 25 Mg Tab.er.24h, 25 MG PO DAILY, (Reported)


   Entered as Reported by: AMAN MATTHEW on 10/11/22 1200


Pantoprazole Sodium (Protonix) 40 Mg Tablet.dr, 40 MG PO DAILY, (Reported)


   Entered as Reported by: AMAN MATTHEW on 10/11/22 1200





Review of Systems


Review of Systems


Constitutional:  see HPI, dizziness, malaise, weakness


Eyes:  Blurred Vision (left eye)


Ears, Nose, Mouth, Throat:  no symptoms reported


Respiratory:  no symptoms reported


Cardiovascular:  no symptoms reported


Gastrointestinal:  no symptoms reported


Genitourinary:  no symptoms reported


Musculoskeletal:  no symptoms reported


Psychiatric/Neurological:  Weakness (left side)





All Other Systems Reviewed


Negative Unless Noted:  Yes





Past Medical-Social-Family Hx


Immunizations Up To Date


First/Initial COVID19 Vaccinat:  3/21


Second COVID19 Vaccination Bo:  4/21


Third COVID19 Vaccination Date:  3/21





Seasonal Allergies


Seasonal Allergies:  No





Past Medical History


Surgery/Hospitalization HX:  


CHOLECYSTECTOMY, UMBILICAL HERNIA REPAIR, RENAL STONES, APPY,VASECTOMY, T/A, CVA




2019, ANXIETY, PTSD


Surgeries:  Yes (KIDNEY STONES--BASKET REMOVAL, )


Abdominal, Adenoidectomy, Gallbladder, Tonsillectomy, Vasectomy


Respiratory:  No


Currently Using CPAP:  No


Currently Using BIPAP:  No


Cardiac:  No


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


Stroke


Reproductive Disorders:  No


Genitourinary:  Yes


Kidney Stones


Gastrointestinal:  Yes (CHRONIC GI COMPLAINTS, umbilical hernia)


Abdominal Hernia, Gall Bladder Disease


Musculoskeletal:  Yes


Chronic Back Pain


Endocrine:  Yes (hypoglycemia)


HEENT:  No


Tonsilitis


Cancer:  No


Psychosocial:  Yes


Anxiety, PTSD


Integumentary:  No


Blood Disorders:  No





Family Medical History


No Pertinent Family Hx





SOCIAL HISTORY:


-ETOH--OCCASIONAL USE


-DRUGS--DENIES USE, BUT UDS HAS BEEN + FOR THC


-SMOKES 1 PPD





PAST SURGICAL HISTORY:


-KIDNEY STONES-BASKET REMOVAL


-TONSILLECTOMY/ADENOIDECTOMY


-VASECTOMY


-CARDIAC CATH 12/15/20 BY :


CONCLUSIONS:


1.  No angiographically significant coronary artery disease.


2.  Normal left ventricular end-diastolic pressure.


3.  Normal left ventricular systolic function with ejection fraction of


55% to


60%.


DISCUSSION AND RECOMMENDATIONS:


Based on results of the study, chest discomfort does not appear to be of


coronary origin.  Risk factor modification is advised.  Have advised to


refrain


from tobacco use.  Other risk factor modification has also been discussed. 


Outpatient followup is advised.





-LAPAROSCOPIC CHOLECYSTECTOMY AND UMBILICAL HERNIA REPAIR 12/30/21 BY DR. PHILIP.





Physical Exam


Vital Signs





Vital Signs - First Documented




















Capillary Refill :


Height, Weight, BMI


Height: 6'"


Weight: 242lbs. oz. 109.721688sd; 30.25 BMI


Method:Stated


General Appearance:  WD/WN, no apparent distress


HEENT:  PERRL/EOMI, normal ENT inspection, pharynx normal (Appears adequately 

hydrated)


Neck:  full range of motion, supple


Respiratory:  lungs clear, normal breath sounds, no respiratory distress, no 

accessory muscle use


Cardiovascular:  regular rate, rhythm, other (Brisk capillary refill)


Peripheral Pulses:  2+ Radial Pulses (R), 2+ Radial Pulses (L)


Gastrointestinal:  normal bowel sounds, non tender, soft


Back:  normal inspection


Extremities:  normal range of motion, non-tender, normal inspection, no pedal 

edema, no calf tenderness


Neurologic/Psychiatric:  CNs II-XII nml as tested, no motor/sensory deficits, 

alert, normal mood/affect, oriented x 3; No abnormal cerebellar tests


Crainal Nerves:  normal hearing; No abnormal eye position, No abnormal gag 

reflex, No abnormal pupil position, No abnormal speech, No facial asymmetry, No 

facial droop, No facial paresthesias, No facial weakness, No gaze palsy, No 

tongue deviation to R, No tongue deviation to L; other (Slow deliberate speech, 

no dysarthria)


Coordination/Gait:  normal finger to nose, normal gait, negative Romberg's sign


Motor/Sensory:  other (Patient's motor strength testing is inconsistent.  He 

initially does not put forth much effort but when he is encouraged to squeeze my

hand and to push and pull with his upper extremities strength is perceived to be

equal in the bilateral upper extremities.  Also dorsiflexion of the great toe on

the bilateral feet seems symmetric with encouragement; he does endorse 

subjective decrease sensation over the left face, left hand and left leg)


Skin:  normal color, warm/dry





Stroke


Stroke Thrombolytic Exclusion


Age 18 or Over:  Yes


Acute intenal hemorrhage:  No


History of CVA:  Yes


Uncontrolled Coagulation Defec:  No


Intracranial Hemorrhage:  No


Severe Hypertension:  No


GI or  Bleed:  No


Subarachnoid Hemorrhage:  No


Intracranial Neoplasm/Aneurysm:  No


Oral Anticoagulants:  No


Surgery or Trauma:  Yes


Puncture of Non-Compressible V:  No


Recent CPR:  No


Diabetic Hemorrhagic Retinopat:  No


Organ Biopsy:  No


Glucose:  No


Significant Hepatic Dysfunctio:  No


NIH Stoke Scale >22:  No


Bacterial Endocarditis:  No


Pericarditis:  No


Improving Symptoms:  Yes


Platelets:  No





Progress/Results/Core Measures


Results/Orders


Lab Results





Laboratory Tests








Test


 12/12/22


10:30 12/12/22


10:33 Range/Units


 


 


Glucometer 96     MG/DL


 


White Blood Count


 


 8.7 


 4.3-11.0


10^3/uL


 


Red Blood Count


 


 4.91 


 4.30-5.52


10^6/uL


 


Hemoglobin  16.2  13.3-17.7  g/dL


 


Hematocrit  48  40-54  %


 


Mean Corpuscular Volume  97  80-99  fL


 


Mean Corpuscular Hemoglobin  33  25-34  pg


 


Mean Corpuscular Hemoglobin


Concent 


 34 


 32-36  g/dL





 


Red Cell Distribution Width  13.0  10.0-14.5  %


 


Platelet Count


 


 270 


 130-400


10^3/uL


 


Mean Platelet Volume  8.8 L 9.0-12.2  fL


 


Immature Granulocyte % (Auto)  0   %


 


Neutrophils (%) (Auto)  75  42-75  %


 


Lymphocytes (%) (Auto)  15  12-44  %


 


Monocytes (%) (Auto)  7  0-12  %


 


Eosinophils (%) (Auto)  3  0-10  %


 


Basophils (%) (Auto)  1  0-10  %


 


Neutrophils # (Auto)


 


 6.6 


 1.8-7.8


10^3/uL


 


Lymphocytes # (Auto)


 


 1.3 


 1.0-4.0


10^3/uL


 


Monocytes # (Auto)


 


 0.6 


 0.0-1.0


10^3/uL


 


Eosinophils # (Auto)


 


 0.2 


 0.0-0.3


10^3/uL


 


Basophils # (Auto)


 


 0.1 


 0.0-0.1


10^3/uL


 


Immature Granulocyte # (Auto)


 


 0.0 


 0.0-0.1


10^3/uL


 


Prothrombin Time  13.4  12.2-14.7  SEC


 


INR Comment  1.0  0.8-1.4  


 


Activated Partial


Thromboplast Time 


 29 


 24-35  SEC





 


D-Dimer


 


 0.76 H


 0.00-0.49


UG/ML


 


Sodium Level  139  135-145  MMOL/L


 


Potassium Level  3.5 L 3.6-5.0  MMOL/L


 


Chloride Level  106    MMOL/L


 


Carbon Dioxide Level  23  21-32  MMOL/L


 


Anion Gap  10  5-14  MMOL/L


 


Blood Urea Nitrogen  7  7-18  MG/DL


 


Creatinine


 


 0.92 


 0.60-1.30


MG/DL


 


Estimat Glomerular Filtration


Rate 


 105 


  





 


BUN/Creatinine Ratio  8   


 


Glucose Level  86    MG/DL


 


Calcium Level  8.9  8.5-10.1  MG/DL


 


Corrected Calcium  8.7  8.5-10.1  MG/DL


 


Total Bilirubin  0.5  0.1-1.0  MG/DL


 


Aspartate Amino Transf


(AST/SGOT) 


 16 


 5-34  U/L





 


Alanine Aminotransferase


(ALT/SGPT) 


 20 


 0-55  U/L





 


Alkaline Phosphatase  86    U/L


 


Troponin I  < 0.028  <0.028  NG/ML


 


Total Protein  7.1  6.4-8.2  GM/DL


 


Albumin  4.2  3.2-4.5  GM/DL








My Orders





Orders - JESSICA MCGREGOR MD


Cbc With Automated Diff (12/12/22 10:53)


Protime With Inr (12/12/22 10:53)


Partial Thromboplastin Time (12/12/22 10:53)


Comprehensive Metabolic Panel (12/12/22 10:53)


Fibrin Degradation Products (12/12/22 10:53)


Troponin I [place] (12/12/22 10:53)


Ua Culture If Indicated (12/12/22 10:53)


Chest 1 View, Ap/Pa Only (12/12/22 10:53)


Ekg Tracing (12/12/22 10:53)


Nothing By Mouth (12/12/22 Lunch)


Accucheck Stat ONCE (12/12/22 10:53)


Ed Iv/Invasive Line Start (12/12/22 10:53)


Ed Iv/Invasive Line Start (12/12/22 10:53)


Vital Signs Stroke Patient Q15M (12/12/22 10:53)


O2 (12/12/22 10:53)


Intake & Output 06,14,22 (12/12/22 10:53)


Monitor-Rhythm Ecg Trace Only (12/12/22 10:53)


Dysphagia Screening Tool Q10MX1 (12/12/22 10:53)


Post Thrombolytic Adminstratio (12/12/22 10:53)


Lipid Panel (12/13/22 06:00)


Ct Head Wo-R/O Stroke (12/12/22 11:10)


Acetaminophen  Tablet (Tylenol  Tablet) (12/12/22 12:00)





Medications Given in ED





Current Medications








 Medications  Dose


 Ordered  Sig/Duy


 Route  Start Time


 Stop Time Status Last Admin


Dose Admin


 


 Acetaminophen  1,000 mg  ONCE  ONCE


 PO  12/12/22 12:00


 12/12/22 12:01 DC 12/12/22 12:14


1,000 MG








Vital Signs/I&O











 12/12/22 12/12/22





 10:25 10:25


 


Temp 36.9 


 


Pulse 67 67


 


Resp 16 16


 


B/P (MAP) 155/104 (121) 155/104


 


Pulse Ox 100 100


 


O2 Delivery Room Air 











Progress


Progress Note :  


   Time:  12:33


Progress Note


Patient seen and evaluated, 42-year-old male history of hypertension previous 

stroke.  Evaluation today includes "stroke work-up".  NIH "1" for "numbness".   

All of his labs have been reviewed, very minimal elevation in his D-dimer.  CT 

shows no concern for acute intracranial pathology.  Neurologically he is 

"normal" although his subjective decrease in sensation which the patient states 

is both chronic and intermittent.  He states he has had similar presentations to

this before but no recurrent stroke.  He states that he is compliant with his 

daily medications.  He has no recent illnesses.  No findings concerning for 

sepsis.  No actual physical exam findings that are concerning for acute 

cerebrovascular accident.  He is normal sinus rhythm on the monitor.  Heart rate

is 64.  Slightly hypertensive with a diastolic of 104.





I do not think that with his presentation he warrants admission at this time.  I

do not believe,  although consideration was made for,  CT angiography of the 

head and neck is necessary at this time.





Of note patient has had multiple visits to the emergency department for chest 

pain and left-sided weakness.  He had an MRI with and without contrast of the 

brain in April 2022 that showed absolutely no acute pathology or old pathology 

in the brain concerning for stroke.  He also at around that time had left heart 

cath which showed no identifiably significant coronary atherosclerotic disease.





Patient strongly encouraged to contact his primary care physician today and 

follow-up this week.  Work note given for today.  





BP at discharge 119/70 with HR 50's; no complaints.


Initial ECG Impression Date:  Dec 12, 2022


Initial ECG Impression Time:  10:27


Initial ECG Rate:  56


Initial ECG Rhythm:  Normal Sinus


Initial ECG Intervals:  Normal


Initial ECG Impression:  Normal


Initial ECG Comparisson:  Unchanged





Departure


Impression





   Primary Impression:  


   Paresthesia of left arm and leg


   Additional Impression:  


   High blood pressure


   Qualified Codes:  I10 - Essential (primary) hypertension


Disposition:  01 HOME, SELF-CARE


Condition:  Stable





Departure-Patient Inst.


Decision time for Depature:  12:43


Referrals:  


[facility] (PCP/Family)


Primary Care Physician


Patient Instructions:  High Blood Pressure ED





Add. Discharge Instructions:  


You have had no evidence of stroke today on your evaluation in the emergency 

department.





Your blood pressure is found to be quite high.  You need to make sure that you 

are continuing to take your blood pressure medications.





Please call your primary care doctor today for a follow-up appointment this 

week.





Come back to the emergency department if you have any worsening symptoms of 

weakness of the left side of your body, vomiting, severe headache or other 

emergent, concerning symptoms.


Work/School Note:  Work Release Form   Date Seen in the Emergency Department:  

Dec 12, 2022


   Return to Work:  Dec 13, 2022





Copy


Copies To 1:   YEE PATEL KATHRYN M MD         Dec 12, 2022 10:53

## 2022-12-12 NOTE — DIAGNOSTIC IMAGING REPORT
INDICATION: Headache, dizziness and paresthesia.



Single AP view of the chest is obtained with comparison made to

study of 10/04/2022



FINDINGS: Heart size and pulmonary vascularity are within normal

limits, and the lungs are clear, bilaterally.  



IMPRESSION: Unremarkable chest. 



Dictated by: 



  Dictated on workstation # [id number]

## 2022-12-20 ENCOUNTER — HOSPITAL ENCOUNTER (EMERGENCY)
Dept: HOSPITAL 75 - ER | Age: 42
Discharge: HOME | End: 2022-12-20
Payer: COMMERCIAL

## 2022-12-20 VITALS — SYSTOLIC BLOOD PRESSURE: 141 MMHG | DIASTOLIC BLOOD PRESSURE: 96 MMHG

## 2022-12-20 VITALS — WEIGHT: 231.49 LBS | BODY MASS INDEX: 31.7 KG/M2 | HEIGHT: 71.65 IN

## 2022-12-20 DIAGNOSIS — Z28.310: ICD-10-CM

## 2022-12-20 DIAGNOSIS — H10.9: ICD-10-CM

## 2022-12-20 DIAGNOSIS — Z88.2: ICD-10-CM

## 2022-12-20 DIAGNOSIS — F17.210: ICD-10-CM

## 2022-12-20 DIAGNOSIS — M25.511: Primary | ICD-10-CM

## 2022-12-20 PROCEDURE — 99284 EMERGENCY DEPT VISIT MOD MDM: CPT

## 2022-12-20 NOTE — ED GENERAL
General


Chief Complaint:  General Problems/Pain


Stated Complaint:  LT EYE AND RT SHOULDER PAIN


Nursing Triage Note:  


ARRIVED VIA AMB TO ROOM 06 WITH COMPLAINTS OF RIGHT SHOULDER PAIN X1 WEEK.  NO 


INJURY.  ALSO STATES HE WOKE UP WITH LEFT EYE PAIN AND DRAINAGE THIS AM.


Source of Information:  Patient


Exam Limitations:  No Limitations





History of Present Illness


Date Seen by Provider:  Dec 20, 2022


Time Seen by Provider:  09:09


Initial Comments


Patient is a 42-year-old male who presents to the emergency department today 

with a chief complaint of right shoulder pain.  Onset approximately a week ago, 

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  He denies

any direct trauma.  No numbness, tingling or weakness to the right upper 

extremity.  He has used Tylenol and ibuprofen as well as over-the-counter 

Biofreeze and lidocaine patches without relief.  No fevers or chills.  He woke 

up this morning with left eye matting, green and yellow.  Had to use hot water 

to be able to open his eye.  He states the eye "stings".  He endorses blurry 

vision with tearing.  No specific eye pain.  He is not a diabetic.  Denies any 

sick contacts.





Patient states when he came into work this morning one of the ladies at work 

told him he could not be there because of his eye.





All other review of systems reviewed and negative except as stated.


Timing/Duration:  1 Hour (left eye redness and drainage), 1 Week (shoulder pain)


Severity:  Mild


Associated Systoms:  Denies Symptoms





Allergies and Home Medications


Allergies


Coded Allergies:  


     Sulfa (Sulfonamide Antibiotics) (Verified  Allergy, Severe, HIVES, 

12/12/22)


     levofloxacin (Verified  Allergy, Unknown, 12/12/22)


     doxycycline (Verified  Adverse Reaction, Mild, Vomiting, 12/12/22)





Patient Home Medication List


Home Medication List Reviewed:  Yes


Discontinued Medications


Metoprolol Succinate (Toprol Xl) 25 Mg Tab.er.24h, 25 MG PO DAILY, (Reported)


   Discontinued Reason: No Longer Taking


   Entered as Reported by: AMAN MATTHEW on 10/11/22 1200


   Last Action: Discontinued


Pantoprazole Sodium (Protonix) 40 Mg Tablet.dr, 40 MG PO DAILY, (Reported)


   Discontinued Reason: No Longer Taking


   Entered as Reported by: AMAN MATTHEW on 10/11/22 1200


   Last Action: Discontinued





Review of Systems


Review of Systems


Constitutional:  see HPI


EENTM:  eye pain ("stinging" lwft eye), tearing (left eye)


Respiratory:  no symptoms reported


Cardiovascular:  no symptoms reported


Gastrointestinal:  no symptoms reported


Genitourinary:  no symptoms reported


Musculoskeletal:  joint pain (right shoulder)


Skin:  no symptoms reported


Psychiatric/Neurological:  No Symptoms Reported





All Other Systems Reviewed


Negative Unless Noted:  Yes





Past Medical-Social-Family Hx


Patient Social History


Tobacco Use?:  Yes


Smoking Status:  Current Everyday Smoker


Substance use?:  No


Alcohol Use?:  No





Immunizations Up To Date


First/Initial COVID19 Vaccinat:  3/21


Second COVID19 Vaccination Bo:  [place]


Third COVID19 Vaccination Date:  3/21


COVID19 Vaccine :  [employer] [place]





Seasonal Allergies


Seasonal Allergies:  No





Past Medical History


Surgery/Hospitalization HX:  


CHOLECYSTECTOMY, UMBILICAL HERNIA REPAIR, RENAL STONES, APPY,VASECTOMY, T/A, CVA




2019, ANXIETY, PTSD


Surgeries:  Yes (KIDNEY STONES--BASKET REMOVAL, )


Abdominal, Adenoidectomy, Gallbladder, Tonsillectomy, Vasectomy


Respiratory:  No


Currently Using CPAP:  No


Currently Using BIPAP:  No


Cardiac:  No


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


Stroke


Reproductive Disorders:  No


Genitourinary:  Yes


Kidney Stones


Gastrointestinal:  Yes (CHRONIC GI COMPLAINTS, umbilical hernia)


Abdominal Hernia, Gall Bladder Disease


Musculoskeletal:  Yes


Chronic Back Pain


Endocrine:  Yes (hypoglycemia)


HEENT:  No


Tonsilitis


Cancer:  No


Psychosocial:  Yes


Anxiety, PTSD


Integumentary:  No


Blood Disorders:  No





Family Medical History


No Pertinent Family Hx





SOCIAL HISTORY:


-ETOH--OCCASIONAL USE


-DRUGS--DENIES USE, BUT UDS HAS BEEN + FOR THC


-SMOKES 1 PPD





PAST SURGICAL HISTORY:


-KIDNEY STONES-BASKET REMOVAL


-TONSILLECTOMY/ADENOIDECTOMY


-VASECTOMY


-CARDIAC CATH 12/15/20 BY :


CONCLUSIONS:


1.  No angiographically significant coronary artery disease.


2.  Normal left ventricular end-diastolic pressure.


3.  Normal left ventricular systolic function with ejection fraction of


55% to


60%.


DISCUSSION AND RECOMMENDATIONS:


Based on results of the study, chest discomfort does not appear to be of


coronary origin.  Risk factor modification is advised.  Have advised to


refrain


from tobacco use.  Other risk factor modification has also been discussed. 


Outpatient followup is advised.





-LAPAROSCOPIC CHOLECYSTECTOMY AND UMBILICAL HERNIA REPAIR 12/30/21 BY DR. PHILIP.





Physical Exam


Vital Signs





Vital Signs - First Documented








 12/20/22





 08:52


 


Temp 35.8


 


Pulse 72


 


Resp 16


 


B/P (MAP) 141/96 (111)


 


Pulse Ox 99


 


O2 Delivery Room Air





Capillary Refill : Less Than 3 Seconds


Height, Weight, BMI


Height: 6'"


Weight: 242lbs. oz. 109.947689gt; 31.00 BMI


Method:Stated


General Appearance:  No Apparent Distress, WD/WN


Eyes:  Left Eye Other (scleral injection; tearing; PERRLA; EOMI.)


HEENT:  PERRL/EOMI, Pharynx Normal


Neck:  Full Range of Motion, Normal Inspection


Respiratory:  Lungs Clear, Normal Breath Sounds, No Accessory Muscle Use, No 

Respiratory Distress


Cardiovascular:  Regular Rate, Rhythm, Normal Peripheral Pulses, Diastolic 

Murmur


Extremity:  Normal Capillary Refill, Normal Inspection, Normal Range of Motion, 

Non Tender, No Calf Tenderness, Other (right shoulder FROM; normal strength and 

sensation.  tenderness to palpation lateral scapula posteriorly; no rashes)


Neurologic/Psychiatric:  Alert, Oriented x3, No Motor/Sensory Deficits, Normal 

Mood/Affect, CNs II-XII Norm as Tested


Skin:  Normal Color, Warm/Dry





Progress/Results/Core Measures


Suspected Sepsis


SIRS


Temperature: 


Pulse: 72 


Respiratory Rate: 16


 


Blood Pressure 141 /96 


Mean: 111





Results/Orders


My Orders





Orders - JESSICA MCGREGOR MD


Orphenadrine Inj (Ed Only) (Norflex Inje (12/20/22 09:30)





Vital Signs/I&O











 12/20/22





 08:52


 


Temp 35.8


 


Pulse 72


 


Resp 16


 


B/P (MAP) 141/96 (111)


 


Pulse Ox 99


 


O2 Delivery Room Air





Capillary Refill : Less Than 3 Seconds








Blood Pressure Mean:                    111











Departure


Impression





   Primary Impression:  


   Shoulder pain, right


   Qualified Codes:  M25.511 - Pain in right shoulder


   Additional Impression:  


   Conjunctivitis


   Qualified Codes:  H10.32 - Unspecified acute conjunctivitis, left eye


Disposition:  01 HOME, SELF-CARE


Condition:  Stable





Departure-Patient Inst.


Decision time for Depature:  09:25


Referrals:  


[facility] (PCP/Family)


Primary Care Physician


Patient Instructions:  Conjunctivitis ([facility]) ED, Joint Pain





Add. Discharge Instructions:  


Continue over the counter biofreeze and Ibuprofen.  Follow packaging directions.





Use the muscle relaxers for the next 3 days as needed. 1-2 pills every 8 hours 

as needed.  





Erythromycin eye ointment to the left eye 4 times a day for 1 week for red, 

irritated eye.





Return to the Emergency Department for ant new, concerning or emergent 

complaints.


Scripts


Erythromycin Base (Erythromycin Opthalmic Ointment) 5 Mg/Gram (0.5 %) Oint...g.


0 OP Q6H, #1 EA


   1/2 inch ribbon to left eye Q6h for 1 week


   Prov: JESSICA MCGREGOR MD         12/20/22 


Methocarbamol (Methocarbamol) 750 Mg Tablet


1500 MG PO Q8H for Back Pain, #18 TAB


   Prov: JESSICA MCGREGOR MD         12/20/22


Work/School Note:  Work Release Form   Date Seen in the Emergency Department:  

Dec 20, 2022


   Return to Work:  Dec 21, 2022





Copy


Copies To 1:   YEE PATEL KATHRYN M MD         Dec 20, 2022 09:09

## 2023-01-14 ENCOUNTER — HOSPITAL ENCOUNTER (EMERGENCY)
Dept: HOSPITAL 75 - ER | Age: 43
Discharge: PSYCHIATRIC OR SUBSTANCE USE FACILITY | End: 2023-01-14
Payer: COMMERCIAL

## 2023-01-14 VITALS — HEIGHT: 69.29 IN | BODY MASS INDEX: 32.61 KG/M2 | WEIGHT: 222.67 LBS

## 2023-01-14 VITALS — DIASTOLIC BLOOD PRESSURE: 74 MMHG | SYSTOLIC BLOOD PRESSURE: 121 MMHG

## 2023-01-14 DIAGNOSIS — F17.210: ICD-10-CM

## 2023-01-14 DIAGNOSIS — Z20.822: ICD-10-CM

## 2023-01-14 DIAGNOSIS — R44.1: Primary | ICD-10-CM

## 2023-01-14 DIAGNOSIS — R45.851: ICD-10-CM

## 2023-01-14 LAB
ALBUMIN SERPL-MCNC: 4.4 GM/DL (ref 3.2–4.5)
ALP SERPL-CCNC: 98 U/L (ref 40–136)
ALT SERPL-CCNC: 18 U/L (ref 0–55)
APAP SERPL-MCNC: < 10 UG/ML (ref 10–30)
APTT PPP: YELLOW S
BACTERIA #/AREA URNS HPF: NEGATIVE /HPF
BARBITURATES UR QL: NEGATIVE
BASOPHILS # BLD AUTO: 0.1 10^3/UL (ref 0–0.1)
BASOPHILS NFR BLD AUTO: 1 % (ref 0–10)
BENZODIAZ UR QL SCN: NEGATIVE
BILIRUB SERPL-MCNC: 0.5 MG/DL (ref 0.1–1)
BILIRUB UR QL STRIP: NEGATIVE
BUN/CREAT SERPL: 5
CALCIUM SERPL-MCNC: 9.2 MG/DL (ref 8.5–10.1)
CHLORIDE SERPL-SCNC: 106 MMOL/L (ref 98–107)
CO2 SERPL-SCNC: 24 MMOL/L (ref 21–32)
COCAINE UR QL: NEGATIVE
CREAT SERPL-MCNC: 0.97 MG/DL (ref 0.6–1.3)
EOSINOPHIL # BLD AUTO: 0.3 10^3/UL (ref 0–0.3)
EOSINOPHIL NFR BLD AUTO: 6 % (ref 0–10)
FIBRINOGEN PPP-MCNC: CLEAR MG/DL
GFR SERPLBLD BASED ON 1.73 SQ M-ARVRAT: 99 ML/MIN
GLUCOSE SERPL-MCNC: 102 MG/DL (ref 70–105)
GLUCOSE UR STRIP-MCNC: NEGATIVE MG/DL
HCT VFR BLD CALC: 52 % (ref 40–54)
HGB BLD-MCNC: 17.5 G/DL (ref 13.3–17.7)
KETONES UR QL STRIP: NEGATIVE
LEUKOCYTE ESTERASE UR QL STRIP: NEGATIVE
LYMPHOCYTES # BLD AUTO: 1.4 10^3/UL (ref 1–4)
LYMPHOCYTES NFR BLD AUTO: 27 % (ref 12–44)
MANUAL DIFFERENTIAL PERFORMED BLD QL: NO
MCH RBC QN AUTO: 33 PG (ref 25–34)
MCHC RBC AUTO-ENTMCNC: 34 G/DL (ref 32–36)
MCV RBC AUTO: 98 FL (ref 80–99)
METHADONE UR QL SCN: NEGATIVE
MONOCYTES # BLD AUTO: 0.5 10^3/UL (ref 0–1)
MONOCYTES NFR BLD AUTO: 10 % (ref 0–12)
NEUTROPHILS # BLD AUTO: 2.9 10^3/UL (ref 1.8–7.8)
NEUTROPHILS NFR BLD AUTO: 55 % (ref 42–75)
NITRITE UR QL STRIP: NEGATIVE
OPIATES UR QL SCN: NEGATIVE
OXYCODONE UR QL: NEGATIVE
PH UR STRIP: 7.5 [PH] (ref 5–9)
PLATELET # BLD: 274 10^3/UL (ref 130–400)
PMV BLD AUTO: 8.3 FL (ref 9–12.2)
POTASSIUM SERPL-SCNC: 4.2 MMOL/L (ref 3.6–5)
PROPOXYPH UR QL: NEGATIVE
PROT SERPL-MCNC: 7.4 GM/DL (ref 6.4–8.2)
PROT UR QL STRIP: NEGATIVE
RBC #/AREA URNS HPF: (no result) /HPF
SALICYLATES SERPL-MCNC: < 5 MG/DL (ref 5–20)
SODIUM SERPL-SCNC: 140 MMOL/L (ref 135–145)
SP GR UR STRIP: 1.01 (ref 1.02–1.02)
SQUAMOUS #/AREA URNS HPF: (no result) /HPF
TRICYCLICS UR QL SCN: NEGATIVE
WBC # BLD AUTO: 5.2 10^3/UL (ref 4.3–11)
WBC #/AREA URNS HPF: (no result) /HPF

## 2023-01-14 PROCEDURE — 99285 EMERGENCY DEPT VISIT HI MDM: CPT

## 2023-01-14 PROCEDURE — 87636 SARSCOV2 & INF A&B AMP PRB: CPT

## 2023-01-14 PROCEDURE — 85025 COMPLETE CBC W/AUTO DIFF WBC: CPT

## 2023-01-14 PROCEDURE — 36415 COLL VENOUS BLD VENIPUNCTURE: CPT

## 2023-01-14 PROCEDURE — 80329 ANALGESICS NON-OPIOID 1 OR 2: CPT

## 2023-01-14 PROCEDURE — 93005 ELECTROCARDIOGRAM TRACING: CPT

## 2023-01-14 PROCEDURE — 81000 URINALYSIS NONAUTO W/SCOPE: CPT

## 2023-01-14 PROCEDURE — 80306 DRUG TEST PRSMV INSTRMNT: CPT

## 2023-01-14 PROCEDURE — 80053 COMPREHEN METABOLIC PANEL: CPT

## 2023-01-14 PROCEDURE — 80320 DRUG SCREEN QUANTALCOHOLS: CPT
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
